# Patient Record
Sex: MALE | Race: BLACK OR AFRICAN AMERICAN | NOT HISPANIC OR LATINO | Employment: OTHER | URBAN - METROPOLITAN AREA
[De-identification: names, ages, dates, MRNs, and addresses within clinical notes are randomized per-mention and may not be internally consistent; named-entity substitution may affect disease eponyms.]

---

## 2017-01-01 ENCOUNTER — HOSPITAL ENCOUNTER (EMERGENCY)
Facility: OTHER | Age: 72
Discharge: HOME | End: 2017-01-01
Attending: EMERGENCY MEDICINE
Payer: MEDICARE

## 2017-01-01 VITALS
HEART RATE: 76 BPM | TEMPERATURE: 98 F | HEIGHT: 70 IN | RESPIRATION RATE: 18 BRPM | DIASTOLIC BLOOD PRESSURE: 89 MMHG | BODY MASS INDEX: 18.61 KG/M2 | SYSTOLIC BLOOD PRESSURE: 146 MMHG | WEIGHT: 130 LBS | OXYGEN SATURATION: 99 %

## 2017-01-01 DIAGNOSIS — K13.0 ABSCESS OF LIP: Primary | ICD-10-CM

## 2017-01-01 PROCEDURE — 99283 EMERGENCY DEPT VISIT LOW MDM: CPT

## 2017-01-01 PROCEDURE — 25000003 PHARM REV CODE 250: Performed by: EMERGENCY MEDICINE

## 2017-01-01 RX ORDER — CLINDAMYCIN HYDROCHLORIDE 150 MG/1
300 CAPSULE ORAL
Status: COMPLETED | OUTPATIENT
Start: 2017-01-01 | End: 2017-01-01

## 2017-01-01 RX ORDER — CLINDAMYCIN HYDROCHLORIDE 300 MG/1
300 CAPSULE ORAL 3 TIMES DAILY
Qty: 30 CAPSULE | Refills: 0 | Status: SHIPPED | OUTPATIENT
Start: 2017-01-01 | End: 2017-01-11

## 2017-01-01 RX ORDER — TAMSULOSIN HYDROCHLORIDE 0.4 MG/1
0.4 CAPSULE ORAL DAILY
COMMUNITY

## 2017-01-01 RX ORDER — TRAMADOL HYDROCHLORIDE 50 MG/1
50 TABLET ORAL EVERY 6 HOURS PRN
Qty: 12 TABLET | Refills: 0 | Status: SHIPPED | OUTPATIENT
Start: 2017-01-01 | End: 2017-01-11

## 2017-01-01 RX ADMIN — CLINDAMYCIN HYDROCHLORIDE 300 MG: 150 CAPSULE ORAL at 06:01

## 2017-01-01 NOTE — ED AVS SNAPSHOT
[facility]  [address]  [facility] [address]               Ayaan Ricks   2017  5:48 AM   ED    Description:  Male : 1944   Department:  [facility]           Your Care was Coordinated By:     Provider Role From To    Humphrey Alex MD Attending Provider 17 0552 --      Reason for Visit     Dental Pain           Diagnoses this Visit        Comments    Abscess of lip    -  Primary       ED Disposition     None           To Do List           Follow-up Information     Follow up with [facility] In 2 days.    Contact information:    [address]  [place] [address]         These Medications        Disp Refills Start End    clindamycin (CLEOCIN) 300 MG capsule 30 capsule 0 2017    Take 1 capsule (300 mg total) by mouth 3 (three) times daily. - Oral    tramadol (ULTRAM) 50 mg tablet 12 tablet 0 2017    Take 1 tablet (50 mg total) by mouth every 6 (six) hours as needed for Pain. - Oral      [facility]     [facility] -  Assistance  Registered nurses in the [facility] provide clinical advisement, health education, appointment booking, and other advisory services.  Call for this free service at [phone].             Medications           Message regarding Medications     Verify the changes and/or additions to your medication regime listed below are the same as discussed with your clinician today.  If any of these changes or additions are incorrect, please notify your healthcare provider.        START taking these NEW medications        Refills    clindamycin (CLEOCIN) 300 MG capsule 0    Sig: Take 1 capsule (300 mg total) by mouth 3 (three) times daily.    Class: Print    Route: Oral    tramadol (ULTRAM) 50 mg tablet 0    Sig: Take 1 tablet (50 mg total) by mouth every 6 (six) hours as needed for Pain.    Class: Print     "Route: Oral      These medications were administered today        Dose Freq    clindamycin capsule 300 mg 300 mg ED 1 Time    Sig: Take 2 capsules (300 mg total) by mouth ED 1 Time.    Class: Normal    Route: Oral           Verify that the below list of medications is an accurate representation of the medications you are currently taking.  If none reported, the list may be blank. If incorrect, please contact your healthcare provider. Carry this list with you in case of emergency.           Current Medications     tamsulosin (FLOMAX) 0.4 mg Cp24 Take 0.4 mg by mouth once daily.    clindamycin (CLEOCIN) 300 MG capsule Take 1 capsule (300 mg total) by mouth 3 (three) times daily.    clindamycin capsule 300 mg Take 2 capsules (300 mg total) by mouth ED 1 Time.    tramadol (ULTRAM) 50 mg tablet Take 1 tablet (50 mg total) by mouth every 6 (six) hours as needed for Pain.           Clinical Reference Information           Your Vitals Were     BP Pulse Temp Resp Height Weight    146/89 (BP Location: Left arm, Patient Position: Sitting) 76 98.2 °F (36.8 °C) (Oral) 18 5' 10" (1.778 m) 59 kg (130 lb)    SpO2 BMI             99% 18.65 kg/m2         Allergies as of 1/1/2017        Reactions    Shellfish Containing Products       Immunizations Administered on Date of Encounter - 1/1/2017     None      ED Micro, Lab, POCT     None      ED Imaging Orders     None      Discharge References/Attachments     ABSCESS, ANTIBIOTIC TREATMENT ONLY (ENGLISH)      [facility] Sign-Up     Activating your [facility] account is as easy as 1-2-3!     1) Visit [email or web address], select Sign Up Now, enter this activation code and your date of birth, then select Next.  [email or web address]  Expires: 2/15/2017  6:05 AM      2) Create a username and password to use when you visit [facility] in the future and select a security question in case you lose your password and select Next.    3) Enter your e-mail address and click Sign Up!    Additional Information  If " you have questions, please e-mail [email or web address] or call [phone] to talk to our [facility] staff. Remember, [facility] is NOT to be used for urgent needs. For medical emergencies, dial 911.         Smoking Cessation     If you would like to quit smoking:   You may be eligible for free services if you are a [place] resident and started smoking cigarettes before September 1, 1988.  Call the Smoking Cessation Trust (SCT) toll free at (951) 774-5090 or (991) 528-8118.   Call 4-100-QUIT-NOW if you do not meet the above criteria.             [facility] complies with applicable Federal civil rights laws and does not discriminate on the basis of race, color, national origin, age, disability, or sex.        Language Assistance Services     ATTENTION: Language assistance services are available, free of charge. Please call [phone].      ATENCIÓN: Si habla español, tiene a vincent disposición servicios gratuitos de asistencia lingüística. Llame al [phone].     CHÚ Ý: N?u b?n nói Ti?ng Vi?t, có các d?ch v? h? tr? ngôn ng? mi?n phí dành cho b?n. G?i s? [phone].

## 2017-01-01 NOTE — ED NOTES
Pt presents to the ED with c/o swelling to the top lip. Pt has hardened mass to the inner upper lip, no drainage. Pt reports it started about 6 days ago. Pt c/o 8/10 pain to the lip. Pt appears non toxic denies fever/chills.

## 2017-01-01 NOTE — ED PROVIDER NOTES
Encounter Date: 1/1/2017    SCRIBE #1 NOTE: I, Patricia Maria, am scribing for, and in the presence of,  Dr. Butt. I have scribed the entire note.       History     Chief Complaint   Patient presents with    Dental Pain     pt reports dental pain and swelling since elaine on the upper front teeth.      Review of patient's allergies indicates:   Allergen Reactions    Shellfish containing products      HPI Comments: Time seen by provider: 5:57 AM    This is a 72 y.o. male with HTN who presents with complaint of acute left lip swelling that began 6 days ago. He notes associated left sided lip pain. He reports history of abscess in same location. He states he recently got his upper left incisor pulled.     The history is provided by the patient.     Past Medical History   Diagnosis Date    Blind one eye      left    Hypertension     Prostate disorder      No past medical history pertinent negatives.  Past Surgical History   Procedure Laterality Date    Hernia repair      Colon surgery       History reviewed. No pertinent family history.  Social History   Substance Use Topics    Smoking status: Current Every Day Smoker    Smokeless tobacco: None    Alcohol use Yes      Comment: occasional     Review of Systems   Constitutional: Negative for diaphoresis and fever.   HENT: Negative for congestion and sore throat.         Left upper lip swelling and pain.   Eyes: Negative for pain.   Respiratory: Negative for cough and shortness of breath.    Cardiovascular: Negative for chest pain and leg swelling.   Gastrointestinal: Negative for nausea and vomiting.   Genitourinary: Negative for dysuria and hematuria.   Musculoskeletal: Negative for back pain and myalgias.   Skin: Negative for color change and rash.   Neurological: Negative for syncope and headaches.   Psychiatric/Behavioral: Negative for behavioral problems and confusion.       Physical Exam   Initial Vitals   BP Pulse Resp Temp SpO2   01/01/17 0547  01/01/17 0547 01/01/17 0547 01/01/17 0547 01/01/17 0547   146/89 76 18 98.2 °F (36.8 °C) 99 %     Physical Exam    Nursing note and vitals reviewed.  Constitutional: He appears well-developed and well-nourished. He is not diaphoretic. No distress.   HENT:   Head: Normocephalic and atraumatic.   Left upper incisor is missing, there is no tooth or gum tenderness. No swelling. Patient has a hard indurated abscess to the left upper lip.   No airway obstruction. No drooling. No trismus. No stridor.  Oropharynx is clear and intact.  Moist mucous membranes.   Eyes: Right eye exhibits no discharge. Left eye exhibits no discharge.   Conjunctiva are pink, clear, and intact.   Neck: Normal range of motion. Neck supple. No JVD present.   Cardiovascular: Normal rate, regular rhythm, S1 normal, S2 normal and normal heart sounds. Exam reveals no gallop and no friction rub.    No murmur heard.  Normal S1, S2. No murmurs, no rubs, no gallops.    Pulmonary/Chest: Breath sounds normal. No respiratory distress. He has no wheezes. He has no rhonchi. He has no rales.   Clear to ascultation bilaterally.   Abdominal: Soft. Bowel sounds are normal. He exhibits no distension. There is no tenderness. There is no rebound and no guarding.   No audible bruits.   Musculoskeletal: Normal range of motion. He exhibits no edema or tenderness.   No lower extremity edema.    Neurological: He is alert and oriented to person, place, and time. He has normal strength. No sensory deficit.   Skin: Skin is warm and dry. No rash and no abscess noted. No erythema. No pallor.   Psychiatric: He has a normal mood and affect. His behavior is normal. Judgment and thought content normal.         ED Course   Procedures  Labs Reviewed - No data to display   Imaging Results     None                             Scribe Attestation:   Scribe #1: I performed the above scribed service and the documentation accurately describes the services I performed. I attest to the  accuracy of the note.    Attending Attestation:           Physician Attestation for Scribe:  Physician Attestation Statement for Scribe #1: I, Dr. Alex, reviewed documentation, as scribed by Patricia Maria in my presence, and it is both accurate and complete.         Attending ED Notes:   Urgent evaluation a 72-year-old male with complaint of lesion to left upper lip.  Lesion is consistent with abscess formation.  No fluctuance to incise and drain.  Patient is afebrile, nontoxic-appearing with stable vital signs.  Patient is extensively counseled on his diagnosis and treatment, discharged in good condition and directed to follow-up with his PCP in the next 24-48 hours.          ED Course     Clinical Impression:     1. Abscess of lip              Humphrey Alex MD  01/01/17 0816

## 2017-01-01 NOTE — ED NOTES
Pt given d/c instructions to include medications and follow up care and verbalized understanding. All questions answered at the time of d/c. Pt ambulated to d/c  window in stable condition.

## 2017-01-05 ENCOUNTER — HOSPITAL ENCOUNTER (EMERGENCY)
Facility: OTHER | Age: 72
Discharge: HOME | End: 2017-01-05
Attending: EMERGENCY MEDICINE
Payer: MEDICARE

## 2017-01-05 VITALS
SYSTOLIC BLOOD PRESSURE: 169 MMHG | RESPIRATION RATE: 15 BRPM | WEIGHT: 120 LBS | OXYGEN SATURATION: 100 % | HEIGHT: 70 IN | TEMPERATURE: 97 F | DIASTOLIC BLOOD PRESSURE: 91 MMHG | BODY MASS INDEX: 17.18 KG/M2 | HEART RATE: 76 BPM

## 2017-01-05 DIAGNOSIS — K13.0 LIP ABSCESS: Primary | ICD-10-CM

## 2017-01-05 PROCEDURE — 40800 DRAINAGE OF MOUTH LESION: CPT

## 2017-01-05 PROCEDURE — 25000003 PHARM REV CODE 250: Performed by: PHYSICIAN ASSISTANT

## 2017-01-05 PROCEDURE — 99283 EMERGENCY DEPT VISIT LOW MDM: CPT | Mod: 25

## 2017-01-05 PROCEDURE — 10060 I&D ABSCESS SIMPLE/SINGLE: CPT

## 2017-01-05 RX ORDER — LIDOCAINE HYDROCHLORIDE AND EPINEPHRINE 10; 10 MG/ML; UG/ML
10 INJECTION, SOLUTION INFILTRATION; PERINEURAL
Status: COMPLETED | OUTPATIENT
Start: 2017-01-05 | End: 2017-01-05

## 2017-01-05 RX ORDER — IBUPROFEN 600 MG/1
600 TABLET ORAL EVERY 6 HOURS PRN
Qty: 20 TABLET | Refills: 0 | Status: SHIPPED | OUTPATIENT
Start: 2017-01-05 | End: 2017-02-04

## 2017-01-05 RX ORDER — LIDOCAINE AND PRILOCAINE 25; 25 MG/G; MG/G
CREAM TOPICAL
Status: DISCONTINUED | OUTPATIENT
Start: 2017-01-05 | End: 2017-01-05

## 2017-01-05 RX ADMIN — LIDOCAINE HYDROCHLORIDE,EPINEPHRINE BITARTRATE 10 ML: 10; .01 INJECTION, SOLUTION INFILTRATION; PERINEURAL at 10:01

## 2017-01-05 NOTE — ED TRIAGE NOTES
Pt presents to ED with c/o upper lip swelling. Pt states he was seen here in ED on 1/1/2017, diagnosed with abscess and started on Clindamycin. Pt reports he has been taking antibiotics, but states swelling has increased. Denies difficulty swallowing, denies difficulty breathing. Pt AAO x3.

## 2017-01-05 NOTE — ED AVS SNAPSHOT
[facility]  [address]  [facility] [address]               Ayaan Ricks   2017  9:37 AM   ED    Description:  Male : 1944   Department:  [facility]           Your Care was Coordinated By:     Provider Role From To    Concha Gupta MD Attending Provider 1746 --    Nuvia Pandey PA-C Physician Assistant 17 --      Reason for Visit     Oral Swelling           Diagnoses this Visit        Comments    Lip abscess    -  Primary       ED Disposition     None           To Do List           Follow-up Information     Follow up with [facility] In 2 days.    Specialty:  Emergency Medicine    Why:  For symptom re-check.     Contact information:    [address]  [facility] [address] [phone]       These Medications        Disp Refills Start End    ibuprofen (ADVIL,MOTRIN) 600 MG tablet 20 tablet 0 2017     Take 1 tablet (600 mg total) by mouth every 6 (six) hours as needed for Pain. - Oral      [facility]     [facility] Care [facility] -  Assistance  Registered nurses in the [facility] provide clinical advisement, health education, appointment booking, and other advisory services.  Call for this free service at [phone].             Medications           Message regarding Medications     Verify the changes and/or additions to your medication regime listed below are the same as discussed with your clinician today.  If any of these changes or additions are incorrect, please notify your healthcare provider.        START taking these NEW medications        Refills    ibuprofen (ADVIL,MOTRIN) 600 MG tablet 0    Sig: Take 1 tablet (600 mg total) by mouth every 6 (six) hours as needed for Pain.    Class: Print    Route: Oral      These medications were administered today        Dose Freq    lidocaine-EPINEPHrine 1%-1:100,000 injection 10 mL 10 mL ED  "1 Time    Sig: Inject 10 mLs into the skin ED 1 Time.    Class: Normal    Route: Subcutaneous    Cosign for Ordering: Required by Concha Gupta MD           Verify that the below list of medications is an accurate representation of the medications you are currently taking.  If none reported, the list may be blank. If incorrect, please contact your healthcare provider. Carry this list with you in case of emergency.           Current Medications     clindamycin (CLEOCIN) 300 MG capsule Take 1 capsule (300 mg total) by mouth 3 (three) times daily.    tamsulosin (FLOMAX) 0.4 mg Cp24 Take 0.4 mg by mouth once daily.    tramadol (ULTRAM) 50 mg tablet Take 1 tablet (50 mg total) by mouth every 6 (six) hours as needed for Pain.    ibuprofen (ADVIL,MOTRIN) 600 MG tablet Take 1 tablet (600 mg total) by mouth every 6 (six) hours as needed for Pain.           Clinical Reference Information           Your Vitals Were     BP Pulse Temp Resp Height Weight    169/91 (BP Location: Left arm, Patient Position: Sitting) 76 97.4 °F (36.3 °C) (Oral) 15 5' 10" (1.778 m) 54.4 kg (120 lb)    SpO2 BMI             100% 17.22 kg/m2         Allergies as of 1/5/2017        Reactions    Shellfish Containing Products     Unsure if he is allergic to Shellfish or not.      Immunizations Administered on Date of Encounter - 1/5/2017     None      ED Micro, Lab, POCT     None      ED Imaging Orders     None      Discharge References/Attachments     ABSCESS, INCISION AND DRAINAGE (ENGLISH)      [facility] Sign-Up     Activating your [facility] account is as easy as 1-2-3!     1) Visit [email or web address], select Sign Up Now, enter this activation code and your date of birth, then select Next.  [email or web address]  Expires: 2/15/2017  6:05 AM      2) Create a username and password to use when you visit [facility] in the future and select a security question in case you lose your password and select Next.    3) Enter your e-mail address and click Sign " Up!    Additional Information  If you have questions, please e-mail [email or web address] or call [phone] to talk to our [facility] staff. Remember, [facility] is NOT to be used for urgent needs. For medical emergencies, dial 911.         Smoking Cessation     If you would like to quit smoking:   You may be eligible for free services if you are a [place] resident and started smoking cigarettes before September 1, 1988.  Call the Smoking Cessation Trust (SCT) toll free at (159) 859-3628 or (224) 556-2802.   Call 3-120-QUIT-NOW if you do not meet the above criteria.             [facility] complies with applicable Federal civil rights laws and does not discriminate on the basis of race, color, national origin, age, disability, or sex.        Language Assistance Services     ATTENTION: Language assistance services are available, free of charge. Please call [phone].      ATENCIÓN: Si habla español, tiene a vincent disposición servicios gratuitos de asistencia lingüística. Llame al [phone].     CHÚ Ý: N?u b?n nói Ti?ng Vi?t, có các d?ch v? h? tr? ngôn ng? mi?n phí jsh cho b?n. G?i s? [phone].

## 2017-01-05 NOTE — ED PROVIDER NOTES
Encounter Date: 1/5/2017       History     Chief Complaint   Patient presents with    Oral Swelling     upper lip swelling gradually worsening since Marsha. reports being seen on New Years with no improvement in swelling. denies dental pain or fever/chills     Review of patient's allergies indicates:   Allergen Reactions    Shellfish containing products      Unsure if he is allergic to Shellfish or not.     HPI Comments: Patient is 72-year-old male history of hypertension and prostate disorder who presents with complaints of lip swelling that is been present for approximately 6 days prior to arrival.  He reports previously being seen in this ER 4 days ago and was diagnosed with a lip abscess.  He was treated with clindamycin but reports area of swelling and pain is only increasing in size.  He has reported subjective fevers but denies measured temperatures.  He has no report of chest pain, shortness of breath, dizziness, altered mental status, nausea, vomiting.  Currently unaccompanied in the ER.     The history is provided by the patient.     Past Medical History   Diagnosis Date    Blind one eye      left    Hypertension     Prostate disorder      No past medical history pertinent negatives.  Past Surgical History   Procedure Laterality Date    Hernia repair      Colon surgery       History reviewed. No pertinent family history.  Social History   Substance Use Topics    Smoking status: Current Every Day Smoker     Types: Cigarettes    Smokeless tobacco: None    Alcohol use Yes      Comment: occasional     Review of Systems   Constitutional: Negative for chills and fever.   HENT: Negative for sore throat and trouble swallowing.    Eyes: Negative for visual disturbance.   Respiratory: Negative for cough and shortness of breath.    Cardiovascular: Negative for chest pain.   Gastrointestinal: Negative for abdominal pain, constipation, diarrhea, nausea and vomiting.   Genitourinary: Negative for dysuria  and flank pain.   Musculoskeletal: Negative for back pain, neck pain and neck stiffness.   Skin: Negative for rash.        Lip abscess   Neurological: Negative for dizziness, syncope, weakness and headaches.   Psychiatric/Behavioral: Negative for confusion.       Physical Exam   Initial Vitals   BP Pulse Resp Temp SpO2   01/05/17 0934 01/05/17 0934 01/05/17 0934 01/05/17 0934 01/05/17 0934   169/91 76 15 97.4 °F (36.3 °C) 100 %     Physical Exam    Nursing note and vitals reviewed.  Constitutional: He appears well-developed and well-nourished. He is not diaphoretic. No distress.   Elderly -American male who appears younger than stated age in no acute distress but does appear uncomfortable during interview and exam.  He is able to speak in clear full sentences, makes good eye contact and is cooperative.  His oral secretions with ease.   HENT:   Head: Normocephalic and atraumatic.       There is no lingual elevation, uvular deviation, trismus, facial cellulitis   Eyes: EOM are normal. Pupils are equal, round, and reactive to light.   Neck: Normal range of motion. Neck supple.   Cardiovascular: Normal rate, regular rhythm and normal heart sounds. Exam reveals no gallop and no friction rub.    No murmur heard.  Pulmonary/Chest: Breath sounds normal. He has no wheezes. He has no rhonchi. He has no rales.   Abdominal: Soft. Bowel sounds are normal. There is no tenderness. There is no rebound and no guarding.   Musculoskeletal: Normal range of motion. He exhibits no edema or tenderness.   Lymphadenopathy:     He has no cervical adenopathy.   Neurological: He is alert and oriented to person, place, and time. He has normal strength.   Skin: Skin is warm and dry. No rash and no abscess noted. No erythema.   Psychiatric: He has a normal mood and affect. His behavior is normal. Thought content normal.         ED Course   I & D - Incision and Drainage  Date/Time: 1/5/2017 10:34 AM  Performed by: ORLANDO BAH  SHERYL  Authorized by: LUIZA COREAS   Consent Done: Not Needed  Type: abscess  Body area: mouth (upper lip)  Anesthesia: local infiltration    Anesthesia:  Anesthesia: local infiltration  Local Anesthetic: lidocaine 1% with epinephrine   Patient sedated: no  Scalpel size: 11  Incision type: single straight  Complexity: simple  Drainage: pus,  bloody and  purulent  Drainage amount: moderate  Wound treatment: incision,  drainage and  wound left open  Complications: No  Specimens: No  Implants: No  Patient tolerance: Patient tolerated the procedure well with no immediate complications        Labs Reviewed - No data to display          Medical Decision Making:   ED Management:  Urgent evaluation of 72-year-old male who presents with complaints of upper lip abscess without evidence of facial cellulitis.  Patient is afebrile, nontoxic appearing, hemodynamically stable.  Physical exam outlined above and reveals significant abscess with swelling to the left upper lip.  There is centralized fluctuance felt on the mucous membrane on the inside of the lip.  I did I&D this abscess with good result and significant pain relief reported by the patient.  No complications.  I will encourage patient to continue taking the previously prescribed clindamycin until completed.  I gave him ibuprofen to take for pain.  He is encouraged to apply ice to decrease swelling immediately after procedure and then once home to apply heat.  She is amenable to this plan.  He is made aware of signs and symptoms of worsening and is told if these present he should return to the ER.  Case discussed with attending who agrees with plan.                   ED Course     Clinical Impression:   The encounter diagnosis was Lip abscess.          Nuvia Pandey PA-C  01/05/17 8028

## 2017-02-04 ENCOUNTER — HOSPITAL ENCOUNTER (EMERGENCY)
Facility: OTHER | Age: 72
Discharge: HOME | End: 2017-02-04
Attending: EMERGENCY MEDICINE
Payer: MEDICARE

## 2017-02-04 VITALS
DIASTOLIC BLOOD PRESSURE: 80 MMHG | WEIGHT: 130 LBS | HEIGHT: 70 IN | HEART RATE: 70 BPM | BODY MASS INDEX: 18.61 KG/M2 | SYSTOLIC BLOOD PRESSURE: 132 MMHG | RESPIRATION RATE: 18 BRPM | OXYGEN SATURATION: 99 % | TEMPERATURE: 99 F

## 2017-02-04 DIAGNOSIS — K13.0 ABSCESS OF LIP: Primary | ICD-10-CM

## 2017-02-04 PROCEDURE — 25000003 PHARM REV CODE 250: Performed by: EMERGENCY MEDICINE

## 2017-02-04 PROCEDURE — 99283 EMERGENCY DEPT VISIT LOW MDM: CPT

## 2017-02-04 RX ORDER — TRAMADOL HYDROCHLORIDE 50 MG/1
50 TABLET ORAL EVERY 6 HOURS PRN
Qty: 12 TABLET | Refills: 0 | Status: SHIPPED | OUTPATIENT
Start: 2017-02-04 | End: 2017-02-14

## 2017-02-04 RX ORDER — CLINDAMYCIN HYDROCHLORIDE 150 MG/1
300 CAPSULE ORAL
Status: COMPLETED | OUTPATIENT
Start: 2017-02-04 | End: 2017-02-04

## 2017-02-04 RX ORDER — CLINDAMYCIN HYDROCHLORIDE 300 MG/1
300 CAPSULE ORAL 3 TIMES DAILY
Qty: 30 CAPSULE | Refills: 0 | Status: SHIPPED | OUTPATIENT
Start: 2017-02-04 | End: 2017-02-14

## 2017-02-04 RX ADMIN — CLINDAMYCIN HYDROCHLORIDE 300 MG: 150 CAPSULE ORAL at 08:02

## 2017-02-04 NOTE — ED TRIAGE NOTES
Pt reports an abscess to top upper lip for the past month; pt had same issue a few weeks ago. [place] reported upon touch.

## 2017-02-04 NOTE — ED AVS SNAPSHOT
[facility]  [address]  [facility] [address]               Ayaan Ricks   2017  6:51 AM   ED    Description:  Male : 1944   Department:  [facility]           Your Care was Coordinated By:     Provider Role From To    Humphrey Alex MD Attending Provider 17 0701 --      Reason for Visit     Abscess           Diagnoses this Visit        Comments    Abscess of lip    -  Primary       ED Disposition     None           To Do List           Follow-up Information     Follow up with Aly Rice MD In 2 days.    Specialty:  Otolaryngology    Contact information:    [address] [facility] [facility] [address]  [phone]          Follow up with [address] - Otorhinolaryngology In 2 days.    Specialty:  Otolaryngology    Contact information:    [address]  [facility] [address] [phone]    Additional information:    [facility] [place] - 4th Floor       These Medications        Disp Refills Start End    clindamycin (CLEOCIN) 300 MG capsule 30 capsule 0 2017    Take 1 capsule (300 mg total) by mouth 3 (three) times daily. - Oral    tramadol (ULTRAM) 50 mg tablet 12 tablet 0 2017    Take 1 tablet (50 mg total) by mouth every 6 (six) hours as needed for Pain. - Oral      [facility]     [facility] On Call Nurse Care Line -  Assistance  Registered nurses in the [facility] On Call Center provide clinical advisement, health education, appointment booking, and other advisory services.  Call for this free service at [phone].             Medications           Message regarding Medications     Verify the changes and/or additions to your medication regime listed below are the same as discussed with your clinician today.  If any of these changes or additions are incorrect, please notify your healthcare provider.        START taking these NEW medications        Refills     "clindamycin (CLEOCIN) 300 MG capsule 0    Sig: Take 1 capsule (300 mg total) by mouth 3 (three) times daily.    Class: Print    Route: Oral    tramadol (ULTRAM) 50 mg tablet 0    Sig: Take 1 tablet (50 mg total) by mouth every 6 (six) hours as needed for Pain.    Class: Print    Route: Oral      These medications were administered today        Dose Freq    clindamycin capsule 300 mg 300 mg ED 1 Time    Sig: Take 2 capsules (300 mg total) by mouth ED 1 Time.    Class: Normal    Route: Oral      STOP taking these medications     ibuprofen (ADVIL,MOTRIN) 600 MG tablet Take 1 tablet (600 mg total) by mouth every 6 (six) hours as needed for Pain.           Verify that the below list of medications is an accurate representation of the medications you are currently taking.  If none reported, the list may be blank. If incorrect, please contact your healthcare provider. Carry this list with you in case of emergency.           Current Medications     tamsulosin (FLOMAX) 0.4 mg Cp24 Take 0.4 mg by mouth once daily.    clindamycin (CLEOCIN) 300 MG capsule Take 1 capsule (300 mg total) by mouth 3 (three) times daily.    clindamycin capsule 300 mg Take 2 capsules (300 mg total) by mouth ED 1 Time.    tramadol (ULTRAM) 50 mg tablet Take 1 tablet (50 mg total) by mouth every 6 (six) hours as needed for Pain.           Clinical Reference Information           Your Vitals Were     BP Pulse Temp Resp Height Weight    133/82 (BP Location: Left arm, Patient Position: Sitting) 71 98.7 °F (37.1 °C) (Oral) 16 5' 10" (1.778 m) 59 kg (130 lb)    SpO2 BMI             99% 18.65 kg/m2         Allergies as of 2/4/2017        Reactions    Shellfish Containing Products     Unsure if he is allergic to Shellfish or not.      Immunizations Administered on Date of Encounter - 2/4/2017     None      ED Micro, Lab, POCT     None      ED Imaging Orders     None      Discharge References/Attachments     ABSCESS, ANTIBIOTIC TREATMENT ONLY (ENGLISH)    "   [facility] Sign-Up     Activating your [facility] account is as easy as 1-2-3!     1) Visit [email or web address], select Sign Up Now, enter this activation code and your date of birth, then select Next.  [email or web address]  Expires: 2/15/2017  6:05 AM      2) Create a username and password to use when you visit [facility] in the future and select a security question in case you lose your password and select Next.    3) Enter your e-mail address and click Sign Up!    Additional Information  If you have questions, please e-mail [email or web address] or call [phone] to talk to our [facility] staff. Remember, [facility] is NOT to be used for urgent needs. For medical emergencies, dial 911.         Smoking Cessation     If you would like to quit smoking:   You may be eligible for free services if you are a [place] resident and started smoking cigarettes before September 1, 1988.  Call the Smoking Cessation [employer] ([facility]) toll free at [phone] or [phone]) 584-2569.   Call 5-813-QUIT-NOW if you do not meet the above criteria.             [facility] complies with applicable Federal civil rights laws and does not discriminate on the basis of race, color, national origin, age, disability, or sex.        Language Assistance Services     ATTENTION: Language assistance services are available, free of charge. Please call [phone].      ATENCIÓN: Si habla español, tiene a vincent disposición servicios gratuitos de asistencia lingüística. Llame al [phone].     CHÚ Ý: N?u b?n nói Ti?ng Vi?t, có các d?ch v? h? tr? ngôn ng? mi?n phí dành cho b?n. G?i s? [phone].

## 2017-02-04 NOTE — ED PROVIDER NOTES
Encounter Date: 2/4/2017    SCRIBE #1 NOTE: I, Jaclyn Cool, am scribing for, and in the presence of, Dr. Alex.       History     Chief Complaint   Patient presents with    Abscess     abscess x 1 month, left lip. Tennderness reported upon touch.      Review of patient's allergies indicates:   Allergen Reactions    Shellfish containing products      Unsure if he is allergic to Shellfish or not.     HPI Comments:   Time seen by provider: 7:14 AM    The patient is a 72 y.o. male with HTN who presents to the ED with an acute onset of frequent abscess to left upper lip. The symptoms have been going on for years. He has been trying to find a definite treatment. The patient denies any other symptoms at this time. No pertinent SHx noted. No known drug allergies noted.    The history is provided by the patient.     Past Medical History   Diagnosis Date    Blind one eye      left    Hypertension     Prostate disorder      No past medical history pertinent negatives.  Past Surgical History   Procedure Laterality Date    Hernia repair      Colon surgery       History reviewed. No pertinent family history.  Social History   Substance Use Topics    Smoking status: Current Every Day Smoker     Types: Cigarettes    Smokeless tobacco: None    Alcohol use Yes      Comment: occasional     Review of Systems   Constitutional: Negative for chills and fever.   HENT: Negative for congestion, rhinorrhea, sneezing and sore throat.    Eyes: Negative for visual disturbance.   Respiratory: Negative for cough and shortness of breath.    Cardiovascular: Negative for chest pain and palpitations.   Gastrointestinal: Negative for abdominal pain, diarrhea, nausea and vomiting.   Genitourinary: Negative for dysuria and hematuria.   Musculoskeletal: Negative for back pain and neck pain.   Skin: Negative for rash.        Positive for abscess.   Neurological: Negative for seizures, syncope and headaches.     Physical Exam   Initial Vitals    BP Pulse Resp Temp SpO2   02/04/17 0650 02/04/17 0650 02/04/17 0650 02/04/17 0650 02/04/17 0650   133/82 71 16 98.7 °F (37.1 °C) 99 %     Physical Exam    Nursing note and vitals reviewed.  Constitutional: He appears well-developed and well-nourished. He is not diaphoretic. No distress.   HENT:   Head: Normocephalic.   Mouth/Throat: Oropharynx is clear and moist.   Hard indurated abscess the left upper lip measuring 2cm with scar tissue around the area from prior I&D's. No Shaq's angina. No drooling.    Eyes: Conjunctivae and EOM are normal. Pupils are equal, round, and reactive to light.   Neck: Normal range of motion. Neck supple. No stridor present.   Cardiovascular: Normal rate, regular rhythm, S1 normal, S2 normal and normal heart sounds. Exam reveals no gallop and no friction rub.    No murmur heard.  Pulmonary/Chest: Breath sounds normal. No respiratory distress. He has no wheezes. He has no rhonchi. He has no rales.   Abdominal: Soft. Bowel sounds are normal. There is no tenderness. There is no rebound and no guarding.   Musculoskeletal: Normal range of motion. He exhibits no edema or tenderness.   Lymphadenopathy:     He has no cervical adenopathy.   Neurological: He is alert and oriented to person, place, and time.   Skin: Skin is warm and dry. No rash noted. No pallor.   Psychiatric: He has a normal mood and affect. His behavior is normal. Judgment and thought content normal.       ED Course   Procedures  Labs Reviewed - No data to display.        Medical Decision Making:   History:   Old Medical Records: I decided to obtain old medical records.            Scribe Attestation:   Scribe #1: I performed the above scribed service and the documentation accurately describes the services I performed. I attest to the accuracy of the note.    Attending Attestation:           Physician Attestation for Scribe:  Physician Attestation Statement for Scribe #1: I, Dr. Alex, reviewed documentation, as scribed by  Jaclyn Cool in my presence, and it is both accurate and complete.         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  Patient is afebrile, nontoxic-appearing with stable vital signs.  No Shaq angina.  No respiratory distress.  No stridor.  No trismus.  Patient tolerating by mouth intake without complication.  The patient is extensively counseled on his diagnosis and treatment, discharged in good condition and directed to follow-up with ENT and/or maxillofacial surgery in the next 24-48 hours.          ED Course     Clinical Impression:     1. Abscess of lip          Disposition:   Disposition: Discharged  Condition: Stable       Humphrey Alex MD  02/04/17 0705

## 2017-03-16 ENCOUNTER — OFFICE VISIT (OUTPATIENT)
Dept: OTOLARYNGOLOGY | Facility: CLINIC | Age: 72
End: 2017-03-16
Payer: MEDICARE

## 2017-03-16 VITALS
HEART RATE: 74 BPM | DIASTOLIC BLOOD PRESSURE: 107 MMHG | BODY MASS INDEX: 16.67 KG/M2 | TEMPERATURE: 98 F | WEIGHT: 116.19 LBS | SYSTOLIC BLOOD PRESSURE: 171 MMHG

## 2017-03-16 DIAGNOSIS — K13.0 LIP ABSCESS: Primary | ICD-10-CM

## 2017-03-16 PROCEDURE — 40810 EXCISION OF MOUTH LESION: CPT | Mod: PBBFAC | Performed by: OTOLARYNGOLOGY

## 2017-03-16 PROCEDURE — 87070 CULTURE OTHR SPECIMN AEROBIC: CPT

## 2017-03-16 PROCEDURE — 40490 BIOPSY OF LIP: CPT | Mod: 51,S$PBB,, | Performed by: OTOLARYNGOLOGY

## 2017-03-16 PROCEDURE — 99999 PR PBB SHADOW E&M-EST. PATIENT-LVL III: CPT | Mod: PBBFAC,,, | Performed by: OTOLARYNGOLOGY

## 2017-03-16 PROCEDURE — 40810 EXCISION OF MOUTH LESION: CPT | Mod: S$PBB,,, | Performed by: OTOLARYNGOLOGY

## 2017-03-16 PROCEDURE — 99203 OFFICE O/P NEW LOW 30 MIN: CPT | Mod: 25,S$PBB,, | Performed by: OTOLARYNGOLOGY

## 2017-03-16 PROCEDURE — 88305 TISSUE EXAM BY PATHOLOGIST: CPT | Mod: 26,,, | Performed by: PATHOLOGY

## 2017-03-16 PROCEDURE — 40490 BIOPSY OF LIP: CPT | Mod: PBBFAC | Performed by: OTOLARYNGOLOGY

## 2017-03-16 PROCEDURE — 87205 SMEAR GRAM STAIN: CPT

## 2017-03-16 PROCEDURE — 99213 OFFICE O/P EST LOW 20 MIN: CPT | Mod: PBBFAC,25 | Performed by: OTOLARYNGOLOGY

## 2017-03-16 PROCEDURE — 87075 CULTR BACTERIA EXCEPT BLOOD: CPT

## 2017-03-16 PROCEDURE — 88305 TISSUE EXAM BY PATHOLOGIST: CPT | Performed by: PATHOLOGY

## 2017-03-16 RX ORDER — CLINDAMYCIN HYDROCHLORIDE 300 MG/1
300 CAPSULE ORAL 3 TIMES DAILY
Qty: 30 CAPSULE | Refills: 0 | Status: SHIPPED | OUTPATIENT
Start: 2017-03-16 | End: 2017-03-26

## 2017-03-16 NOTE — PROGRESS NOTES
Chief Complaint   Patient presents with    Consult     pain on left face, cyst on inner top lip         72 y.o. male presents with history of recurrent upper lip abscesses, currently with left facial pain and upper lip swelling. Missing tooth #8 at site of recurrent abscesses, has not seen dentist.      Review of Systems     Constitutional: Negative for fatigue and unexpected weight change.   HENT: per HPI.  Eyes: Negative for visual disturbance.   Respiratory: Negative for shortness of breath, hemoptysis  Cardiovascular: Negative for chest pain and palpitations.   Genitourinary: Negative for dysuria and difficulty urinating.   Musculoskeletal: Negative for decreased ROM, back pain.   Skin: Negative for rash, sunburn, itching.   Neurological: Negative for dizziness and seizures.   Hematological: Negative for adenopathy. Does not bruise/bleed easily.   Psychiatric/Behavioral: Negative for agitation. The patient is not nervous/anxious.   Endocrine: Negative for rapid weight loss/weight gain, heat/cold intolerance.     Past Medical History:   Diagnosis Date    Blind one eye     left    Hypertension     Prostate disorder        Past Surgical History:   Procedure Laterality Date    COLON SURGERY      HERNIA REPAIR         family history is not on file.    Pt  reports that he has been smoking Cigarettes.  He does not have any smokeless tobacco history on file. He reports that he drinks alcohol. He reports that he does not use illicit drugs.    Review of patient's allergies indicates:   Allergen Reactions    Shellfish containing products      Unsure if he is allergic to Shellfish or not.        Physical Exam    Vitals:    03/16/17 1453   BP: (!) 171/107   Pulse: 74   Temp: 97.8 °F (36.6 °C)     Body mass index is 16.67 kg/(m^2).    Physical Exam   Constitutional: He is oriented to person, place, and time. He appears well-developed and well-nourished. No distress.   HENT:   Head: Normocephalic and atraumatic.   Right  Ear: Hearing, tympanic membrane, external ear and ear canal normal. Tympanic membrane mobility is normal. No middle ear effusion. No decreased hearing is noted.   Left Ear: Hearing, tympanic membrane, external ear and ear canal normal. Tympanic membrane mobility is normal.  No middle ear effusion. No decreased hearing is noted.   Nose: Nose normal.   Mouth/Throat: Uvula is midline, oropharynx is clear and moist and mucous membranes are normal. Oral lesions present.       Eyes: Conjunctivae, EOM and lids are normal. Pupils are equal, round, and reactive to light. Right eye exhibits no discharge. Left eye exhibits no discharge.   Neck: Trachea normal, normal range of motion and phonation normal. Neck supple. No tracheal tenderness present. No tracheal deviation, no edema and no erythema present. No thyroid mass and no thyromegaly present.   Cardiovascular: Normal heart sounds.    Pulmonary/Chest: Breath sounds normal. No stridor.   Abdominal: Soft.   Lymphadenopathy:     He has no cervical adenopathy.   Neurological: He is alert and oriented to person, place, and time.   Skin: Skin is warm and dry. No rash noted. He is not diaphoretic. No erythema. No pallor.   Psychiatric: He has a normal mood and affect.   Nursing note and vitals reviewed.    Procedure note:  Incision and drainage of upper lip with deep biopsy.  Risks, benefits, and alternatives were discussed with the patient and he agreed to proceed. Once informed consent was obtained, 2 cc of 1% lidocaine with 1:100,000 epinephrine were injected submucosally around the area of fullness. A 15 blade scalpel was used to incise the mucosa and the wound was explored with sterile q-tip. Several CCs of purluent fluid were expressed and were sent for culture. The 15 blade was used to take some of the deeper tissue and sent for permanent pathology. Hemostasis was achieved with silver nitrate and pressure. He tolerated the procedure well.    Assessment     1. Lip abscess           Plan  In summary, Mr. Ricks is a 72 year old male with chronic upper lip swelling. Chronic infectious versus neoplastic process. Recommend salt water gargle as well as course of Clindamycin. RTC in once week for recheck or sooner if symptoms worsen.

## 2017-03-17 LAB
GRAM STN SPEC: NORMAL
GRAM STN SPEC: NORMAL

## 2017-03-18 LAB — BACTERIA SPEC AEROBE CULT: NORMAL

## 2017-03-21 LAB — BACTERIA SPEC ANAEROBE CULT: NORMAL

## 2017-03-22 ENCOUNTER — OFFICE VISIT (OUTPATIENT)
Dept: OTOLARYNGOLOGY | Facility: CLINIC | Age: 72
End: 2017-03-22
Payer: MEDICARE

## 2017-03-22 VITALS
WEIGHT: 114.63 LBS | SYSTOLIC BLOOD PRESSURE: 136 MMHG | HEIGHT: 70 IN | DIASTOLIC BLOOD PRESSURE: 87 MMHG | BODY MASS INDEX: 16.41 KG/M2

## 2017-03-22 DIAGNOSIS — K13.0 LIP ABSCESS: Primary | ICD-10-CM

## 2017-03-22 PROCEDURE — 99213 OFFICE O/P EST LOW 20 MIN: CPT | Mod: PBBFAC | Performed by: OTOLARYNGOLOGY

## 2017-03-22 PROCEDURE — 99999 PR PBB SHADOW E&M-EST. PATIENT-LVL III: CPT | Mod: PBBFAC,,, | Performed by: OTOLARYNGOLOGY

## 2017-03-22 PROCEDURE — 99024 POSTOP FOLLOW-UP VISIT: CPT | Mod: S$PBB,,, | Performed by: OTOLARYNGOLOGY

## 2017-03-26 NOTE — PROGRESS NOTES
Chief Complaint   Patient presents with    Other     Lip abscess    Follow-up       HPI   72 y.o. male presents with 1 week status post I and D of lip abscess by Dr. Lockwood.  Bx results benign.  No complaint.     Review of Systems   Constitutional: Negative for fatigue and unexpected weight change.   HENT: Per HPI.  Eyes: Negative for visual disturbance.   Respiratory: Negative for shortness of breath, hemoptysis   Cardiovascular: Negative for chest pain and palpitations.   Musculoskeletal: Negative for decreased ROM, back pain.   Skin: Negative for rash, sunburn, itching.   Neurological: Negative for dizziness and seizures.   Hematological: Negative for adenopathy. Does not bruise/bleed easily.   Endocrine: Negative for rapid weight loss/weight gain, heat/cold intolerance.     Past Medical History   There is no problem list on file for this patient.          Past Surgical History   Past Surgical History:   Procedure Laterality Date    COLON SURGERY      HERNIA REPAIR           Family History   No family history on file.        Social History   .  Social History     Social History    Marital status: Single     Spouse name: N/A    Number of children: N/A    Years of education: N/A     Occupational History    Not on file.     Social History Main Topics    Smoking status: Current Every Day Smoker     Types: Cigarettes    Smokeless tobacco: Not on file    Alcohol use Yes      Comment: occasional    Drug use: No    Sexual activity: Not on file     Other Topics Concern    Not on file     Social History Narrative         Allergies   Review of patient's allergies indicates:   Allergen Reactions    Shellfish containing products      Unsure if he is allergic to Shellfish or not.           Physical Exam     Vitals:    03/22/17 1535   BP: 136/87         Body mass index is 16.45 kg/(m^2).      General: AOx3, NAD   Right Ear: External Auditory Canal WNL,TM w/o masses/lesions/perforations.  Middle ear without  evidence of effusion.   Left Ear: External Auditory Canal WNL,TM w/o masses/lesions/perforations.  Middle ear without evidence of effusion.   Nose: No gross nasal septal deviation. Inferior Turbinates WNL bilaterally. No septal perforation. No masses/lesions.   Oral Cavity:  Healing L upper lip I and D site.  Oral Tongue mobile, no lesions noted. Hard Palate WNL. No buccal or FOM lesions.  Oropharynx:  No masses/lesions of the posterior pharyngeal wall. Tonsillar fossa without lesions. Soft palate without masses. Midline uvula.   Neck: No scars.  No cervical lymphadenopathy, thyromegaly or thyroid nodules.    Face: House Brackmann I bilaterally.     Assessment   1. Lip abscess        Plan   72 y.o. male doing well status post I and D lip abscess.  RTC PRN.

## 2018-03-19 ENCOUNTER — HOSPITAL ENCOUNTER (EMERGENCY)
Facility: OTHER | Age: 73
Discharge: HOME | End: 2018-03-19
Attending: EMERGENCY MEDICINE
Payer: MEDICARE

## 2018-03-19 VITALS
SYSTOLIC BLOOD PRESSURE: 150 MMHG | HEART RATE: 74 BPM | HEIGHT: 70 IN | RESPIRATION RATE: 16 BRPM | WEIGHT: 120 LBS | BODY MASS INDEX: 17.18 KG/M2 | TEMPERATURE: 98 F | DIASTOLIC BLOOD PRESSURE: 80 MMHG | OXYGEN SATURATION: 94 %

## 2018-03-19 DIAGNOSIS — M79.606 LEG PAIN: ICD-10-CM

## 2018-03-19 DIAGNOSIS — M25.569 KNEE PAIN, CHRONIC: ICD-10-CM

## 2018-03-19 DIAGNOSIS — I73.9 PAD (PERIPHERAL ARTERY DISEASE): Primary | ICD-10-CM

## 2018-03-19 DIAGNOSIS — G89.29 KNEE PAIN, CHRONIC: ICD-10-CM

## 2018-03-19 PROCEDURE — 25000003 PHARM REV CODE 250: Performed by: EMERGENCY MEDICINE

## 2018-03-19 PROCEDURE — 99284 EMERGENCY DEPT VISIT MOD MDM: CPT

## 2018-03-19 RX ORDER — ROSUVASTATIN CALCIUM 20 MG/1
20 TABLET, COATED ORAL DAILY
COMMUNITY

## 2018-03-19 RX ORDER — AMLODIPINE BESYLATE 5 MG/1
5 TABLET ORAL DAILY
Status: ON HOLD | COMMUNITY
End: 2018-04-23

## 2018-03-19 RX ORDER — VALSARTAN AND HYDROCHLOROTHIAZIDE 160; 25 MG/1; MG/1
1 TABLET ORAL DAILY
Status: ON HOLD | COMMUNITY
End: 2018-04-23

## 2018-03-19 RX ORDER — ASPIRIN 325 MG
325 TABLET ORAL
Status: COMPLETED | OUTPATIENT
Start: 2018-03-19 | End: 2018-03-19

## 2018-03-19 RX ADMIN — ASPIRIN 325 MG ORAL TABLET 325 MG: 325 PILL ORAL at 11:03

## 2018-03-19 NOTE — ED PROVIDER NOTES
Encounter Date: 3/19/2018    SCRIBE #1 NOTE: I, Mica Jackson, am scribing for, and in the presence of, Dr. Myles.       History     Chief Complaint   Patient presents with    Leg Pain     pt with c/o      Time seen by provider: 7:55 AM    This is a 73 y.o. male who presents with complaint of left leg pain for 11 months. The intermittent pain worsens when ambulating and is located behind the knee and calf. He was told he has artery blockage at [facility], but was not told where to follow up. He has no other complaints, and denies SOB, chest pain, leg swelling, difficulty ambulating, numbness, or weakness.      The history is provided by the patient.     Review of patient's allergies indicates:   Allergen Reactions    Shellfish containing products      Unsure if he is allergic to Shellfish or not.     Past Medical History:   Diagnosis Date    Blind one eye     left    Hypertension     Prostate disorder      Past Surgical History:   Procedure Laterality Date    COLON SURGERY      HERNIA REPAIR       History reviewed. No pertinent family history.  Social History   Substance Use Topics    Smoking status: Former Smoker     Types: Cigarettes     Quit date: 10/19/2017    Smokeless tobacco: Not on file    Alcohol use Yes      Comment: occasional     Review of Systems   Constitutional: Negative for fever.   HENT: Negative for sore throat.    Respiratory: Negative for shortness of breath.    Cardiovascular: Negative for chest pain and leg swelling.   Gastrointestinal: Negative for nausea.   Genitourinary: Negative for dysuria.   Musculoskeletal: Negative for back pain, gait problem, joint swelling and myalgias.        Positive for leg pain.   Skin: Negative for rash.   Neurological: Negative for weakness and numbness.   Hematological: Does not bruise/bleed easily.       Physical Exam     Initial Vitals [03/19/18 0733]   BP Pulse Resp Temp SpO2   126/77 84 18 97.4 °F (36.3 °C) 99 %      MAP       93.33          Physical Exam    Nursing note and vitals reviewed.  Constitutional: He appears well-developed and well-nourished. He is not diaphoretic. No distress.   HENT:   Head: Normocephalic and atraumatic.   Eyes: Conjunctivae and EOM are normal. No scleral icterus.   Neck: Normal range of motion. Neck supple.   Cardiovascular: Normal rate, regular rhythm and normal heart sounds. Exam reveals no gallop and no friction rub.    No murmur heard.  Pulses:       Dorsalis pedis pulses are 1+ on the left side.   Pulmonary/Chest: Breath sounds normal. No respiratory distress. He has no wheezes. He has no rhonchi. He has no rales.   Abdominal: Soft. Bowel sounds are normal. He exhibits no distension. There is no tenderness. There is no rebound and no guarding.   Musculoskeletal: Normal range of motion.   Left leg has no focal tenderness. Limb appears perfused. No erythema or sign of DVT. No sign of infection.   Neurological: He is alert and oriented to person, place, and time.   Skin: Skin is warm and dry. No rash and no abscess noted. No erythema. No pallor.   Psychiatric: He has a normal mood and affect. His behavior is normal. Judgment and thought content normal.         ED Course   Procedures  Labs Reviewed - No data to display   Imaging Results          US Lower Extremity Arteries Bilateral (Final result)  Result time 03/19/18 10:02:20    Final result by Angel Palomares MD (03/19/18 10:02:20)                 Impression:        1. Occlusion of the left peroneal trunk  2. Abnormal waveforms nearly throughout the left system  3. Diminished velocities throughout an otherwise patent right lower extremity system  4. Bilateral significant atherosclerosis.       Electronically signed by: ANGEL PALOMARES MD  Date:     03/19/18  Time:    10:02              Narrative:    Time of Procedure: 03/19/18 08:13:03  Accession # [id number]    Technique:  Bilateral lower extremity arterial duplex ultrasound examination performed. Multiple gray  scale and color doppler images were obtained in addition to waveform analysis.      Comparison: None.    Findings:    The peak systolic velocities on the right are as follows, in centimeters/second:  Common femoral artery: 36.7  Superficial femoral artery, proximal: 41  Superficial femoral artery, mid portion: 96.3  Superficial femoral artery, distal: 50.3  Popliteal artery: 27.3  Posterior tibial artery: 37.3  Anterior tibial artery: 41.6  Peroneal artery: 41.6  Dorsalis pedis artery: 21.1    The peak systolic velocities on the left are as follows, in centimeters/second:  Common femoral artery: 36.7  Superficial femoral artery, proximal: 59.6  Superficial femoral artery, mid portion: 91.9  Superficial femoral artery, distal: 86.4  Popliteal artery: 30.7  Posterior tibial artery: 25.2  Anterior tibial artery: 32.9  Peroneal artery: 0.0  Dorsalis pedis artery: 13.7    There are monophasic waveforms throughout the left lower Fritz he arterial system. Triphasic waveforms are identified throughout the right lower extremity system. Diffuse atherosclerosis is identified bilaterally.                             US Lower Extremity Veins Left (Final result)  Result time 03/19/18 09:49:02    Final result by Roselia Bishop MD (03/19/18 09:49:02)                 Impression:      No evidence of acute venous thrombosis of the left lower extremity.      Electronically signed by: ROSELIA BISHOP  Date:     03/19/18  Time:    09:49              Narrative:    HISTORY:  Leg pain    TECHNIQUE: Duplex and color flow Doppler evaluation of the left lower extremity.    COMPARISON: Tibia and fibula radiographs and knee radiographs of same date.    FINDINGS:    Right lower extremity has no evidence of acute venous thrombosis in the common femoral vein.     Left lower extremity has  no evidence of acute venous thrombosis in the common femoral, superficial femoral, greater saphenous, popliteal, peroneal, anterior tibial, and posterior tibial  vein(s).                             X-Ray Knee 3 View Left (Final result)  Result time 03/19/18 08:33:44    Final result by Adam South Jr., MD (03/19/18 08:33:44)                 Impression:     No acute abnormality.  Multiple metallic BB is noted.  Electronically signed by: ADAM SOUTH MD  Date:     03/19/18  Time:    08:33              Narrative:    Left knee 3 views.  Multiple metallic BBs overlie the lower thigh and knee.  Bones are slightly demineralized.  No fracture.                             X-Ray Tibia Fibula 2 View Left (Final result)  Result time 03/19/18 08:30:33    Final result by Adam South Jr., MD (03/19/18 08:30:33)                 Impression:     No acute abnormality.  Multiple metallic BB is noted.  Electronically signed by: ADAM SOUTH MD  Date:     03/19/18  Time:    08:30              Narrative:    Left leg 2 views.  Shrapnel noted.  Tibia and fibula are intact.                                 X-Rays:   Independently Interpreted Readings:   Other Readings:  Left Knee: Multiple metallic foreign bodies. No acute fracture.  Left Tibia Fibula: No acute fracture.    Medical Decision Making:   Initial Assessment:   Will obtain ultrasound to rule out DVT. Will check ultrasound and X-Rays. I doubt he has acute ischemic limb. His leg is warm and he is in no pain now.  Clinical Tests:   Radiological Study: Ordered and Reviewed  ED Management:  Venous ultrasound shows no acute DVT. Arteriole ultrasound shows occlusion of left peroneal trunk and abnormal wave forms throughout the left system.    11:09 AM - [facility] get to [facility].    11:16 AM - Discussed case with Dr. Cobb. He is available to see the patient in clinic tomorrow.            Scribe Attestation:   Scribe #1: I performed the above scribed service and the documentation accurately describes the services I performed. I attest to the accuracy of the note.    Attending Attestation:            Physician Attestation for Scribe:  Physician Attestation Statement for Scribe #1: I, Dr. Myles, reviewed documentation, as scribed by Mica Jackson in my presence, and it is both accurate and complete.                    Clinical Impression:     1. PAD (peripheral artery disease)    2. Knee pain, chronic    3. Leg pain        Disposition:   Disposition: Discharged  Condition: Stable                        Virgil Myles MD  03/19/18 2670

## 2018-03-19 NOTE — ED NOTES
Pt to er with c/o left knee pain . Pt states seen two weeks ago with poor circulation and possible clots the patient states still with pain .pt With positive pedal pulses and no peripheral edema. Pt aaox3 skin warm and dry.  Pt  On unknown  High blood pressure meds and blood thinner.

## 2018-03-19 NOTE — ED NOTES
Patient reclined in recliner, eyes closed, responds to verbal and tactile stimuli.  No apparent SOB or distress noted.  AAOX4.  Continuous monitoring in place. Call bell within reach.  Comfort measures addressed.  Will continue to monitor.

## 2018-03-20 ENCOUNTER — INITIAL CONSULT (OUTPATIENT)
Dept: VASCULAR SURGERY | Facility: CLINIC | Age: 73
End: 2018-03-20
Payer: MEDICARE

## 2018-03-20 ENCOUNTER — HOSPITAL ENCOUNTER (OUTPATIENT)
Dept: VASCULAR SURGERY | Facility: CLINIC | Age: 73
Discharge: HOME | End: 2018-03-20
Attending: SURGERY
Payer: MEDICARE

## 2018-03-20 VITALS
SYSTOLIC BLOOD PRESSURE: 170 MMHG | DIASTOLIC BLOOD PRESSURE: 83 MMHG | TEMPERATURE: 98 F | HEIGHT: 70 IN | BODY MASS INDEX: 17.04 KG/M2 | HEART RATE: 66 BPM | WEIGHT: 119.06 LBS

## 2018-03-20 DIAGNOSIS — I10 ESSENTIAL HYPERTENSION: ICD-10-CM

## 2018-03-20 DIAGNOSIS — Z01.818 PRE-OP EVALUATION: Primary | ICD-10-CM

## 2018-03-20 DIAGNOSIS — I73.9 PVD (PERIPHERAL VASCULAR DISEASE): Primary | ICD-10-CM

## 2018-03-20 DIAGNOSIS — I70.229 ATHEROSCLEROTIC PERIPHERAL VASCULAR DISEASE WITH REST PAIN: Primary | ICD-10-CM

## 2018-03-20 DIAGNOSIS — I73.9 PVD (PERIPHERAL VASCULAR DISEASE): ICD-10-CM

## 2018-03-20 DIAGNOSIS — I70.229 ATHEROSCLEROTIC PERIPHERAL VASCULAR DISEASE WITH REST PAIN: ICD-10-CM

## 2018-03-20 PROCEDURE — 3079F DIAST BP 80-89 MM HG: CPT | Mod: CPTII,S$GLB,, | Performed by: SURGERY

## 2018-03-20 PROCEDURE — 93923 UPR/LXTR ART STDY 3+ LVLS: CPT | Mod: S$GLB,,, | Performed by: SURGERY

## 2018-03-20 PROCEDURE — 3077F SYST BP >= 140 MM HG: CPT | Mod: CPTII,S$GLB,, | Performed by: SURGERY

## 2018-03-20 PROCEDURE — 99205 OFFICE O/P NEW HI 60 MIN: CPT | Mod: S$GLB,,, | Performed by: SURGERY

## 2018-03-20 PROCEDURE — 99999 PR PBB SHADOW E&M-EST. PATIENT-LVL III: CPT | Mod: PBBFAC,,, | Performed by: SURGERY

## 2018-03-20 NOTE — PROGRESS NOTES
HISTORY OF PRESENT ILLNESS:  A 73-year-old male with a history of short   distance, less than half a block left calf claudication and classic ischemic   rest pain of the left foot, which has all been present for approximately 10   months.  He has had no recent exacerbation.    He has no history of arterial intervention.    He is an ex-smoker and on Plavix and a statin.    PAST MEDICAL HISTORY:  1.  Blindness in the left eye.  2.  Hypertension.    PAST SURGICAL HISTORY:  1.  Hernia repair.  2.  Colon surgery.    FAMILY HISTORY:  Nil.    SOCIAL HISTORY:  Former smoker, quitting in October 2017.    MEDICATIONS:  Include statin and recently started on Plavix.    ALLERGIES:  Shellfish.    REVIEW OF SYSTEMS:  CARDIOVASCULAR:  Denies stroke or pain or DVT.  All other systems including eyes, ENT, , musculoskeletal, breast, psychiatric,   lymph, allergy and immune are negative.    PHYSICAL EXAMINATION:  VITAL SIGNS:  See nursing notes.  GENERAL:  No acute distress.  RESPIRATORY:  Normal effort.  Clear to auscultation.  CARDIOVASCULAR:  Regular rate and rhythm, nondisplaced PMI, no murmur.  VASCULAR:  2+ radial and femoral pulses, which are bounding 2+ bilaterally.    Right posterior tibial pulse is 1-2+ palpable.  Left pedal pulses are not   palpable.  EXTREMITIES:  No edema, varicosities, ulcerations, gangrene or digital   petechiae.  ABDOMEN:  No masses or tenderness.  No hepatosplenomegaly.  Aorta cannot be   palpated.  EYE:  Normal conjunctivae and lids.  ENT:  Fair dentition.  NECK:  No JVD or thyromegaly.  MUSCULOSKELETAL:  No kyphosis or scoliosis.  Extremities without clubbing or   cyanosis.  SKIN:  Warm and dry.  NEUROLOGIC:  Alert and oriented x3.  Normal mood and affect.  Midline tongue.    No speech difficulty or hoarseness, 5/5 motor strength in all extremities.    IMAGING:  ABIs are 0.94 on the right and 0.60 on the left.  However, PVRs are   completely flat at the ankle and very, very diminished at the calf,  all   consistent with severe peripheral arterial occlusive disease.    Arterial duplex done at [facility] recently failed to show any stenosis   from the left common femoral distally (except for peroneal artery occlusion),   but noted monophasic waveforms throughout.    ASSESSMENT:  Severe left peripheral arterial occlusive disease with ischemic   rest pain.    His physical exam would not suggest a primary inflow stenosis or occlusion,   which was what had frankly been suggested by the outside arterial duplex.  Given   his bounding femoral pulse on the left side, this would appear that there   indeed is severe, mostly infrainguinal occlusive disease.  As such, I will   proceed directly with an arteriogram rather than a CTA.    PLAN:  1.  Right common femoral artery approach aortogram, bilateral iliac angiogram,   selective left lower extremity angiogram and potential intervention, 03/29/2018.  2.  Hold recently started Plavix for 5-7 days.  3.  Dye prep given his shellfish allergy.    The patient understands the risks and rationale of the procedure and wishes to   proceed.      NAFISA  dd: 03/20/2018 10:52:10 (CDT)  td: 03/21/2018 09:09:08 (CDT)  Doc ID   #[id number]  Job ID #[id number]    CC:

## 2018-03-20 NOTE — LETTER
March 20, 2018      Virgil Myles MD  [address]  [place] [address]           [facility] - Vascular Surgery  [address]  [place] [address]  Phone: [phone]  Fax: [phone]          Patient: Ayaan Ricks   MR Number: [id number]   YOB: 1944   Date of Visit: 3/20/2018       Dear Dr. Virgil Myles:    Thank you for referring Ayaan Ricks to me for evaluation. Attached you will find relevant portions of my assessment and plan of care.    If you have questions, please do not hesitate to call me. I look forward to following Ayaan Ricks along with you.    Sincerely,    BARTOLOME Mahmood III, MD    Enclosure  CC:  No Recipients    If you would like to receive this communication electronically, please contact [email or web address] or [phone] to request more information on [employer] Link access.    For providers and/or their staff who would like to refer a patient to [facility], please contact us through our one-stop-shop provider referral line, [facility], at [phone].    If you feel you have received this communication in error or would no longer like to receive these types of communications, please e-mail [email or web address]

## 2018-03-23 ENCOUNTER — TELEPHONE (OUTPATIENT)
Dept: VASCULAR SURGERY | Facility: CLINIC | Age: 73
End: 2018-03-23

## 2018-03-23 NOTE — TELEPHONE ENCOUNTER
Notified Mr Ricks to  his Dye prep given his shellfish allergy from [facility] for his surgery on 3/29/2018.

## 2018-03-28 ENCOUNTER — TELEPHONE (OUTPATIENT)
Dept: VASCULAR SURGERY | Facility: CLINIC | Age: 73
End: 2018-03-28

## 2018-03-28 RX ORDER — CLOPIDOGREL BISULFATE 75 MG/1
75 TABLET ORAL DAILY
COMMUNITY

## 2018-03-28 NOTE — TELEPHONE ENCOUNTER
Spoke with Mr Ricks dye prep instructions for shellfish allergy given. Patient verbalized understanding.

## 2018-03-28 NOTE — PRE-PROCEDURE INSTRUCTIONS
PreOp Instructions given:     - Verbal medication information (what to hold and what to take) - Guera, with Dr. Cobb's office called and notified that pt did have questions regarding Dye Prep that was ordered.  - NPO guidelines   - Arrival place directions given; time to be given the day before procedure by the   Surgeon's Office   - Bathing with antibacterial soap   - Don't wear any jewelry or bring any valuables AM of surgery   - No makeup or moisturizer to face   - No perfume/cologne, powder, lotions or aftershave     Pt.expressed some concern regarding another medication ordered per ' office. Guera, with Dr. Cobb's office called and notified that pt did have questions regarding Dye Prep that was ordered.      Denies any history of side effects or issues with anesthesia or sedation.

## 2018-03-29 ENCOUNTER — ANESTHESIA EVENT (OUTPATIENT)
Dept: SURGERY | Facility: HOSPITAL | Age: 73
End: 2018-03-29
Payer: MEDICARE

## 2018-03-29 ENCOUNTER — ANESTHESIA (OUTPATIENT)
Dept: SURGERY | Facility: HOSPITAL | Age: 73
End: 2018-03-29
Payer: MEDICARE

## 2018-03-29 ENCOUNTER — SURGERY (OUTPATIENT)
Age: 73
End: 2018-03-29

## 2018-03-29 ENCOUNTER — HOSPITAL ENCOUNTER (OUTPATIENT)
Facility: HOSPITAL | Age: 73
Discharge: HOME | End: 2018-03-29
Attending: SURGERY | Admitting: SURGERY
Payer: MEDICARE

## 2018-03-29 VITALS
HEIGHT: 70 IN | TEMPERATURE: 98 F | OXYGEN SATURATION: 100 % | SYSTOLIC BLOOD PRESSURE: 123 MMHG | WEIGHT: 120 LBS | HEART RATE: 89 BPM | RESPIRATION RATE: 18 BRPM | BODY MASS INDEX: 17.18 KG/M2 | DIASTOLIC BLOOD PRESSURE: 65 MMHG

## 2018-03-29 DIAGNOSIS — I73.9 PERIPHERAL ARTERIAL DISEASE: ICD-10-CM

## 2018-03-29 DIAGNOSIS — Z01.818 ENCOUNTER FOR OTHER PREPROCEDURAL EXAMINATION: ICD-10-CM

## 2018-03-29 LAB — POCT GLUCOSE: 123 MG/DL (ref 70–110)

## 2018-03-29 PROCEDURE — D9220A PRA ANESTHESIA: Mod: ANES,,, | Performed by: ANESTHESIOLOGY

## 2018-03-29 PROCEDURE — C1894 INTRO/SHEATH, NON-LASER: HCPCS | Performed by: SURGERY

## 2018-03-29 PROCEDURE — 37000009 HC ANESTHESIA EA ADD 15 MINS: Performed by: SURGERY

## 2018-03-29 PROCEDURE — 37000008 HC ANESTHESIA 1ST 15 MINUTES: Performed by: SURGERY

## 2018-03-29 PROCEDURE — 63600175 PHARM REV CODE 636 W HCPCS: Performed by: NURSE ANESTHETIST, CERTIFIED REGISTERED

## 2018-03-29 PROCEDURE — 63600175 PHARM REV CODE 636 W HCPCS: Performed by: ANESTHESIOLOGY

## 2018-03-29 PROCEDURE — 27201423 OPTIME MED/SURG SUP & DEVICES STERILE SUPPLY: Performed by: SURGERY

## 2018-03-29 PROCEDURE — 25000003 PHARM REV CODE 250: Performed by: NURSE ANESTHETIST, CERTIFIED REGISTERED

## 2018-03-29 PROCEDURE — C1769 GUIDE WIRE: HCPCS | Performed by: SURGERY

## 2018-03-29 PROCEDURE — 25500020 PHARM REV CODE 255: Performed by: SURGERY

## 2018-03-29 PROCEDURE — 71000015 HC POSTOP RECOV 1ST HR: Performed by: SURGERY

## 2018-03-29 PROCEDURE — 36245 INS CATH ABD/L-EXT ART 1ST: CPT | Mod: ,,, | Performed by: SURGERY

## 2018-03-29 PROCEDURE — 71000016 HC POSTOP RECOV ADDL HR: Performed by: SURGERY

## 2018-03-29 PROCEDURE — 36000706: Performed by: SURGERY

## 2018-03-29 PROCEDURE — C1760 CLOSURE DEV, VASC: HCPCS | Performed by: SURGERY

## 2018-03-29 PROCEDURE — 25000003 PHARM REV CODE 250: Performed by: SURGERY

## 2018-03-29 PROCEDURE — 63600175 PHARM REV CODE 636 W HCPCS: Performed by: SURGERY

## 2018-03-29 PROCEDURE — 71000044 HC DOSC ROUTINE RECOVERY FIRST HOUR: Performed by: SURGERY

## 2018-03-29 PROCEDURE — 82962 GLUCOSE BLOOD TEST: CPT | Performed by: SURGERY

## 2018-03-29 PROCEDURE — 75710 ARTERY X-RAYS ARM/LEG: CPT | Mod: 26,,, | Performed by: SURGERY

## 2018-03-29 PROCEDURE — 63600175 PHARM REV CODE 636 W HCPCS

## 2018-03-29 PROCEDURE — 36000707: Performed by: SURGERY

## 2018-03-29 PROCEDURE — D9220A PRA ANESTHESIA: Mod: CRNA,,, | Performed by: NURSE ANESTHETIST, CERTIFIED REGISTERED

## 2018-03-29 RX ORDER — FENTANYL CITRATE 50 UG/ML
INJECTION, SOLUTION INTRAMUSCULAR; INTRAVENOUS
Status: DISCONTINUED | OUTPATIENT
Start: 2018-03-29 | End: 2018-03-29

## 2018-03-29 RX ORDER — LIDOCAINE HYDROCHLORIDE 10 MG/ML
1 INJECTION, SOLUTION EPIDURAL; INFILTRATION; INTRACAUDAL; PERINEURAL ONCE
Status: COMPLETED | OUTPATIENT
Start: 2018-03-29 | End: 2018-03-29

## 2018-03-29 RX ORDER — SODIUM CHLORIDE 9 MG/ML
INJECTION, SOLUTION INTRAVENOUS CONTINUOUS
Status: DISCONTINUED | OUTPATIENT
Start: 2018-03-29 | End: 2018-03-29 | Stop reason: HOSPADM

## 2018-03-29 RX ORDER — MUPIROCIN 20 MG/G
OINTMENT TOPICAL
Status: DISCONTINUED | OUTPATIENT
Start: 2018-03-29 | End: 2018-03-29 | Stop reason: HOSPADM

## 2018-03-29 RX ORDER — HEPARIN SODIUM 1000 [USP'U]/ML
INJECTION, SOLUTION INTRAVENOUS; SUBCUTANEOUS
Status: DISCONTINUED | OUTPATIENT
Start: 2018-03-29 | End: 2018-03-29 | Stop reason: HOSPADM

## 2018-03-29 RX ORDER — FERROUS SULFATE 324(65)MG
325 TABLET, DELAYED RELEASE (ENTERIC COATED) ORAL DAILY
COMMUNITY
End: 2019-01-29

## 2018-03-29 RX ORDER — MEPERIDINE HYDROCHLORIDE 50 MG/ML
INJECTION INTRAMUSCULAR; INTRAVENOUS; SUBCUTANEOUS
Status: DISCONTINUED
Start: 2018-03-29 | End: 2018-03-29 | Stop reason: HOSPADM

## 2018-03-29 RX ORDER — HYDRALAZINE HYDROCHLORIDE 20 MG/ML
10 INJECTION INTRAMUSCULAR; INTRAVENOUS ONCE
Status: COMPLETED | OUTPATIENT
Start: 2018-03-29 | End: 2018-03-29

## 2018-03-29 RX ORDER — SODIUM CHLORIDE 0.9 % (FLUSH) 0.9 %
3 SYRINGE (ML) INJECTION
Status: DISCONTINUED | OUTPATIENT
Start: 2018-03-29 | End: 2018-03-29 | Stop reason: HOSPADM

## 2018-03-29 RX ORDER — IODIXANOL 320 MG/ML
INJECTION, SOLUTION INTRAVASCULAR
Status: DISCONTINUED | OUTPATIENT
Start: 2018-03-29 | End: 2018-03-29 | Stop reason: HOSPADM

## 2018-03-29 RX ORDER — HYDROCODONE BITARTRATE AND ACETAMINOPHEN 5; 325 MG/1; MG/1
1 TABLET ORAL EVERY 4 HOURS PRN
Status: DISCONTINUED | OUTPATIENT
Start: 2018-03-29 | End: 2018-03-29 | Stop reason: HOSPADM

## 2018-03-29 RX ORDER — HYDROCODONE BITARTRATE AND ACETAMINOPHEN 5; 325 MG/1; MG/1
1 TABLET ORAL EVERY 6 HOURS PRN
Qty: 40 TABLET | Refills: 0 | Status: ON HOLD | OUTPATIENT
Start: 2018-03-29 | End: 2018-04-23 | Stop reason: HOSPADM

## 2018-03-29 RX ORDER — HYDRALAZINE HYDROCHLORIDE 20 MG/ML
INJECTION INTRAMUSCULAR; INTRAVENOUS
Status: COMPLETED
Start: 2018-03-29 | End: 2018-03-29

## 2018-03-29 RX ORDER — MEPERIDINE HYDROCHLORIDE 50 MG/ML
12.5 INJECTION INTRAMUSCULAR; INTRAVENOUS; SUBCUTANEOUS ONCE
Status: COMPLETED | OUTPATIENT
Start: 2018-03-29 | End: 2018-03-29

## 2018-03-29 RX ORDER — LABETALOL HYDROCHLORIDE 5 MG/ML
INJECTION, SOLUTION INTRAVENOUS
Status: DISCONTINUED | OUTPATIENT
Start: 2018-03-29 | End: 2018-03-29

## 2018-03-29 RX ORDER — LIDOCAINE HYDROCHLORIDE 10 MG/ML
INJECTION, SOLUTION EPIDURAL; INFILTRATION; INTRACAUDAL; PERINEURAL
Status: DISCONTINUED | OUTPATIENT
Start: 2018-03-29 | End: 2018-03-29 | Stop reason: HOSPADM

## 2018-03-29 RX ORDER — CEFAZOLIN SODIUM 1 G/3ML
INJECTION, POWDER, FOR SOLUTION INTRAMUSCULAR; INTRAVENOUS
Status: DISCONTINUED | OUTPATIENT
Start: 2018-03-29 | End: 2018-03-29

## 2018-03-29 RX ORDER — MIDAZOLAM HYDROCHLORIDE 1 MG/ML
INJECTION, SOLUTION INTRAMUSCULAR; INTRAVENOUS
Status: DISCONTINUED | OUTPATIENT
Start: 2018-03-29 | End: 2018-03-29

## 2018-03-29 RX ADMIN — HEPARIN SODIUM 10000 UNITS: 1000 INJECTION, SOLUTION INTRAVENOUS; SUBCUTANEOUS at 09:03

## 2018-03-29 RX ADMIN — FENTANYL CITRATE 25 MCG: 50 INJECTION, SOLUTION INTRAMUSCULAR; INTRAVENOUS at 09:03

## 2018-03-29 RX ADMIN — CEFAZOLIN 2 G: 330 INJECTION, POWDER, FOR SOLUTION INTRAMUSCULAR; INTRAVENOUS at 09:03

## 2018-03-29 RX ADMIN — HYDROCODONE BITARTRATE AND ACETAMINOPHEN 1 TABLET: 5; 325 TABLET ORAL at 12:03

## 2018-03-29 RX ADMIN — MEPERIDINE HYDROCHLORIDE 12.5 MG: 50 INJECTION INTRAMUSCULAR; INTRAVENOUS; SUBCUTANEOUS at 01:03

## 2018-03-29 RX ADMIN — MUPIROCIN: 20 OINTMENT TOPICAL at 08:03

## 2018-03-29 RX ADMIN — MIDAZOLAM HYDROCHLORIDE 1 MG: 1 INJECTION, SOLUTION INTRAMUSCULAR; INTRAVENOUS at 09:03

## 2018-03-29 RX ADMIN — MIDAZOLAM HYDROCHLORIDE 1 MG: 1 INJECTION, SOLUTION INTRAMUSCULAR; INTRAVENOUS at 08:03

## 2018-03-29 RX ADMIN — HYDRALAZINE HYDROCHLORIDE 10 MG: 20 INJECTION INTRAMUSCULAR; INTRAVENOUS at 11:03

## 2018-03-29 RX ADMIN — SODIUM CHLORIDE: 0.9 INJECTION, SOLUTION INTRAVENOUS at 08:03

## 2018-03-29 RX ADMIN — HYDRALAZINE HYDROCHLORIDE 10 MG: 20 INJECTION INTRAMUSCULAR; INTRAVENOUS at 01:03

## 2018-03-29 RX ADMIN — LABETALOL HYDROCHLORIDE 10 MG: 5 INJECTION, SOLUTION INTRAVENOUS at 09:03

## 2018-03-29 RX ADMIN — IODIXANOL 49 ML: 320 INJECTION, SOLUTION INTRAVASCULAR at 09:03

## 2018-03-29 RX ADMIN — LIDOCAINE HYDROCHLORIDE 10 ML: 10 INJECTION, SOLUTION EPIDURAL; INFILTRATION; INTRACAUDAL; PERINEURAL at 09:03

## 2018-03-29 RX ADMIN — LIDOCAINE HYDROCHLORIDE 10 MG: 10 INJECTION, SOLUTION EPIDURAL; INFILTRATION; INTRACAUDAL; PERINEURAL at 08:03

## 2018-03-29 NOTE — H&P (VIEW-ONLY)
HISTORY OF PRESENT ILLNESS:  A 73-year-old male with a history of short   distance, less than half a block left calf claudication and classic ischemic   rest pain of the left foot, which has all been present for approximately 10   months.  He has had no recent exacerbation.    He has no history of arterial intervention.    He is an ex-smoker and on Plavix and a statin.    PAST MEDICAL HISTORY:  1.  Blindness in the left eye.  2.  Hypertension.    PAST SURGICAL HISTORY:  1.  Hernia repair.  2.  Colon surgery.    FAMILY HISTORY:  Nil.    SOCIAL HISTORY:  Former smoker, quitting in October 2017.    MEDICATIONS:  Include statin and recently started on Plavix.    ALLERGIES:  Shellfish.    REVIEW OF SYSTEMS:  CARDIOVASCULAR:  Denies stroke or pain or DVT.  All other systems including eyes, ENT, , musculoskeletal, breast, psychiatric,   lymph, allergy and immune are negative.    PHYSICAL EXAMINATION:  VITAL SIGNS:  See nursing notes.  GENERAL:  No acute distress.  RESPIRATORY:  Normal effort.  Clear to auscultation.  CARDIOVASCULAR:  Regular rate and rhythm, nondisplaced PMI, no murmur.  VASCULAR:  2+ radial and femoral pulses, which are bounding 2+ bilaterally.    Right posterior tibial pulse is 1-2+ palpable.  Left pedal pulses are not   palpable.  EXTREMITIES:  No edema, varicosities, ulcerations, gangrene or digital   petechiae.  ABDOMEN:  No masses or tenderness.  No hepatosplenomegaly.  Aorta cannot be   palpated.  EYE:  Normal conjunctivae and lids.  ENT:  Fair dentition.  NECK:  No JVD or thyromegaly.  MUSCULOSKELETAL:  No kyphosis or scoliosis.  Extremities without clubbing or   cyanosis.  SKIN:  Warm and dry.  NEUROLOGIC:  Alert and oriented x3.  Normal mood and affect.  Midline tongue.    No speech difficulty or hoarseness, 5/5 motor strength in all extremities.    IMAGING:  ABIs are 0.94 on the right and 0.60 on the left.  However, PVRs are   completely flat at the ankle and very, very diminished at the calf,  all   consistent with severe peripheral arterial occlusive disease.    Arterial duplex done at [facility] recently failed to show any stenosis   from the left common femoral distally (except for peroneal artery occlusion),   but noted monophasic waveforms throughout.    ASSESSMENT:  Severe left peripheral arterial occlusive disease with ischemic   rest pain.    His physical exam would not suggest a primary inflow stenosis or occlusion,   which was what had frankly been suggested by the outside arterial duplex.  Given   his bounding femoral pulse on the left side, this would appear that there   indeed is severe, mostly infrainguinal occlusive disease.  As such, I will   proceed directly with an arteriogram rather than a CTA.    PLAN:  1.  Right common femoral artery approach aortogram, bilateral iliac angiogram,   selective left lower extremity angiogram and potential intervention, 03/29/2018.  2.  Hold recently started Plavix for 5-7 days.  3.  Dye prep given his shellfish allergy.    The patient understands the risks and rationale of the procedure and wishes to   proceed.      NAFISA  dd: 03/20/2018 10:52:10 (CDT)  td: 03/21/2018 09:09:08 (CDT)  Doc ID   #[id number]  Job ID #[id number]    CC:

## 2018-03-29 NOTE — PLAN OF CARE
Hydralazine given by Jie DIGGS as per Dr tan bedside order. Dr Orellana at bedside holding pressure. Dr Orellana states that he beleives that the patient may have a residual hematoma. Sandbag placed to R groin.

## 2018-03-29 NOTE — DISCHARGE INSTRUCTIONS
Aortic Angiogram  An aortic angiogram is a test that takes pictures of the aorta. This is the main blood vessel that carries blood from your heart to the rest of your body. The test can show problems with your aorta, such as a blockage or an aneurysm (a balloon-like bulge in the wall of the aorta).    Before the test  Prepare for the test as youve been told. In addition:  · Tell your healthcare provider if you:  ¨ Take any medicines. This includes over-the-counter drugs, herbal medicines and other supplements (You may need to stop taking some or all of them before the test and in some cases, for a couple of days after)  ¨ Are allergic or have any intolerances to any medicines, iodine, contrast fluid (dye), or if you have ever had a reaction to substances used during other imaging tests  ¨ Have other health problems, such as diabetes or kidney problems  ¨ Use tobacco or alcohol on a regular basis  ¨ Are pregnant or may be pregnant  ¨ Are breastfeeding  · Follow any directions youre given for not eating or drinking before the test  The day of the test  This test takes about 1 to 2 hours. The entire test (including time to prepare and recover) takes about 4 to 6 hours. Youll likely go home the same day.  Before the test begins:  · An IV line is put into a vein in your arm or hand. This line supplies fluids and medicine.  · Youll be given medicine (anesthesia) to keep you free of pain during the test. You may receive sedation, which makes you relaxed and sleepy. Local anesthesia is also injected into the skin to numb the area to be worked on (groin or arm).  During the test:  · A small puncture or cut is made in the numbed skin.   · A thin tube called a catheter is put through the puncture or cut and into a blood vessel.  · The catheter is slowly advanced through the blood vessel and into the aorta. Live X-rays are used to help guide this process. The X-rays are viewed on a video screen.  · Once the catheter is in  the correct place, contrast fluid (dye) is sent through the catheter. X-ray pictures are then taken of the aorta. The contrast fluid (dye) makes the aorta more visible on the pictures.  · When the test is done, the catheter is removed. Pressure is applied to the insertion site to stop any bleeding. The site is then bandaged.   After the test:  · Youll need to lie still for 4 to 6 hours. This lessens the chance of bleeding at the insertion site.  · When its time for you to be released from the hospital, have an adult family member or friend ready to drive you home.  Recovering at home  Once at home, follow any instructions youre given. Be sure to:  · Drink plenty of water to help flush any remaining contrast fluid out of your body. This may take up to 24 hours.  · Take all medicines as directed. Some medicines should not be resumed after the angiogram to prevent an interaction with the contrast dye or damage to your kidneys. Be sure to ask your healthcare team about any potential reactions.  · Care for the catheter insertion site as directed. You may remove the bandage after 24 hours.  · Check for signs of infection at the catheter insertion site (see below).  · Do not bathe or shower until your bandage is removed. If you wish, you may wash with a sponge or washcloth.  · Avoid heavy lifting or other strenuous activities as directed to prevent the site from opening and bleeding.  When to call your healthcare provider  Call your healthcare provider if you have any of the following:  · Fever of 100.4°F (38°C) or higher, or as directed by your healthcare provider  · Signs of allergic reaction to the contrast fluid, such as rash, nausea or vomiting, or trouble breathing  · Signs of infection at the catheter insertion site, such as increased pain, redness, swelling, warmth, bleeding, or foul-smelling discharge  · Coldness, tingling, or numbness in the arm or leg near the catheter insertion site  · Rapid, pounding, or  irregular heartbeat  · Sudden pain or swelling in the legs   Follow-up care  The doctor will likely discuss initial results with you shortly after the test. A follow-up visit may also need to be scheduled.  Risks and complications  Risks and complications can include:  · Bleeding  · Infection  · Blood clots  · Damage to the aorta  · Damage to the blood vessel used during the test  · Abnormal heart rhythm  · Problems due to the contrast fluid, including allergic reaction or kidney damage  · Risks of anesthesia or any other medicines used during the test (these risks will be discussed with you before the test)   Date Last Reviewed: 2/26/2016  © [id number] [employer]. [address] [place] [id number]. All rights reserved. This information is not intended as a substitute for professional medical care. Always follow your healthcare professional's instructions.

## 2018-03-29 NOTE — ANESTHESIA PREPROCEDURE EVALUATION
03/29/2018  Ayaan Ricks is a 73 y.o., male.    Anesthesia Evaluation    I have reviewed the Patient Summary Reports.     I have reviewed the Medications.     Review of Systems  Anesthesia Hx:  No problems with previous Anesthesia  History of prior surgery of interest to airway management or planning: Previous anesthesia: General   Social:  Former Smoker, Social Alcohol Use    Hematology/Oncology:  Hematology Normal   Oncology Normal     EENT/Dental:EENT/Dental Normal   Cardiovascular:   Exercise tolerance: poor Hypertension, well controlled    Pulmonary:  Pulmonary Normal    Renal/:  Renal/ Normal     Hepatic/GI:  Hepatic/GI Normal    Musculoskeletal:  Musculoskeletal Normal    Neurological:  Neurology Normal    Endocrine:  Endocrine Normal    Dermatological:  Skin Normal    Psych:  Psychiatric Normal           Physical Exam  General:  Well nourished    Airway/Jaw/Neck:  Airway Findings: Mouth Opening: Normal Tongue: Normal  General Airway Assessment: Adult  Mallampati: II  TM Distance: Normal, at least 6 cm  Jaw/Neck Findings:  Neck ROM: Normal ROM      Dental:  Dental Findings: In tact        Mental Status:  Mental Status Findings:  Cooperative, Alert and Oriented         Anesthesia Plan  Type of Anesthesia, risks & benefits discussed:  Anesthesia Type:  MAC, general  Patient's Preference: MAC  Intra-op Monitoring Plan: standard ASA monitors  Intra-op Monitoring Plan Comments:   Post Op Pain Control Plan:   Post Op Pain Control Plan Comments:   Induction:   IV  Beta Blocker:  Patient is not currently on a Beta-Blocker (No further documentation required).       Informed Consent: Patient understands risks and agrees with Anesthesia plan.  Questions answered. Anesthesia consent signed with patient.  ASA Score: 3     Day of Surgery Review of History & Physical:  There are no significant changes.  H&P  update referred to the surgeon.         Ready For Surgery From Anesthesia Perspective.

## 2018-03-29 NOTE — BRIEF OP NOTE
[facility]  Brief Operative Note     SUMMARY     Surgery Date: 3/29/2018     Surgeon(s) and Role:     * Marilee Waters MD - Fellow     * BARTOLOME Mahmood III, MD - Primary    Assisting Surgeon: None    Pre-op Diagnosis:  Atherosclerotic peripheral vascular disease with rest pain [I70.229]    Post-op Diagnosis:  Post-Op Diagnosis Codes:     * Atherosclerotic peripheral vascular disease with rest pain [I70.229]    PROCEDURES:    1. Aortagram and selective L LE angiogram  2. US guided R CFA access    Anesthesia: Local MAC    Description of the findings of the procedure: Pop and prox tibial occlusion, PT/peroneal reconstitution    Findings/Key Components: as above; R 5 fr/mynx closure; 3.o min fluoro;141 mGy; 49 cc visipaque    Estimated Blood Loss: <5cc         Specimens:   Specimen (12h ago through future)    None          Discharge Note    SUMMARY     Admit Date: 3/29/2018    Discharge Date and Time: 3/29/18    Hospital Course (synopsis of major diagnoses, care, treatment, and services provided during the course of the hospital stay): successful outpatient procedure    Final Diagnosis: Post-Op Diagnosis Codes:     * Atherosclerotic peripheral vascular disease with rest pain [I70.229]    Disposition: home    Follow Up/Patient Instructions:  Diet: renal  Act: ad marco a  FU: 1 week with saphenous vein mapping    Medications: pre-op

## 2018-03-29 NOTE — TRANSFER OF CARE
"Anesthesia Transfer of Care Note    Patient: Ayaan Ricks    Procedure(s) Performed: Procedure(s) (LRB):  Selective Angiogram- Extremity-Lower (Left)  AORTOGRAM (N/A)    Patient location: PACU    Anesthesia Type: MAC    Transport from OR: Transported from OR on room air with adequate spontaneous ventilation    Post pain: adequate analgesia    Post assessment: no apparent anesthetic complications and tolerated procedure well    Post vital signs: stable    Level of consciousness: awake, alert and oriented    Nausea/Vomiting: no nausea/vomiting    Complications: none    Transfer of care protocol was followed      Last vitals:   Visit Vitals  /80   Pulse 79   Temp 36.6 °C (97.8 °F) (Oral)   Resp 16   Ht 5' 10" (1.778 m)   Wt 54.4 kg (120 lb)   SpO2 100%   BMI 17.22 kg/m²     "

## 2018-03-29 NOTE — OP NOTE
[facility]  Vascular Surgery  Operative Note    SUMMARY     Date of Procedure: 3/29/2018     Procedure:  1. Aortogram    2. Left lower extremity angiogram    3. US guided R CFA access    Surgeon(s) and Role:     * Marilee Waters MD - Fellow     * BARTOLOME Mahmood III, MD - Primary    Assisting Surgeon: None    Pre-Operative Diagnosis: Atherosclerotic peripheral vascular disease with rest pain [I70.229]    Post-Operative Diagnosis: Post-Op Diagnosis Codes:     * Atherosclerotic peripheral vascular disease with rest pain [I70.229]    Anesthesia: Local MAC    Indication for operation:  74 yo M with h/o HTN with rest pain of the left foot.     Description of the Findings of the Procedure: occlusion of the retrogeniculate popliteal artery      Complications: No    Estimated Blood Loss (EBL): 10 mL           Implants: none    Specimens:   Specimen (12h ago through future)    None                  Condition: Good    Disposition: PACU - hemodynamically stable.    Operation in detail:  After an informed consent was obtained the patient was brought to the interventional suite and placed in the supine position. Both the patient and procedure were confirmed and identified during timeout process. The patient received perioperative antibiotics. The patient's bilateral groins were prepped and draped in usual sterile fashion. Using ultrasound guidance, the right common femoral artery vessel patency was confirmed. This was accessed with micropuncture needle and wire followed by J wire and 5 Slovenian sheath. Sheathogram was performed demonstrating patent R CFA, SFA and profunda femoris. Then under fluoroscopic guidance, an 0.035-in angled wire was placed into the distal aorta.  Next a Omniflush-catheter was placed over the wire and into the distal aorta under fluoroscopy and an aortogram was performed. This demonstrated tortuous bilateral common iliac and external iliac arteries; bilateral BIBIANA, EIA and  internal iliac arteries were patent with no flow limiting stenosis.  The left common iliac artery was selected with the stiff angled Glidewire with advancement of the omniflush to the distal left external iliac artery and left lower extremity angiogram was performed demonstrating: patent L CFA, SFA and profunda femoris arteries.  The SFA had evidence of calcified disease throughout artery.  The retrogeniculate popliteal artery occluded with large geniculate collaterals present. Distally the PT and peroneal artery reconstituted with the PT as the dominant vessel with flow into the foot. Based on these images decision made to offer the patient enrollment in the BEST trial.  5 [place] Mynx device was deployed.  The patient tolerated the procedure well and was transferred to the pacu for further recovery.

## 2018-03-29 NOTE — PLAN OF CARE
Dr. Aaron notified that patient is ready to leave. States he can sit up but would like to see him before he is discharged. Patient notified. Verbalizes understanding.

## 2018-03-29 NOTE — PLAN OF CARE
Discharge instructions given. Patient verbalized understanding. Paper prescriptions given. Consents in chart. No complaints at this time.

## 2018-03-30 NOTE — ANESTHESIA POSTPROCEDURE EVALUATION
"Anesthesia Post Evaluation    Patient: Ayaan Ricks    Procedure(s) Performed: Procedure(s) (LRB):  Selective Angiogram- Extremity-Lower (Left)  AORTOGRAM (N/A)    Final Anesthesia Type: MAC  Patient location during evaluation: PACU  Patient participation: Yes- Able to Participate  Level of consciousness: awake and alert  Post-procedure vital signs: reviewed and stable  Pain management: adequate  Airway patency: patent  PONV status at discharge: No PONV  Anesthetic complications: no      Cardiovascular status: blood pressure returned to baseline  Respiratory status: unassisted  Hydration status: euvolemic  Follow-up not needed.        Visit Vitals  /65 (BP Location: Left arm, Patient Position: Lying)   Pulse 89   Temp 36.6 °C (97.9 °F) (Temporal)   Resp 18   Ht 5' 10" (1.778 m)   Wt 54.4 kg (120 lb)   SpO2 100%   BMI 17.22 kg/m²       Pain/Alden Score: Pain Assessment Performed: Yes (3/29/2018  1:36 PM)  Presence of Pain: complains of pain/discomfort (3/29/2018  1:36 PM)  Pain Rating Prior to Med Admin: 4 (3/29/2018  1:12 PM)  Alden Score: 10 (3/29/2018  9:46 AM)      "

## 2018-04-03 ENCOUNTER — HOSPITAL ENCOUNTER (OUTPATIENT)
Dept: VASCULAR SURGERY | Facility: CLINIC | Age: 73
Discharge: HOME | End: 2018-04-03
Attending: SURGERY
Payer: MEDICARE

## 2018-04-03 ENCOUNTER — TELEPHONE (OUTPATIENT)
Dept: VASCULAR SURGERY | Facility: CLINIC | Age: 73
End: 2018-04-03

## 2018-04-03 ENCOUNTER — OFFICE VISIT (OUTPATIENT)
Dept: VASCULAR SURGERY | Facility: CLINIC | Age: 73
End: 2018-04-03
Payer: MEDICARE

## 2018-04-03 VITALS
HEART RATE: 72 BPM | SYSTOLIC BLOOD PRESSURE: 143 MMHG | BODY MASS INDEX: 16.79 KG/M2 | WEIGHT: 117.31 LBS | TEMPERATURE: 98 F | DIASTOLIC BLOOD PRESSURE: 79 MMHG | HEIGHT: 70 IN

## 2018-04-03 DIAGNOSIS — I70.229 ATHEROSCLEROTIC PERIPHERAL VASCULAR DISEASE WITH REST PAIN: ICD-10-CM

## 2018-04-03 DIAGNOSIS — I70.229 ATHEROSCLEROTIC PERIPHERAL VASCULAR DISEASE WITH REST PAIN: Primary | ICD-10-CM

## 2018-04-03 DIAGNOSIS — I73.9 PERIPHERAL ARTERIAL DISEASE: ICD-10-CM

## 2018-04-03 DIAGNOSIS — Z01.818 ENCOUNTER FOR OTHER PREPROCEDURAL EXAMINATION: ICD-10-CM

## 2018-04-03 PROCEDURE — 99215 OFFICE O/P EST HI 40 MIN: CPT | Mod: S$GLB,,, | Performed by: SURGERY

## 2018-04-03 PROCEDURE — 99999 PR PBB SHADOW E&M-EST. PATIENT-LVL III: CPT | Mod: PBBFAC,,, | Performed by: SURGERY

## 2018-04-03 PROCEDURE — 93923 UPR/LXTR ART STDY 3+ LVLS: CPT | Mod: S$GLB,,, | Performed by: SURGERY

## 2018-04-03 PROCEDURE — 93926 LOWER EXTREMITY STUDY: CPT | Mod: S$GLB,,, | Performed by: SURGERY

## 2018-04-03 PROCEDURE — 3078F DIAST BP <80 MM HG: CPT | Mod: CPTII,S$GLB,, | Performed by: SURGERY

## 2018-04-03 PROCEDURE — 3077F SYST BP >= 140 MM HG: CPT | Mod: CPTII,S$GLB,, | Performed by: SURGERY

## 2018-04-03 NOTE — PROGRESS NOTES
HISTORY OF PRESENT ILLNESS:  A 73-year-old male with a history of short   distance, less than half a block left calf claudication and classic ischemic   rest pain of the left foot, which has all been present for approximately 10   months.  He has had no recent exacerbation.    HE now returns after a diagnostic angio 3/29/18    PAST MEDICAL HISTORY:  1.  Blindness in the left eye.  2.  Hypertension.    PAST SURGICAL HISTORY:  1.  Hernia repair.  2.  Colon surgery.    FAMILY HISTORY:  Nil.    SOCIAL HISTORY:  Former smoker, quitting in October 2017.    MEDICATIONS:  Include statin and recently started on Plavix.    ALLERGIES:  Shellfish.    REVIEW OF SYSTEMS:  CARDIOVASCULAR:  Denies stroke or pain or DVT.  All other systems including eyes, ENT, , musculoskeletal, breast, psychiatric,   lymph, allergy and immune are negative.    PHYSICAL EXAMINATION:  VITAL SIGNS:  See nursing notes.  GENERAL:  No acute distress.  RESPIRATORY:  Normal effort.  Clear to auscultation.  CARDIOVASCULAR:  Regular rate and rhythm, nondisplaced PMI, no murmur.  VASCULAR:  2+ radial and femoral pulses, which are bounding 2+ bilaterally.    Right posterior tibial pulse is 1-2+ palpable.  Left pedal pulses are not   Palpable.    R femoral puncture site had signif purulence discharge.    EXTREMITIES:  No edema, varicosities, ulcerations, gangrene or digital   petechiae.  ABDOMEN:  No masses or tenderness.  No hepatosplenomegaly.  Aorta cannot be   palpated.  EYE:  Normal conjunctivae and lids.  ENT:  Fair dentition.  NECK:  No JVD or thyromegaly.  MUSCULOSKELETAL:  No kyphosis or scoliosis.  Extremities without clubbing or   cyanosis.  SKIN:  Warm and dry.  NEUROLOGIC:  Alert and oriented x3.  Normal mood and affect.  Midline tongue.    No speech difficulty or hoarseness, 5/5 motor strength in all extremities.    IMAGING:  Prior ABIs are 0.94 on the right and 0.60 on the left.  However, PVRs are   completely flat at the ankle and very, very  diminished at the calf, all   consistent with severe peripheral arterial occlusive disease.    Angio showed L pop occlusion with prox PT/peroneal recons    US shows no R CFA pseudoaneurysm    ASSESSMENT:      1. Severe left peripheral arterial occlusive disease with ischemic   rest pain.  He is interested in the BEST trial  2.  Superficial R femoral access site infection (Mynx used)    PLAN:  1.  I/D of R superficial access site infection 4/4/18 (wed)  2. Local/MAC    3.  F/U about enrollment in BEST 4/4/18    If he were to randomize to endo, his very thin body habitus would make an antegrade LEFT femoral approach quite approachable.    DENITA Mahmood III, MD, FACS  Professor and Chief, Vascular and Endovascular Surgery

## 2018-04-04 ENCOUNTER — SURGERY (OUTPATIENT)
Age: 73
End: 2018-04-04

## 2018-04-04 ENCOUNTER — HOSPITAL ENCOUNTER (OUTPATIENT)
Facility: HOSPITAL | Age: 73
Discharge: HOME | End: 2018-04-04
Attending: SURGERY | Admitting: SURGERY
Payer: MEDICARE

## 2018-04-04 ENCOUNTER — ANESTHESIA EVENT (OUTPATIENT)
Dept: SURGERY | Facility: HOSPITAL | Age: 73
End: 2018-04-04
Payer: MEDICARE

## 2018-04-04 ENCOUNTER — ANESTHESIA (OUTPATIENT)
Dept: SURGERY | Facility: HOSPITAL | Age: 73
End: 2018-04-04
Payer: MEDICARE

## 2018-04-04 VITALS
DIASTOLIC BLOOD PRESSURE: 58 MMHG | RESPIRATION RATE: 16 BRPM | HEIGHT: 70 IN | HEART RATE: 66 BPM | WEIGHT: 120 LBS | OXYGEN SATURATION: 96 % | TEMPERATURE: 98 F | SYSTOLIC BLOOD PRESSURE: 124 MMHG | BODY MASS INDEX: 17.18 KG/M2

## 2018-04-04 DIAGNOSIS — L02.91 ABSCESS: ICD-10-CM

## 2018-04-04 DIAGNOSIS — I73.9 PVD (PERIPHERAL VASCULAR DISEASE) WITH CLAUDICATION: Primary | ICD-10-CM

## 2018-04-04 PROCEDURE — 71000015 HC POSTOP RECOV 1ST HR: Performed by: SURGERY

## 2018-04-04 PROCEDURE — 36000708 HC OR TIME LEV III 1ST 15 MIN: Performed by: SURGERY

## 2018-04-04 PROCEDURE — D9220A PRA ANESTHESIA: Mod: ,,, | Performed by: ANESTHESIOLOGY

## 2018-04-04 PROCEDURE — 36000709 HC OR TIME LEV III EA ADD 15 MIN: Performed by: SURGERY

## 2018-04-04 PROCEDURE — 37000009 HC ANESTHESIA EA ADD 15 MINS: Performed by: SURGERY

## 2018-04-04 PROCEDURE — 25000003 PHARM REV CODE 250: Performed by: SURGERY

## 2018-04-04 PROCEDURE — 25000003 PHARM REV CODE 250: Performed by: ANESTHESIOLOGY

## 2018-04-04 PROCEDURE — 27201423 OPTIME MED/SURG SUP & DEVICES STERILE SUPPLY: Performed by: SURGERY

## 2018-04-04 PROCEDURE — 63600175 PHARM REV CODE 636 W HCPCS: Performed by: ANESTHESIOLOGY

## 2018-04-04 PROCEDURE — 10060 I&D ABSCESS SIMPLE/SINGLE: CPT | Mod: ,,, | Performed by: SURGERY

## 2018-04-04 PROCEDURE — 37000008 HC ANESTHESIA 1ST 15 MINUTES: Performed by: SURGERY

## 2018-04-04 RX ORDER — AMOXICILLIN 250 MG
1 CAPSULE ORAL 2 TIMES DAILY
COMMUNITY
Start: 2018-04-04 | End: 2019-01-29

## 2018-04-04 RX ORDER — PROPOFOL 10 MG/ML
VIAL (ML) INTRAVENOUS CONTINUOUS PRN
Status: DISCONTINUED | OUTPATIENT
Start: 2018-04-04 | End: 2018-04-04

## 2018-04-04 RX ORDER — HYDROCODONE BITARTRATE AND ACETAMINOPHEN 5; 325 MG/1; MG/1
1 TABLET ORAL EVERY 4 HOURS PRN
Status: DISCONTINUED | OUTPATIENT
Start: 2018-04-04 | End: 2018-04-04 | Stop reason: HOSPADM

## 2018-04-04 RX ORDER — SODIUM CHLORIDE 9 MG/ML
INJECTION, SOLUTION INTRAVENOUS CONTINUOUS
Status: DISCONTINUED | OUTPATIENT
Start: 2018-04-04 | End: 2018-04-04 | Stop reason: HOSPADM

## 2018-04-04 RX ORDER — LIDOCAINE HYDROCHLORIDE 10 MG/ML
1 INJECTION, SOLUTION EPIDURAL; INFILTRATION; INTRACAUDAL; PERINEURAL ONCE
Status: COMPLETED | OUTPATIENT
Start: 2018-04-04 | End: 2018-04-04

## 2018-04-04 RX ORDER — DOXYCYCLINE HYCLATE 100 MG
100 TABLET ORAL 2 TIMES DAILY
Qty: 14 TABLET | Refills: 0 | Status: SHIPPED | OUTPATIENT
Start: 2018-04-04 | End: 2018-04-11

## 2018-04-04 RX ORDER — HYDROCODONE BITARTRATE AND ACETAMINOPHEN 5; 325 MG/1; MG/1
1 TABLET ORAL EVERY 6 HOURS PRN
Qty: 10 TABLET | Refills: 0 | Status: ON HOLD | OUTPATIENT
Start: 2018-04-04 | End: 2018-04-23 | Stop reason: HOSPADM

## 2018-04-04 RX ORDER — BACITRACIN 50000 [IU]/1
INJECTION, POWDER, FOR SOLUTION INTRAMUSCULAR
Status: DISCONTINUED | OUTPATIENT
Start: 2018-04-04 | End: 2018-04-04 | Stop reason: HOSPADM

## 2018-04-04 RX ORDER — SODIUM CHLORIDE 0.9 % (FLUSH) 0.9 %
3 SYRINGE (ML) INJECTION
Status: DISCONTINUED | OUTPATIENT
Start: 2018-04-04 | End: 2018-04-04 | Stop reason: HOSPADM

## 2018-04-04 RX ORDER — CEFAZOLIN SODIUM 1 G/3ML
2 INJECTION, POWDER, FOR SOLUTION INTRAMUSCULAR; INTRAVENOUS
Status: CANCELLED | OUTPATIENT
Start: 2018-04-04

## 2018-04-04 RX ORDER — LIDOCAINE HCL/PF 100 MG/5ML
SYRINGE (ML) INTRAVENOUS
Status: DISCONTINUED | OUTPATIENT
Start: 2018-04-04 | End: 2018-04-04

## 2018-04-04 RX ORDER — MUPIROCIN 20 MG/G
OINTMENT TOPICAL
Status: DISCONTINUED | OUTPATIENT
Start: 2018-04-04 | End: 2018-04-04 | Stop reason: HOSPADM

## 2018-04-04 RX ORDER — PROPOFOL 10 MG/ML
VIAL (ML) INTRAVENOUS
Status: DISCONTINUED | OUTPATIENT
Start: 2018-04-04 | End: 2018-04-04

## 2018-04-04 RX ORDER — SODIUM CHLORIDE 9 MG/ML
INJECTION, SOLUTION INTRAVENOUS CONTINUOUS
Status: CANCELLED | OUTPATIENT
Start: 2018-04-04

## 2018-04-04 RX ORDER — MIDAZOLAM HYDROCHLORIDE 1 MG/ML
INJECTION, SOLUTION INTRAMUSCULAR; INTRAVENOUS
Status: DISCONTINUED | OUTPATIENT
Start: 2018-04-04 | End: 2018-04-04

## 2018-04-04 RX ORDER — LIDOCAINE HYDROCHLORIDE 10 MG/ML
1 INJECTION, SOLUTION EPIDURAL; INFILTRATION; INTRACAUDAL; PERINEURAL ONCE
Status: CANCELLED | OUTPATIENT
Start: 2018-04-04 | End: 2018-04-04

## 2018-04-04 RX ORDER — LIDOCAINE HYDROCHLORIDE 10 MG/ML
INJECTION INFILTRATION; PERINEURAL
Status: DISCONTINUED | OUTPATIENT
Start: 2018-04-04 | End: 2018-04-04 | Stop reason: HOSPADM

## 2018-04-04 RX ORDER — MUPIROCIN 20 MG/G
OINTMENT TOPICAL
Status: CANCELLED | OUTPATIENT
Start: 2018-04-04

## 2018-04-04 RX ADMIN — DEXTROSE 2 G: 50 INJECTION, SOLUTION INTRAVENOUS at 02:04

## 2018-04-04 RX ADMIN — LIDOCAINE HYDROCHLORIDE 70 MG: 20 INJECTION, SOLUTION INTRAVENOUS at 02:04

## 2018-04-04 RX ADMIN — LIDOCAINE HYDROCHLORIDE 0.2 MG: 10 INJECTION, SOLUTION EPIDURAL; INFILTRATION; INTRACAUDAL; PERINEURAL at 12:04

## 2018-04-04 RX ADMIN — PROPOFOL 20 MG: 10 INJECTION, EMULSION INTRAVENOUS at 02:04

## 2018-04-04 RX ADMIN — MUPIROCIN: 20 OINTMENT TOPICAL at 12:04

## 2018-04-04 RX ADMIN — PROPOFOL 50 MCG/KG/MIN: 10 INJECTION, EMULSION INTRAVENOUS at 02:04

## 2018-04-04 RX ADMIN — SODIUM CHLORIDE: 0.9 INJECTION, SOLUTION INTRAVENOUS at 12:04

## 2018-04-04 RX ADMIN — LIDOCAINE HYDROCHLORIDE 5 ML: 10 INJECTION, SOLUTION INFILTRATION; PERINEURAL at 02:04

## 2018-04-04 RX ADMIN — MIDAZOLAM HYDROCHLORIDE 2 MG: 1 INJECTION, SOLUTION INTRAMUSCULAR; INTRAVENOUS at 01:04

## 2018-04-04 RX ADMIN — BACITRACIN 50000 UNITS: 50000 INJECTION, POWDER, LYOPHILIZED, FOR SOLUTION INTRAMUSCULAR at 02:04

## 2018-04-04 NOTE — OP NOTE
4/4/2018    Surgeon(s) and Role:     * BARTOLOME Mahmood III, MD - Primary     Assisting Surgeon: None     Pre-op Diagnosis:  R/o R femoral abscess     Post-op Diagnosis:   Same     Procedure(s) (LRB):  INCISION AND DRAINAGE (I & D) FEMORAL (Right)     Anesthesia: Local MAC     Description of the findings of the procedure: no purulence found     Findings/Key Components:      Estimated Blood Loss: <5cc           Complications:  None; patient tolerated the procedure well.           Disposition: recovery.           Condition: stable    Procedure Details:  The patient was seen in the Holding Room. The risks, benefits, complications, treatment options, and expected outcomes were discussed with the patient. The patient concurred with the proposed plan, giving informed consent.  The site of surgery properly noted/marked.   Patient was brought to the operating room and positioned supine on the table. Conscious sedation was administered by anesthesia team. Patient's right groin was prepped and draped in usual sterile fashion. A Time Out was held and the above information confirmed.  1% lidocaine was injected over the groin mass. Through the previous access tract, Bernardo forceps were introduced and longitudinal incision was made over the groin bulge using #15 blade. No purulence was noted. Plug of the mynx closure device was trimmed. Wound was irrigated with copious amounts of bacitracin irrigation. Wound was closed with simple interrupted 3-0 Nylon sutures. Dry dressings were placed.     Dr. Guzman was scrubbed and present for entire procedure.    Jeff Orellana MD  Vascular/Endovascular Surgery Fellow

## 2018-04-04 NOTE — INTERVAL H&P NOTE
The patient has been examined and the H&P has been reviewed:    I concur with the findings and no changes have occurred since H&P was written.    Anesthesia/Surgery risks, benefits and alternative options discussed and understood by patient/family.          Active Hospital Problems    Diagnosis  POA    Abscess [L02.91]  Yes      Resolved Hospital Problems    Diagnosis Date Resolved POA   No resolved problems to display.

## 2018-04-04 NOTE — BRIEF OP NOTE
[facility]  Brief Operative Note     SUMMARY     Surgery Date: 4/4/2018     Surgeon(s) and Role:     * BARTOLOME Mahmood III, MD - Primary    Assisting Surgeon: None    Pre-op Diagnosis:  Atherosclerotic peripheral vascular disease with rest pain [I70.229]    Post-op Diagnosis:  Post-Op Diagnosis Codes:     * Atherosclerotic peripheral vascular disease with rest pain [I70.229]    Procedure(s) (LRB):  INCISION AND DRAINAGE (I & D) FEMORAL (Right)    Anesthesia: Local MAC    Description of the findings of the procedure: Small incision made in right groin. No purulence was encountered. 3 nylon interrupted sutures were placed.    Findings/Key Components: No purulence encountered, Copious irrigation was used before closing.    Estimated Blood Loss: * No values recorded between 4/4/2018  2:23 PM and 4/4/2018  2:43 PM *         Specimens:   Specimen (12h ago through future)    None          Discharge Note    SUMMARY     Admit Date: 4/4/2018    Discharge Date and Time:  04/04/2018 2:57 PM    Hospital Course (synopsis of major diagnoses, care, treatment, and services provided during the course of the hospital stay): Patient arrived for scheduled procedure. Patient tolerated procedure well. Patient was discharged after recovery.     Final Diagnosis: Post-Op Diagnosis Codes:     * Atherosclerotic peripheral vascular disease with rest pain [I70.229]    Disposition: Home or Self Care    Follow Up/Patient Instructions:     Medications:  Reconciled Home Medications:      Medication List      START taking these medications    senna-docusate 8.6-50 mg 8.6-50 mg per tablet  Commonly known as:  PERICOLACE  Take 1 tablet by mouth 2 (two) times daily.        CHANGE how you take these medications    * hydrocodone-acetaminophen 5-325mg 5-325 mg per tablet  Commonly known as:  NORCO  Take 1 tablet by mouth every 6 (six) hours as needed for Pain.  What changed:  Another medication with the same name was added. Make sure  you understand how and when to take each.     * hydrocodone-acetaminophen 5-325mg 5-325 mg per tablet  Commonly known as:  NORCO  Take 1 tablet by mouth every 6 (six) hours as needed for Pain.  What changed:  You were already taking a medication with the same name, and this prescription was added. Make sure you understand how and when to take each.        * This list has 2 medication(s) that are the same as other medications prescribed for you. Read the directions carefully, and ask your doctor or other care provider to review them with you.            CONTINUE taking these medications    amLODIPine 5 MG tablet  Commonly known as:  NORVASC     clopidogrel 75 mg tablet  Commonly known as:  PLAVIX     ferrous sulfate 324 mg (65 mg iron) Tbec     FLOMAX 0.4 mg Cp24  Generic drug:  tamsulosin     rosuvastatin 20 MG tablet  Commonly known as:  CRESTOR     valsartan-hydrochlorothiazide 160-25 mg per tablet  Commonly known as:  DIOVAN-HCT           Where to Get Your Medications      You can get these medications from any pharmacy    Bring a paper prescription for each of these medications  · hydrocodone-acetaminophen 5-325mg 5-325 mg per tablet  You don't need a prescription for these medications  · senna-docusate 8.6-50 mg 8.6-50 mg per tablet         Discharge Procedure Orders  Diet general     Activity as tolerated     Call MD for:  temperature >100.4     Call MD for:  persistent nausea and vomiting     Call MD for:  severe uncontrolled pain     Call MD for:  redness, tenderness, or signs of infection (pain, swelling, redness, odor or green/yellow discharge around incision site)     Remove dressing in 24 hours   Order Comments: OK to place bandage over if draining fluid, but bandage not necessary  OK to shower in 48 hours with warm soap and water       Follow-up Information     BARTOLOME Mahmood Iii, MD.    Specialty:  Vascular Surgery  Why:  As needed, planning to go back to OR 4/9/18  Contact information:  [id number]  [facility] [id number]  [phone]

## 2018-04-04 NOTE — DISCHARGE INSTRUCTIONS
GENERAL POST-OPERATIVE  PATIENT INSTRUCTIONS      FOLLOW-UP:  Call your physician immediately if you have any fevers greater than 101, drainage from you wound that is not clear or looks infected, persistent bleeding, increasing pain, problems urinating, or persistent nausea/vomiting.      WOUND CARE INSTRUCTIONS:  Keep a dry clean dressing on the wound if there is drainage. The initial bandage may be removed after 24 hours.  Once the wound has quit draining you may leave it open to air. You may shower after 48 hours.  If the wound becomes bright red and painful or starts to drain infected material that is not clear, please contact your physician immediately.     DIET:  You may eat any foods that you can tolerate.  It is a good idea to eat a high fiber diet and take in plenty of fluids to prevent constipation.  If you do become constipated you may want to take a mild laxative or take ducolax tablets on a daily basis until your bowel habits are regular.  Constipation can be very uncomfortable, along with straining, after recent surgery.    MEDICATIONS:  Try to take narcotic medications and anti-inflammatory medications, such as tylenol, ibuprofen, naprosyn, etc., with food.  This will minimize stomach upset from the medication.  Should you develop nausea and vomiting from the pain medication, or develop a rash, please discontinue the medication and contact your physician.  You should not drive, make important decisions, or operate machinery when taking narcotic pain medication.    QUESTIONS:  Please feel free to call your physician or the hospital  if you have any questions, and they will be glad to assist you.

## 2018-04-04 NOTE — PLAN OF CARE
D/C instructions reviewed with pt. Questions answered, handouts and prescriptions provided. Pt awake, alert, VSS, tolerating PO liquids, denies pain or nausea. Criteria met for discharge. Friend called for ride home

## 2018-04-04 NOTE — TRANSFER OF CARE
"Anesthesia Transfer of Care Note    Patient: Ayaan Ricks    Procedure(s) Performed: Procedure(s) (LRB):  INCISION AND DRAINAGE (I & D) FEMORAL (Right)    Patient location: PACU    Anesthesia Type: MAC    Transport from OR: Transported from OR on room air with adequate spontaneous ventilation    Post pain: adequate analgesia    Post assessment: no apparent anesthetic complications    Post vital signs: stable    Level of consciousness: awake, alert and oriented    Nausea/Vomiting: no nausea/vomiting    Complications: none    Transfer of care protocol was followed      Last vitals:   Visit Vitals  BP (!) 109/50 (BP Location: Left arm, Patient Position: Lying)   Pulse 63   Temp 36.9 °C (98.4 °F) (Oral)   Resp 20   Ht 5' 10" (1.778 m)   Wt 54.4 kg (120 lb)   SpO2 97%   BMI 17.22 kg/m²     "

## 2018-04-04 NOTE — BRIEF OP NOTE
[facility]  Brief Operative Note     SUMMARY     Surgery Date: 4/4/2018     Surgeon(s) and Role:     * BARTOLOME Mahmood III, MD - Primary    Assisting Surgeon: None    Pre-op Diagnosis:  R/o R femoral abscess    Post-op Diagnosis:   Same    Procedure(s) (LRB):  INCISION AND DRAINAGE (I & D) FEMORAL (Right)    Anesthesia: Local MAC    Description of the findings of the procedure: no purulence found    Findings/Key Components:     Estimated Blood Loss: <5c c         Specimens:   Specimen (12h ago through future)    None          Discharge Note    SUMMARY     Admit Date: 4/4/2018    Discharge Date and Time: 4/4/18    Hospital Course (synopsis of major diagnoses, care, treatment, and services provided during the course of the hospital stay):successful outpatient procedure     Final Diagnosis: Post-Op Diagnosis Codes: r/o R femoral abscess    Disposition: home    Follow Up/Patient Instructions: Diet: renal  Act: ad marco a  FU: 5-7 days     Medications: pre-op

## 2018-04-05 NOTE — ANESTHESIA POSTPROCEDURE EVALUATION
"Anesthesia Post Evaluation    Patient: Ayaan Ricks    Procedure(s) Performed: Procedure(s) (LRB):  INCISION AND DRAINAGE (I & D) FEMORAL (Right)    Final Anesthesia Type: MAC  Patient location during evaluation: PACU  Patient participation: Yes- Able to Participate  Level of consciousness: awake and alert  Pain management: adequate  Airway patency: patent  PONV status at discharge: No PONV  Anesthetic complications: no      Cardiovascular status: blood pressure returned to baseline  Respiratory status: unassisted, spontaneous ventilation and room air  Hydration status: euvolemic  Follow-up not needed.        Visit Vitals  BP (!) 124/58 (BP Location: Left arm, Patient Position: Lying)   Pulse 66   Temp 36.6 °C (97.9 °F) (Temporal)   Resp 16   Ht 5' 10" (1.778 m)   Wt 54.4 kg (120 lb)   SpO2 96%   BMI 17.22 kg/m²       Pain/Alden Score: Pain Assessment Performed: Yes (4/4/2018  3:48 PM)  Presence of Pain: denies (4/4/2018  3:48 PM)  Alden Score: 10 (4/4/2018  2:50 PM)      "

## 2018-04-05 NOTE — ANESTHESIA PREPROCEDURE EVALUATION
04/05/2018  Ayaan Ricks is a 73 y.o., male.    Anesthesia Evaluation    I have reviewed the Patient Summary Reports.     I have reviewed the Medications.     Review of Systems  Anesthesia Hx:  History of prior surgery of interest to airway management or planning:  Denies Personal Hx of Anesthesia complications.   Social:  Non-Smoker, No Alcohol Use    Hematology/Oncology:  Hematology Normal   Oncology Normal     EENT/Dental:EENT/Dental Normal   Cardiovascular:   Hypertension PVD    Pulmonary:  Pulmonary Normal    Renal/:  Renal/ Normal     Hepatic/GI:  Hepatic/GI Normal    Musculoskeletal:  Musculoskeletal Normal    Neurological:  Neurology Normal    Dermatological:  Skin Normal    Psych:  Psychiatric Normal           Physical Exam  General:  Well nourished    Airway/Jaw/Neck:  Airway Findings: Mouth Opening: Normal Tongue: Normal  General Airway Assessment: Adult, Good  Mallampati: III  TM Distance: Normal, at least 6 cm     Eyes/Ears/Nose:  EYES/EARS/NOSE FINDINGS: Normal   Dental:  Dental Findings: In tact   Chest/Lungs:  Chest/Lungs Findings: Clear to auscultation, Normal Respiratory Rate     Heart/Vascular:  Heart Findings: Rate: Normal  Rhythm: Regular Rhythm  Sounds: Normal  Heart murmur: negative       Mental Status:  Mental Status Findings:  Cooperative, Alert and Oriented         Anesthesia Plan  Type of Anesthesia, risks & benefits discussed:  Anesthesia Type:  MAC  Patient's Preference:   Intra-op Monitoring Plan:   Intra-op Monitoring Plan Comments:   Post Op Pain Control Plan:   Post Op Pain Control Plan Comments:   Induction:   IV  Beta Blocker:  Patient is not currently on a Beta-Blocker (No further documentation required).       Informed Consent: Patient understands risks and agrees with Anesthesia plan.  Questions answered. Anesthesia consent signed with patient.  ASA Score: 2      Day of Surgery Review of History & Physical:    H&P update referred to the surgeon.     Anesthesia Plan Notes:   73M HTN, PVD with R femoral infection after angio for I&D under local and mild-mod sedation        Ready For Surgery From Anesthesia Perspective.

## 2018-04-06 ENCOUNTER — TELEPHONE (OUTPATIENT)
Dept: VASCULAR SURGERY | Facility: CLINIC | Age: 73
End: 2018-04-06

## 2018-04-06 ENCOUNTER — ANESTHESIA EVENT (OUTPATIENT)
Dept: SURGERY | Facility: HOSPITAL | Age: 73
DRG: 253 | End: 2018-04-06
Payer: MEDICARE

## 2018-04-06 NOTE — ANESTHESIA PREPROCEDURE EVALUATION
[facility]  Anesthesia Pre-Operative Evaluation         Patient Name: Ayaan Ricks  YOB: 1944  MRN: [id number]    SUBJECTIVE:     Pre-operative evaluation for Procedure(s) (LRB):  [id number] (Left)  Scheduled for 4/9/2018    HPI 04/06/2018:  Ayaan Ricks is a 73 y.o. male with HTN, former smoking (quit 2017).    Patient has claudication of L leg and ischemic resting pain in L foot.  Diagnostic angiogram on 3/29/2018 which showed L popliteal occlusion with proximal PT/peroneal recons, US shows no R CFA pseudoaneurysm.  Recently started on plavix.  R femoral puncture site had significant purulent discharge.  On 4/4/2018 pt had I&D for femoral puncture site.    Patient presents for the above procedure(s).    Prev airway:   No prior records in Epic or i.Sec Documents.    Oxygen/Ventilation Requirements:  On room air       Patient Active Problem List   Diagnosis    Atherosclerotic peripheral vascular disease with rest pain    Essential hypertension    Peripheral arterial disease    Encounter for other preprocedural examination    Abscess    PVD (peripheral vascular disease) with claudication       Review of patient's allergies indicates:   Allergen Reactions    Shellfish containing products Nausea And Vomiting     PT REPORTS THAT HE HAS NAUSEA AND VOMITING AFTER EATING SHELLFISH. DENIES SOB, FACIAL, MOUTH OR TONGUE SWELLING       Outpatient Medications:  No current facility-administered medications on file prior to encounter.      Current Outpatient Prescriptions on File Prior to Encounter   Medication Sig Dispense Refill    amLODIPine (NORVASC) 5 MG tablet Take 5 mg by mouth once daily.      clopidogrel (PLAVIX) 75 mg tablet Take 75 mg by mouth once daily.      doxycycline (VIBRA-TABS) 100 MG tablet Take 1 tablet (100 mg total) by mouth 2 (two) times daily. 14 tablet 0    ferrous sulfate 324 mg (65 mg iron) TbEC Take 325 mg by mouth once daily.       hydrocodone-acetaminophen 5-325mg (NORCO) 5-325 mg per tablet Take 1 tablet by mouth every 6 (six) hours as needed for Pain. 40 tablet 0    hydrocodone-acetaminophen 5-325mg (NORCO) 5-325 mg per tablet Take 1 tablet by mouth every 6 (six) hours as needed for Pain. 10 tablet 0    rosuvastatin (CRESTOR) 20 MG tablet Take 20 mg by mouth once daily.      senna-docusate 8.6-50 mg (PERICOLACE) 8.6-50 mg per tablet Take 1 tablet by mouth 2 (two) times daily.      tamsulosin (FLOMAX) 0.4 mg Cp24 Take 0.4 mg by mouth once daily.      valsartan-hydrochlorothiazide (DIOVAN-HCT) 160-25 mg per tablet Take 1 tablet by mouth once daily.          Current Inpatient Medications:      Past Surgical History:   Procedure Laterality Date    COLON SURGERY      HERNIA REPAIR         Social History     Social History    Marital status: Single     Spouse name: N/A    Number of children: N/A    Years of education: N/A     Occupational History    Not on file.     Social History Main Topics    Smoking status: Former Smoker     Types: Cigarettes     Quit date: 10/19/2017    Smokeless tobacco: Never Used    Alcohol use Yes      Comment: occasional    Drug use: Yes     Types: Marijuana      Comment: 3/28/18    Sexual activity: Not on file     Other Topics Concern    Not on file     Social History Narrative    No narrative on file       OBJECTIVE:     Weight:  Wt Readings from Last 4 Encounters:   04/04/18 54.4 kg (120 lb)   04/03/18 53.2 kg (117 lb 4.6 oz)   03/29/18 54.4 kg (120 lb)   03/20/18 54 kg (119 lb 0.8 oz)       Vital Signs Range (Last 24H):         CBC:   Results for AMOR BYRNE (MRN [id number]) as of 4/6/2018 15:51   Ref. Range 3/20/2018 11:48   WBC Latest Ref Range: 3.90 - 12.70 K/uL 4.37   RBC Latest Ref Range: 4.60 - 6.20 M/uL 3.86 (L)   Hemoglobin Latest Ref Range: 14.0 - 18.0 g/dL 11.8 (L)   Hematocrit Latest Ref Range: 40.0 - 54.0 % 36.0 (L)   MCV Latest Ref Range: 82 - 98 fL 93   MCH Latest Ref Range: 27.0  - 31.0 pg 30.6   MCHC Latest Ref Range: 32.0 - 36.0 g/dL 32.8   RDW Latest Ref Range: 11.5 - 14.5 % 15.5 (H)   Platelets Latest Ref Range: 150 - 350 K/uL 242       CMP:   Results for AMOR BYRNE (MRN [id number]) as of 4/6/2018 15:51   Ref. Range 3/20/2018 11:48   Sodium Latest Ref Range: 136 - 145 mmol/L 136   Potassium Latest Ref Range: 3.5 - 5.1 mmol/L 5.1   Chloride Latest Ref Range: 95 - 110 mmol/L 104   CO2 Latest Ref Range: 23 - 29 mmol/L 25   Anion Gap Latest Ref Range: 8 - 16 mmol/L 7 (L)   BUN, Bld Latest Ref Range: 8 - 23 mg/dL 29 (H)   Creatinine Latest Ref Range: 0.5 - 1.4 mg/dL 1.4   eGFR if non African American Latest Ref Range: >60 mL/min/1.73 m^2 49.5 (A)   eGFR if African American Latest Ref Range: >60 mL/min/1.73 m^2 57.2 (A)   Glucose Latest Ref Range: 70 - 110 mg/dL 92   Calcium Latest Ref Range: 8.7 - 10.5 mg/dL 10.3       INR:  No results for input(s): INR, PROTIME, APTT in the last 72 hours.    Diagnostic Studies:  US LE 3/19/2018  1. Occlusion of the left peroneal trunk  2. Abnormal waveforms nearly throughout the left system  3. Diminished velocities throughout an otherwise patent right lower extremity system  4. Bilateral significant atherosclerosis.     EKG:  none     2D Echo:  No results found for this or any previous visit.      ASSESSMENT/PLAN:         Anesthesia Evaluation    I have reviewed the Patient Summary Reports.     I have reviewed the Medications.     Review of Systems  Anesthesia Hx:  No problems with previous Anesthesia  History of prior surgery of interest to airway management or planning:  Denies Personal Hx of Anesthesia complications.   Social:  Non-Smoker, No Alcohol Use    Hematology/Oncology:  Hematology Normal   Oncology Normal     EENT/Dental:EENT/Dental Normal   Cardiovascular:   Exercise tolerance: poor Hypertension Denies MI.   Denies CABG/stent.   Denies Angina.         PVD ECG has been reviewed. Limited by claudication   Pulmonary:  Pulmonary Normal     Renal/:   Chronic Renal Disease, CRI    Hepatic/GI:  Hepatic/GI Normal    Musculoskeletal:  Musculoskeletal Normal    Neurological:  Neurology Normal    Endocrine:   Denies Diabetes. Denies Hypothyroidism.    Dermatological:  Skin Normal    Psych:  Psychiatric Normal           Physical Exam  General:  Well nourished    Airway/Jaw/Neck:  Airway Findings: Mouth Opening: Normal Tongue: Normal  General Airway Assessment: Adult, Good  Mallampati: II  TM Distance: Normal, at least 6 cm  Jaw/Neck Findings:  Neck ROM: Normal ROM     Eyes/Ears/Nose:  EYES/EARS/NOSE FINDINGS: Normal   Dental:  Dental Findings: In tact, Periodontal disease, Severe    Chest/Lungs:  Chest/Lungs Findings: Clear to auscultation, Normal Respiratory Rate     Heart/Vascular:  Heart Findings: Rate: Normal  Rhythm: Regular Rhythm  Sounds: Normal  Heart murmur: negative       Mental Status:  Mental Status Findings:  Cooperative, Alert and Oriented         Anesthesia Plan  Type of Anesthesia, risks & benefits discussed:  Anesthesia Type:  general  Patient's Preference:   Intra-op Monitoring Plan: standard ASA monitors and arterial line  Intra-op Monitoring Plan Comments:   Post Op Pain Control Plan: multimodal analgesia, IV/PO Opioids PRN and per primary service following discharge from PACU  Post Op Pain Control Plan Comments:   Induction:   IV  Beta Blocker:  Patient is not currently on a Beta-Blocker (No further documentation required).       Informed Consent: Patient understands risks and agrees with Anesthesia plan.  Questions answered. Anesthesia consent signed with patient.  ASA Score: 3     Day of Surgery Review of History & Physical: I have interviewed and examined the patient. I have reviewed the patient's H&P dated:  There are no significant changes.  H&P update referred to the surgeon.         Ready For Surgery From Anesthesia Perspective.

## 2018-04-09 ENCOUNTER — HOSPITAL ENCOUNTER (INPATIENT)
Facility: HOSPITAL | Age: 73
LOS: 4 days | Discharge: NURSING, REHAB OR LONG-TERM CARE | DRG: 253 | End: 2018-04-13
Attending: SURGERY | Admitting: SURGERY
Payer: MEDICARE

## 2018-04-09 ENCOUNTER — ANESTHESIA (OUTPATIENT)
Dept: SURGERY | Facility: HOSPITAL | Age: 73
DRG: 253 | End: 2018-04-09
Payer: MEDICARE

## 2018-04-09 DIAGNOSIS — I70.229 ATHEROSCLEROTIC PERIPHERAL VASCULAR DISEASE WITH REST PAIN: Primary | ICD-10-CM

## 2018-04-09 DIAGNOSIS — I73.9 PERIPHERAL ARTERIAL DISEASE: ICD-10-CM

## 2018-04-09 DIAGNOSIS — I73.9 PVD (PERIPHERAL VASCULAR DISEASE) WITH CLAUDICATION: ICD-10-CM

## 2018-04-09 DIAGNOSIS — L02.91 ABSCESS: ICD-10-CM

## 2018-04-09 DIAGNOSIS — R00.0 TACHYCARDIA: ICD-10-CM

## 2018-04-09 LAB
ABO + RH BLD: NORMAL
ALBUMIN SERPL BCP-MCNC: 3.1 G/DL
ALP SERPL-CCNC: 112 U/L
ALT SERPL W/O P-5'-P-CCNC: 12 U/L
ANION GAP SERPL CALC-SCNC: 8 MMOL/L
APTT BLDCRRT: 25.1 SEC
AST SERPL-CCNC: 20 U/L
BASOPHILS # BLD AUTO: 0.02 K/UL
BASOPHILS NFR BLD: 0.2 %
BILIRUB SERPL-MCNC: 0.2 MG/DL
BLD GP AB SCN CELLS X3 SERPL QL: NORMAL
BUN SERPL-MCNC: 24 MG/DL
CALCIUM SERPL-MCNC: 8.5 MG/DL
CHLORIDE SERPL-SCNC: 107 MMOL/L
CO2 SERPL-SCNC: 22 MMOL/L
CREAT SERPL-MCNC: 1.3 MG/DL
DIFFERENTIAL METHOD: ABNORMAL
EOSINOPHIL # BLD AUTO: 0.2 K/UL
EOSINOPHIL NFR BLD: 1.6 %
ERYTHROCYTE [DISTWIDTH] IN BLOOD BY AUTOMATED COUNT: 15.3 %
EST. GFR  (AFRICAN AMERICAN): >60 ML/MIN/1.73 M^2
EST. GFR  (NON AFRICAN AMERICAN): 54.1 ML/MIN/1.73 M^2
GLUCOSE SERPL-MCNC: 175 MG/DL
GLUCOSE SERPL-MCNC: 90 MG/DL (ref 70–110)
GLUCOSE SERPL-MCNC: 92 MG/DL (ref 70–110)
HCO3 UR-SCNC: 21.2 MMOL/L (ref 24–28)
HCO3 UR-SCNC: 22.3 MMOL/L (ref 24–28)
HCT VFR BLD AUTO: 30.2 %
HCT VFR BLD CALC: 22 %PCV (ref 36–54)
HCT VFR BLD CALC: 23 %PCV (ref 36–54)
HGB BLD-MCNC: 10 G/DL
IMM GRANULOCYTES # BLD AUTO: 0.04 K/UL
IMM GRANULOCYTES NFR BLD AUTO: 0.4 %
INR PPP: 1
LYMPHOCYTES # BLD AUTO: 2.5 K/UL
LYMPHOCYTES NFR BLD: 22.6 %
MAGNESIUM SERPL-MCNC: 1.8 MG/DL
MCH RBC QN AUTO: 30.7 PG
MCHC RBC AUTO-ENTMCNC: 33.1 G/DL
MCV RBC AUTO: 93 FL
MONOCYTES # BLD AUTO: 0.6 K/UL
MONOCYTES NFR BLD: 5.4 %
NEUTROPHILS # BLD AUTO: 7.6 K/UL
NEUTROPHILS NFR BLD: 69.8 %
NRBC BLD-RTO: 0 /100 WBC
PCO2 BLDA: 29.5 MMHG (ref 35–45)
PCO2 BLDA: 31.7 MMHG (ref 35–45)
PH SMN: 7.46 [PH] (ref 7.35–7.45)
PH SMN: 7.46 [PH] (ref 7.35–7.45)
PHOSPHATE SERPL-MCNC: 3.1 MG/DL
PLATELET # BLD AUTO: 253 K/UL
PMV BLD AUTO: 9.3 FL
PO2 BLDA: 280 MMHG (ref 80–100)
PO2 BLDA: 304 MMHG (ref 80–100)
POC ACTIVATED CLOTTING TIME K: 147 SEC (ref 74–137)
POC ACTIVATED CLOTTING TIME K: 208 SEC (ref 74–137)
POC ACTIVATED CLOTTING TIME K: 213 SEC (ref 74–137)
POC ACTIVATED CLOTTING TIME K: 213 SEC (ref 74–137)
POC ACTIVATED CLOTTING TIME K: 230 SEC (ref 74–137)
POC BE: -2 MMOL/L
POC BE: -2 MMOL/L
POC IONIZED CALCIUM: 1.11 MMOL/L (ref 1.06–1.42)
POC IONIZED CALCIUM: 1.12 MMOL/L (ref 1.06–1.42)
POC SATURATED O2: 100 % (ref 95–100)
POC SATURATED O2: 100 % (ref 95–100)
POC TCO2: 22 MMOL/L (ref 23–27)
POC TCO2: 23 MMOL/L (ref 23–27)
POTASSIUM BLD-SCNC: 3.8 MMOL/L (ref 3.5–5.1)
POTASSIUM BLD-SCNC: 3.8 MMOL/L (ref 3.5–5.1)
POTASSIUM SERPL-SCNC: 4.4 MMOL/L
PROT SERPL-MCNC: 6.9 G/DL
PROTHROMBIN TIME: 10.7 SEC
RBC # BLD AUTO: 3.26 M/UL
SAMPLE: ABNORMAL
SODIUM BLD-SCNC: 139 MMOL/L (ref 136–145)
SODIUM BLD-SCNC: 141 MMOL/L (ref 136–145)
SODIUM SERPL-SCNC: 137 MMOL/L
WBC # BLD AUTO: 10.92 K/UL

## 2018-04-09 PROCEDURE — 27100025 HC TUBING, SET FLUID WARMER: Performed by: STUDENT IN AN ORGANIZED HEALTH CARE EDUCATION/TRAINING PROGRAM

## 2018-04-09 PROCEDURE — 25500020 PHARM REV CODE 255: Performed by: SURGERY

## 2018-04-09 PROCEDURE — 86850 RBC ANTIBODY SCREEN: CPT

## 2018-04-09 PROCEDURE — 10120 INC&RMVL FB SUBQ TISS SMPL: CPT | Mod: 79,51,, | Performed by: SURGERY

## 2018-04-09 PROCEDURE — 63600175 PHARM REV CODE 636 W HCPCS

## 2018-04-09 PROCEDURE — 85730 THROMBOPLASTIN TIME PARTIAL: CPT

## 2018-04-09 PROCEDURE — 63600175 PHARM REV CODE 636 W HCPCS: Performed by: ANESTHESIOLOGY

## 2018-04-09 PROCEDURE — 27201423 OPTIME MED/SURG SUP & DEVICES STERILE SUPPLY: Performed by: SURGERY

## 2018-04-09 PROCEDURE — 71000039 HC RECOVERY, EACH ADD'L HOUR: Performed by: SURGERY

## 2018-04-09 PROCEDURE — 36000708 HC OR TIME LEV III 1ST 15 MIN: Performed by: SURGERY

## 2018-04-09 PROCEDURE — 25000003 PHARM REV CODE 250: Performed by: SURGERY

## 2018-04-09 PROCEDURE — 88300 SURGICAL PATH GROSS: CPT | Performed by: PATHOLOGY

## 2018-04-09 PROCEDURE — D9220A PRA ANESTHESIA: Mod: ,,, | Performed by: ANESTHESIOLOGY

## 2018-04-09 PROCEDURE — 63600175 PHARM REV CODE 636 W HCPCS: Performed by: SURGERY

## 2018-04-09 PROCEDURE — 84100 ASSAY OF PHOSPHORUS: CPT

## 2018-04-09 PROCEDURE — C1894 INTRO/SHEATH, NON-LASER: HCPCS | Performed by: SURGERY

## 2018-04-09 PROCEDURE — 36000709 HC OR TIME LEV III EA ADD 15 MIN: Performed by: SURGERY

## 2018-04-09 PROCEDURE — 35566 ART BYP FEM-ANT-POST TIB/PRL: CPT | Mod: 79,LT,, | Performed by: SURGERY

## 2018-04-09 PROCEDURE — 20600001 HC STEP DOWN PRIVATE ROOM

## 2018-04-09 PROCEDURE — 061N0AY BYPASS LEFT FEMORAL VEIN TO LOWER VEIN WITH AUTOLOGOUS ARTERIAL TISSUE, OPEN APPROACH: ICD-10-PCS | Performed by: SURGERY

## 2018-04-09 PROCEDURE — 83735 ASSAY OF MAGNESIUM: CPT

## 2018-04-09 PROCEDURE — 94761 N-INVAS EAR/PLS OXIMETRY MLT: CPT

## 2018-04-09 PROCEDURE — 88300 SURGICAL PATH GROSS: CPT | Mod: 26,,, | Performed by: PATHOLOGY

## 2018-04-09 PROCEDURE — 27201037 HC PRESSURE MONITORING SET UP

## 2018-04-09 PROCEDURE — 63600175 PHARM REV CODE 636 W HCPCS: Performed by: STUDENT IN AN ORGANIZED HEALTH CARE EDUCATION/TRAINING PROGRAM

## 2018-04-09 PROCEDURE — 85610 PROTHROMBIN TIME: CPT

## 2018-04-09 PROCEDURE — 85025 COMPLETE CBC W/AUTO DIFF WBC: CPT

## 2018-04-09 PROCEDURE — 37000008 HC ANESTHESIA 1ST 15 MINUTES: Performed by: SURGERY

## 2018-04-09 PROCEDURE — 37000009 HC ANESTHESIA EA ADD 15 MINS: Performed by: SURGERY

## 2018-04-09 PROCEDURE — 94799 UNLISTED PULMONARY SVC/PX: CPT

## 2018-04-09 PROCEDURE — 86920 COMPATIBILITY TEST SPIN: CPT

## 2018-04-09 PROCEDURE — 25000003 PHARM REV CODE 250: Performed by: STUDENT IN AN ORGANIZED HEALTH CARE EDUCATION/TRAINING PROGRAM

## 2018-04-09 PROCEDURE — 80053 COMPREHEN METABOLIC PANEL: CPT

## 2018-04-09 PROCEDURE — 27000221 HC OXYGEN, UP TO 24 HOURS

## 2018-04-09 PROCEDURE — 71000033 HC RECOVERY, INTIAL HOUR: Performed by: SURGERY

## 2018-04-09 PROCEDURE — 36620 INSERTION CATHETER ARTERY: CPT | Mod: 59,,, | Performed by: ANESTHESIOLOGY

## 2018-04-09 RX ORDER — HYDRALAZINE HYDROCHLORIDE 20 MG/ML
INJECTION INTRAMUSCULAR; INTRAVENOUS
Status: DISCONTINUED | OUTPATIENT
Start: 2018-04-09 | End: 2018-04-09

## 2018-04-09 RX ORDER — ONDANSETRON 2 MG/ML
INJECTION INTRAMUSCULAR; INTRAVENOUS
Status: DISCONTINUED | OUTPATIENT
Start: 2018-04-09 | End: 2018-04-09

## 2018-04-09 RX ORDER — HEPARIN SODIUM 1000 [USP'U]/ML
INJECTION, SOLUTION INTRAVENOUS; SUBCUTANEOUS
Status: DISCONTINUED | OUTPATIENT
Start: 2018-04-09 | End: 2018-04-09

## 2018-04-09 RX ORDER — ACETAMINOPHEN 10 MG/ML
INJECTION, SOLUTION INTRAVENOUS
Status: DISCONTINUED | OUTPATIENT
Start: 2018-04-09 | End: 2018-04-09

## 2018-04-09 RX ORDER — FENTANYL CITRATE 50 UG/ML
25 INJECTION, SOLUTION INTRAMUSCULAR; INTRAVENOUS EVERY 5 MIN PRN
Status: DISCONTINUED | OUTPATIENT
Start: 2018-04-09 | End: 2018-04-09 | Stop reason: HOSPADM

## 2018-04-09 RX ORDER — DOXYCYCLINE HYCLATE 100 MG
100 TABLET ORAL 2 TIMES DAILY
Status: COMPLETED | OUTPATIENT
Start: 2018-04-09 | End: 2018-04-12

## 2018-04-09 RX ORDER — NALOXONE HCL 0.4 MG/ML
0.02 VIAL (ML) INJECTION
Status: DISCONTINUED | OUTPATIENT
Start: 2018-04-09 | End: 2018-04-10

## 2018-04-09 RX ORDER — HYDROMORPHONE HCL IN 0.9% NACL 6 MG/30 ML
PATIENT CONTROLLED ANALGESIA SYRINGE INTRAVENOUS
Status: COMPLETED
Start: 2018-04-09 | End: 2018-04-09

## 2018-04-09 RX ORDER — CEFAZOLIN SODIUM 1 G/3ML
1 INJECTION, POWDER, FOR SOLUTION INTRAMUSCULAR; INTRAVENOUS
Status: COMPLETED | OUTPATIENT
Start: 2018-04-09 | End: 2018-04-10

## 2018-04-09 RX ORDER — LIDOCAINE HYDROCHLORIDE 10 MG/ML
1 INJECTION, SOLUTION EPIDURAL; INFILTRATION; INTRACAUDAL; PERINEURAL ONCE
Status: COMPLETED | OUTPATIENT
Start: 2018-04-09 | End: 2018-04-09

## 2018-04-09 RX ORDER — SODIUM CHLORIDE 9 MG/ML
INJECTION, SOLUTION INTRAVENOUS CONTINUOUS
Status: DISCONTINUED | OUTPATIENT
Start: 2018-04-09 | End: 2018-04-09

## 2018-04-09 RX ORDER — AMLODIPINE BESYLATE 5 MG/1
5 TABLET ORAL ONCE
Status: COMPLETED | OUTPATIENT
Start: 2018-04-09 | End: 2018-04-09

## 2018-04-09 RX ORDER — ONDANSETRON 2 MG/ML
4 INJECTION INTRAMUSCULAR; INTRAVENOUS ONCE AS NEEDED
Status: DISCONTINUED | OUTPATIENT
Start: 2018-04-09 | End: 2018-04-09 | Stop reason: HOSPADM

## 2018-04-09 RX ORDER — TAMSULOSIN HYDROCHLORIDE 0.4 MG/1
0.4 CAPSULE ORAL DAILY
Status: DISCONTINUED | OUTPATIENT
Start: 2018-04-09 | End: 2018-04-10

## 2018-04-09 RX ORDER — AMLODIPINE BESYLATE 5 MG/1
5 TABLET ORAL DAILY
Status: DISCONTINUED | OUTPATIENT
Start: 2018-04-10 | End: 2018-04-13 | Stop reason: HOSPADM

## 2018-04-09 RX ORDER — CLOPIDOGREL BISULFATE 75 MG/1
75 TABLET ORAL DAILY
Status: DISCONTINUED | OUTPATIENT
Start: 2018-04-10 | End: 2018-04-13 | Stop reason: HOSPADM

## 2018-04-09 RX ORDER — HYDRALAZINE HYDROCHLORIDE 20 MG/ML
10 INJECTION INTRAMUSCULAR; INTRAVENOUS ONCE
Status: COMPLETED | OUTPATIENT
Start: 2018-04-09 | End: 2018-04-09

## 2018-04-09 RX ORDER — SODIUM CHLORIDE 0.9 % (FLUSH) 0.9 %
3 SYRINGE (ML) INJECTION
Status: CANCELLED | OUTPATIENT
Start: 2018-04-09

## 2018-04-09 RX ORDER — HYDRALAZINE HYDROCHLORIDE 20 MG/ML
INJECTION INTRAMUSCULAR; INTRAVENOUS
Status: DISPENSED
Start: 2018-04-09 | End: 2018-04-10

## 2018-04-09 RX ORDER — MUPIROCIN 20 MG/G
OINTMENT TOPICAL
Status: DISCONTINUED | OUTPATIENT
Start: 2018-04-09 | End: 2018-04-09

## 2018-04-09 RX ORDER — HEPARIN SODIUM 1000 [USP'U]/ML
INJECTION, SOLUTION INTRAVENOUS; SUBCUTANEOUS
Status: DISCONTINUED | OUTPATIENT
Start: 2018-04-09 | End: 2018-04-09 | Stop reason: HOSPADM

## 2018-04-09 RX ORDER — HYDROMORPHONE HCL IN 0.9% NACL 6 MG/30 ML
PATIENT CONTROLLED ANALGESIA SYRINGE INTRAVENOUS CONTINUOUS
Status: DISCONTINUED | OUTPATIENT
Start: 2018-04-09 | End: 2018-04-10

## 2018-04-09 RX ORDER — SODIUM CHLORIDE 0.9 % (FLUSH) 0.9 %
3 SYRINGE (ML) INJECTION
Status: DISCONTINUED | OUTPATIENT
Start: 2018-04-09 | End: 2018-04-13 | Stop reason: HOSPADM

## 2018-04-09 RX ORDER — LIDOCAINE HCL/PF 100 MG/5ML
SYRINGE (ML) INTRAVENOUS
Status: DISCONTINUED | OUTPATIENT
Start: 2018-04-09 | End: 2018-04-09

## 2018-04-09 RX ORDER — MIDAZOLAM HYDROCHLORIDE 1 MG/ML
INJECTION, SOLUTION INTRAMUSCULAR; INTRAVENOUS
Status: DISCONTINUED | OUTPATIENT
Start: 2018-04-09 | End: 2018-04-09

## 2018-04-09 RX ORDER — GLYCOPYRROLATE 0.2 MG/ML
INJECTION INTRAMUSCULAR; INTRAVENOUS
Status: DISCONTINUED | OUTPATIENT
Start: 2018-04-09 | End: 2018-04-09

## 2018-04-09 RX ORDER — NEOSTIGMINE METHYLSULFATE 1 MG/ML
INJECTION, SOLUTION INTRAVENOUS
Status: DISCONTINUED | OUTPATIENT
Start: 2018-04-09 | End: 2018-04-09

## 2018-04-09 RX ORDER — FERROUS SULFATE 325(65) MG
325 TABLET, DELAYED RELEASE (ENTERIC COATED) ORAL DAILY
Status: DISCONTINUED | OUTPATIENT
Start: 2018-04-10 | End: 2018-04-13 | Stop reason: HOSPADM

## 2018-04-09 RX ORDER — PROPOFOL 10 MG/ML
VIAL (ML) INTRAVENOUS
Status: DISCONTINUED | OUTPATIENT
Start: 2018-04-09 | End: 2018-04-09

## 2018-04-09 RX ORDER — LABETALOL HYDROCHLORIDE 5 MG/ML
10 INJECTION, SOLUTION INTRAVENOUS EVERY 4 HOURS PRN
Status: DISCONTINUED | OUTPATIENT
Start: 2018-04-09 | End: 2018-04-13 | Stop reason: HOSPADM

## 2018-04-09 RX ORDER — KETAMINE HCL IN 0.9 % NACL 50 MG/5 ML
SYRINGE (ML) INTRAVENOUS
Status: DISCONTINUED | OUTPATIENT
Start: 2018-04-09 | End: 2018-04-09

## 2018-04-09 RX ORDER — DIPHENHYDRAMINE HYDROCHLORIDE 50 MG/ML
INJECTION INTRAMUSCULAR; INTRAVENOUS
Status: DISCONTINUED | OUTPATIENT
Start: 2018-04-09 | End: 2018-04-09

## 2018-04-09 RX ORDER — SODIUM CHLORIDE, SODIUM LACTATE, POTASSIUM CHLORIDE, CALCIUM CHLORIDE 600; 310; 30; 20 MG/100ML; MG/100ML; MG/100ML; MG/100ML
INJECTION, SOLUTION INTRAVENOUS CONTINUOUS
Status: DISCONTINUED | OUTPATIENT
Start: 2018-04-09 | End: 2018-04-10

## 2018-04-09 RX ORDER — LIDOCAINE HYDROCHLORIDE 10 MG/ML
1 INJECTION, SOLUTION EPIDURAL; INFILTRATION; INTRACAUDAL; PERINEURAL ONCE
Status: DISCONTINUED | OUTPATIENT
Start: 2018-04-09 | End: 2018-04-09

## 2018-04-09 RX ORDER — NICARDIPINE HYDROCHLORIDE 0.2 MG/ML
1 INJECTION INTRAVENOUS CONTINUOUS
Status: DISCONTINUED | OUTPATIENT
Start: 2018-04-09 | End: 2018-04-09

## 2018-04-09 RX ORDER — PHENYLEPHRINE HYDROCHLORIDE 10 MG/ML
INJECTION INTRAVENOUS
Status: DISCONTINUED | OUTPATIENT
Start: 2018-04-09 | End: 2018-04-09

## 2018-04-09 RX ORDER — FENTANYL CITRATE 50 UG/ML
INJECTION, SOLUTION INTRAMUSCULAR; INTRAVENOUS
Status: DISCONTINUED | OUTPATIENT
Start: 2018-04-09 | End: 2018-04-09

## 2018-04-09 RX ORDER — ROSUVASTATIN CALCIUM 20 MG/1
20 TABLET, COATED ORAL DAILY
Status: DISCONTINUED | OUTPATIENT
Start: 2018-04-10 | End: 2018-04-13 | Stop reason: HOSPADM

## 2018-04-09 RX ORDER — LABETALOL HYDROCHLORIDE 5 MG/ML
INJECTION, SOLUTION INTRAVENOUS
Status: DISCONTINUED | OUTPATIENT
Start: 2018-04-09 | End: 2018-04-09

## 2018-04-09 RX ORDER — VALSARTAN 160 MG/1
160 TABLET ORAL 2 TIMES DAILY
Status: DISCONTINUED | OUTPATIENT
Start: 2018-04-09 | End: 2018-04-13 | Stop reason: HOSPADM

## 2018-04-09 RX ORDER — DIPHENHYDRAMINE HYDROCHLORIDE 50 MG/ML
25 INJECTION INTRAMUSCULAR; INTRAVENOUS EVERY 6 HOURS PRN
Status: DISCONTINUED | OUTPATIENT
Start: 2018-04-09 | End: 2018-04-09 | Stop reason: HOSPADM

## 2018-04-09 RX ORDER — FENTANYL CITRATE 50 UG/ML
25 INJECTION, SOLUTION INTRAMUSCULAR; INTRAVENOUS EVERY 5 MIN PRN
Status: CANCELLED | OUTPATIENT
Start: 2018-04-09

## 2018-04-09 RX ORDER — CEFAZOLIN SODIUM 1 G/3ML
2 INJECTION, POWDER, FOR SOLUTION INTRAMUSCULAR; INTRAVENOUS
Status: COMPLETED | OUTPATIENT
Start: 2018-04-09 | End: 2018-04-09

## 2018-04-09 RX ORDER — IODIXANOL 320 MG/ML
INJECTION, SOLUTION INTRAVASCULAR
Status: DISCONTINUED | OUTPATIENT
Start: 2018-04-09 | End: 2018-04-09 | Stop reason: HOSPADM

## 2018-04-09 RX ORDER — AMOXICILLIN 250 MG
1 CAPSULE ORAL 2 TIMES DAILY
Status: DISCONTINUED | OUTPATIENT
Start: 2018-04-09 | End: 2018-04-13 | Stop reason: HOSPADM

## 2018-04-09 RX ORDER — AMLODIPINE BESYLATE 5 MG/1
TABLET ORAL
Status: DISPENSED
Start: 2018-04-09 | End: 2018-04-10

## 2018-04-09 RX ORDER — ROCURONIUM BROMIDE 10 MG/ML
INJECTION, SOLUTION INTRAVENOUS
Status: DISCONTINUED | OUTPATIENT
Start: 2018-04-09 | End: 2018-04-09

## 2018-04-09 RX ORDER — PROTAMINE SULFATE 10 MG/ML
INJECTION, SOLUTION INTRAVENOUS
Status: DISCONTINUED | OUTPATIENT
Start: 2018-04-09 | End: 2018-04-09

## 2018-04-09 RX ADMIN — Medication: at 04:04

## 2018-04-09 RX ADMIN — FENTANYL CITRATE 50 MCG: 50 INJECTION, SOLUTION INTRAMUSCULAR; INTRAVENOUS at 11:04

## 2018-04-09 RX ADMIN — CEFAZOLIN 1 G: 330 INJECTION, POWDER, FOR SOLUTION INTRAMUSCULAR; INTRAVENOUS at 11:04

## 2018-04-09 RX ADMIN — HEPARIN SODIUM 1000 UNITS: 1000 INJECTION, SOLUTION INTRAVENOUS; SUBCUTANEOUS at 01:04

## 2018-04-09 RX ADMIN — PROTAMINE SULFATE 10 MG: 10 INJECTION, SOLUTION INTRAVENOUS at 02:04

## 2018-04-09 RX ADMIN — NICARDIPINE HYDROCHLORIDE 1 MG/HR: 0.2 INJECTION, SOLUTION INTRAVENOUS at 05:04

## 2018-04-09 RX ADMIN — TAMSULOSIN HYDROCHLORIDE 0.4 MG: 0.4 CAPSULE ORAL at 04:04

## 2018-04-09 RX ADMIN — NEOSTIGMINE METHYLSULFATE 3 MG: 1 INJECTION INTRAVENOUS at 03:04

## 2018-04-09 RX ADMIN — DIPHENHYDRAMINE HYDROCHLORIDE 25 MG: 50 INJECTION, SOLUTION INTRAMUSCULAR; INTRAVENOUS at 02:04

## 2018-04-09 RX ADMIN — GLYCOPYRROLATE 0.4 MG: 0.2 INJECTION, SOLUTION INTRAMUSCULAR; INTRAVENOUS at 03:04

## 2018-04-09 RX ADMIN — VALSARTAN 160 MG: 160 TABLET, FILM COATED ORAL at 09:04

## 2018-04-09 RX ADMIN — SODIUM CHLORIDE, SODIUM GLUCONATE, SODIUM ACETATE, POTASSIUM CHLORIDE, MAGNESIUM CHLORIDE, SODIUM PHOSPHATE, DIBASIC, AND POTASSIUM PHOSPHATE: .53; .5; .37; .037; .03; .012; .00082 INJECTION, SOLUTION INTRAVENOUS at 10:04

## 2018-04-09 RX ADMIN — PHENYLEPHRINE HYDROCHLORIDE 100 MCG: 10 INJECTION INTRAVENOUS at 01:04

## 2018-04-09 RX ADMIN — ACETAMINOPHEN 1000 MG: 10 INJECTION, SOLUTION INTRAVENOUS at 12:04

## 2018-04-09 RX ADMIN — PROPOFOL 100 MG: 10 INJECTION, EMULSION INTRAVENOUS at 10:04

## 2018-04-09 RX ADMIN — ROCURONIUM BROMIDE 10 MG: 10 INJECTION, SOLUTION INTRAVENOUS at 12:04

## 2018-04-09 RX ADMIN — PHENYLEPHRINE HYDROCHLORIDE 100 MCG: 10 INJECTION INTRAVENOUS at 10:04

## 2018-04-09 RX ADMIN — Medication 20 MG: at 11:04

## 2018-04-09 RX ADMIN — HYDROCORTISONE SODIUM SUCCINATE 100 MG: 100 INJECTION, POWDER, FOR SOLUTION INTRAMUSCULAR; INTRAVENOUS at 01:04

## 2018-04-09 RX ADMIN — ROCURONIUM BROMIDE 10 MG: 10 INJECTION, SOLUTION INTRAVENOUS at 01:04

## 2018-04-09 RX ADMIN — GLYCOPYRROLATE 0.2 MG: 0.2 INJECTION, SOLUTION INTRAMUSCULAR; INTRAVENOUS at 11:04

## 2018-04-09 RX ADMIN — SODIUM CHLORIDE: 0.9 INJECTION, SOLUTION INTRAVENOUS at 08:04

## 2018-04-09 RX ADMIN — PHENYLEPHRINE HYDROCHLORIDE 100 MCG: 10 INJECTION INTRAVENOUS at 12:04

## 2018-04-09 RX ADMIN — LIDOCAINE HYDROCHLORIDE 100 MG: 20 INJECTION, SOLUTION INTRAVENOUS at 10:04

## 2018-04-09 RX ADMIN — SODIUM CHLORIDE, POTASSIUM CHLORIDE, SODIUM LACTATE AND CALCIUM CHLORIDE: 600; 310; 30; 20 INJECTION, SOLUTION INTRAVENOUS at 04:04

## 2018-04-09 RX ADMIN — PHENYLEPHRINE HYDROCHLORIDE 100 MCG: 10 INJECTION INTRAVENOUS at 11:04

## 2018-04-09 RX ADMIN — HEPARIN SODIUM 2000 UNITS: 1000 INJECTION, SOLUTION INTRAVENOUS; SUBCUTANEOUS at 01:04

## 2018-04-09 RX ADMIN — FENTANYL CITRATE 50 MCG: 50 INJECTION, SOLUTION INTRAMUSCULAR; INTRAVENOUS at 12:04

## 2018-04-09 RX ADMIN — PROTAMINE SULFATE 5 MG: 10 INJECTION, SOLUTION INTRAVENOUS at 02:04

## 2018-04-09 RX ADMIN — LIDOCAINE HYDROCHLORIDE 10 MG: 10 INJECTION, SOLUTION EPIDURAL; INFILTRATION; INTRACAUDAL; PERINEURAL at 08:04

## 2018-04-09 RX ADMIN — SODIUM CHLORIDE 0.25 MCG/KG/MIN: 9 INJECTION, SOLUTION INTRAVENOUS at 01:04

## 2018-04-09 RX ADMIN — FENTANYL CITRATE 50 MCG: 50 INJECTION, SOLUTION INTRAMUSCULAR; INTRAVENOUS at 02:04

## 2018-04-09 RX ADMIN — AMLODIPINE BESYLATE 5 MG: 5 TABLET ORAL at 04:04

## 2018-04-09 RX ADMIN — PHENYLEPHRINE HYDROCHLORIDE 100 MCG: 10 INJECTION INTRAVENOUS at 03:04

## 2018-04-09 RX ADMIN — FENTANYL CITRATE 75 MCG: 50 INJECTION, SOLUTION INTRAMUSCULAR; INTRAVENOUS at 10:04

## 2018-04-09 RX ADMIN — HYDRALAZINE HYDROCHLORIDE 10 MG: 20 INJECTION INTRAMUSCULAR; INTRAVENOUS at 04:04

## 2018-04-09 RX ADMIN — HYDRALAZINE HYDROCHLORIDE 10 MG: 20 INJECTION INTRAMUSCULAR; INTRAVENOUS at 03:04

## 2018-04-09 RX ADMIN — HYDRALAZINE HYDROCHLORIDE 10 MG: 20 INJECTION INTRAMUSCULAR; INTRAVENOUS at 02:04

## 2018-04-09 RX ADMIN — Medication 10 MG: at 12:04

## 2018-04-09 RX ADMIN — MUPIROCIN: 20 OINTMENT TOPICAL at 08:04

## 2018-04-09 RX ADMIN — CEFAZOLIN 2 G: 330 INJECTION, POWDER, FOR SOLUTION INTRAMUSCULAR; INTRAVENOUS at 03:04

## 2018-04-09 RX ADMIN — CEFAZOLIN 2 G: 330 INJECTION, POWDER, FOR SOLUTION INTRAMUSCULAR; INTRAVENOUS at 11:04

## 2018-04-09 RX ADMIN — STANDARDIZED SENNA CONCENTRATE AND DOCUSATE SODIUM 1 TABLET: 8.6; 5 TABLET, FILM COATED ORAL at 08:04

## 2018-04-09 RX ADMIN — LABETALOL HYDROCHLORIDE 10 MG: 5 INJECTION, SOLUTION INTRAVENOUS at 02:04

## 2018-04-09 RX ADMIN — HEPARIN SODIUM 5000 UNITS: 1000 INJECTION, SOLUTION INTRAVENOUS; SUBCUTANEOUS at 01:04

## 2018-04-09 RX ADMIN — SODIUM CHLORIDE, SODIUM GLUCONATE, SODIUM ACETATE, POTASSIUM CHLORIDE, MAGNESIUM CHLORIDE, SODIUM PHOSPHATE, DIBASIC, AND POTASSIUM PHOSPHATE: .53; .5; .37; .037; .03; .012; .00082 INJECTION, SOLUTION INTRAVENOUS at 12:04

## 2018-04-09 RX ADMIN — ONDANSETRON 4 MG: 2 INJECTION INTRAMUSCULAR; INTRAVENOUS at 02:04

## 2018-04-09 RX ADMIN — LABETALOL HYDROCHLORIDE 10 MG: 5 INJECTION, SOLUTION INTRAVENOUS at 07:04

## 2018-04-09 RX ADMIN — DOXYCYCLINE HYCLATE 100 MG: 100 TABLET, COATED ORAL at 08:04

## 2018-04-09 RX ADMIN — MIDAZOLAM HYDROCHLORIDE 1 MG: 1 INJECTION, SOLUTION INTRAMUSCULAR; INTRAVENOUS at 10:04

## 2018-04-09 RX ADMIN — ROCURONIUM BROMIDE 50 MG: 10 INJECTION, SOLUTION INTRAVENOUS at 10:04

## 2018-04-09 RX ADMIN — FENTANYL CITRATE 75 MCG: 50 INJECTION, SOLUTION INTRAMUSCULAR; INTRAVENOUS at 12:04

## 2018-04-09 RX ADMIN — Medication 20 MG: at 12:04

## 2018-04-09 NOTE — BRIEF OP NOTE
[facility]  Brief Operative Note    SUMMARY     Surgery Date: 4/9/2018     Surgeon(s) and Role:     * Marilee Waters MD - Fellow     * Amari Valdez MD - Resident - Assisting     * BARTOLOME Mahmood III, MD - Primary        Pre-op Diagnosis:  PVD (peripheral vascular disease) with ischemic rest pain    Post-op Diagnosis:   Same    PROCEDURES:    1. L Distal SFA - Post tibial bypass with non-reversed saphenous vein  2. Completion angiogram    Anesthesia: General    Description of Procedure: good conduit, excellent biphasic PT signal after with marked augmentation with bypass;  Angio- no distal stenosis    Description of the findings of the procedure: tunneled anatomically    Estimated Blood Loss: 50cc         Specimens:   Specimen (12h ago through future)    None

## 2018-04-09 NOTE — NURSING TRANSFER
Nursing Transfer Note      4/9/2018     Transfer 6    Transfer via bed    Transfer with n/a    Transported by Rn    Medicines sent: pca dilaudid    Chart send with patient: yes    Notified: friend    Patient reassessed at: 04/9/18    Upon arrival to floor:

## 2018-04-09 NOTE — TRANSFER OF CARE
"Anesthesia Transfer of Care Note    Patient: Ayaan Ricks    Procedure(s) Performed: Procedure(s) (LRB):  [id number] (Left)    Patient location: PACU    Anesthesia Type: general    Transport from OR: Transported from OR on 6-10 L/min O2 by face mask with adequate spontaneous ventilation    Post pain: adequate analgesia    Post assessment: no apparent anesthetic complications    Post vital signs: stable    Level of consciousness: awake    Nausea/Vomiting: no nausea/vomiting    Complications: none    Transfer of care protocol was followed      Last vitals:   Visit Vitals  BP (!) 189/103 (BP Location: Right arm, Patient Position: Lying)   Pulse 70   Temp 36.3 °C (97.3 °F) (Temporal)   Resp 17   Ht 5' 10" (1.778 m)   Wt 54.4 kg (120 lb)   SpO2 100%   BMI 17.22 kg/m²     "

## 2018-04-09 NOTE — ANESTHESIA PROCEDURE NOTES
Arterial    Diagnosis: peripheral arterial disease    Patient location during procedure: done in OR  Procedure start time: 4/9/2018 10:35 AM  Timeout: 4/9/2018 10:35 AM  Procedure end time: 4/9/2018 10:38 AM  Staffing  Anesthesiologist: SHARONA MATTHEWS  Resident/CRNA: CHICA RAY  Performed: resident/CRNA   Anesthesiologist was present at the time of the procedure.  Preanesthetic Checklist  Completed: patient identified, site marked, surgical consent, pre-op evaluation, timeout performed, IV checked, risks and benefits discussed, monitors and equipment checked and anesthesia consent givenArterial  Skin Prep: alcohol swabs  Local Infiltration: none  Orientation: left  Location: radial  Catheter Size: 20 G  Catheter placement by Anatomical landmarks. Heme positive aspiration all ports.Insertion Attempts: 1  Assessment  Dressing: secured with tape and tegaderm  Patient: Tolerated well

## 2018-04-09 NOTE — INTERVAL H&P NOTE
The patient has been examined and the H&P has been reviewed:    I concur with the findings and changes have been noted since the H&P was written: patient underwent LLE angiogram on 3/29 demonstrating long segment popliteal occlusion, was enrolled in BEST trial and randomized to surgical arm- here for femoral to PT bypass. Of note, had purulence from R femoral access site, underwent I+D on 4/4/18.    Anesthesia/Surgery risks, benefits and alternative options discussed and understood by patient/family.          Active Hospital Problems    Diagnosis  POA    PVD (peripheral vascular disease) with claudication [I73.9]  Yes      Resolved Hospital Problems    Diagnosis Date Resolved POA   No resolved problems to display.

## 2018-04-10 LAB
ALBUMIN SERPL BCP-MCNC: 2.8 G/DL
ALBUMIN SERPL BCP-MCNC: 2.8 G/DL
ALP SERPL-CCNC: 91 U/L
ALP SERPL-CCNC: 91 U/L
ALT SERPL W/O P-5'-P-CCNC: 10 U/L
ALT SERPL W/O P-5'-P-CCNC: 10 U/L
ANION GAP SERPL CALC-SCNC: 7 MMOL/L
ANION GAP SERPL CALC-SCNC: 7 MMOL/L
AST SERPL-CCNC: 17 U/L
AST SERPL-CCNC: 17 U/L
BASOPHILS # BLD AUTO: 0.01 K/UL
BASOPHILS # BLD AUTO: 0.01 K/UL
BASOPHILS NFR BLD: 0.1 %
BASOPHILS NFR BLD: 0.1 %
BILIRUB SERPL-MCNC: 0.3 MG/DL
BILIRUB SERPL-MCNC: 0.3 MG/DL
BILIRUB UR QL STRIP: NEGATIVE
BUN SERPL-MCNC: 22 MG/DL
BUN SERPL-MCNC: 22 MG/DL
CALCIUM SERPL-MCNC: 8.5 MG/DL
CALCIUM SERPL-MCNC: 8.5 MG/DL
CHLORIDE SERPL-SCNC: 104 MMOL/L
CHLORIDE SERPL-SCNC: 104 MMOL/L
CLARITY UR REFRACT.AUTO: CLEAR
CO2 SERPL-SCNC: 24 MMOL/L
CO2 SERPL-SCNC: 24 MMOL/L
COLOR UR AUTO: YELLOW
CREAT SERPL-MCNC: 1.2 MG/DL
CREAT SERPL-MCNC: 1.2 MG/DL
DIFFERENTIAL METHOD: ABNORMAL
DIFFERENTIAL METHOD: ABNORMAL
EOSINOPHIL # BLD AUTO: 0 K/UL
EOSINOPHIL # BLD AUTO: 0 K/UL
EOSINOPHIL NFR BLD: 0 %
EOSINOPHIL NFR BLD: 0 %
ERYTHROCYTE [DISTWIDTH] IN BLOOD BY AUTOMATED COUNT: 15.3 %
ERYTHROCYTE [DISTWIDTH] IN BLOOD BY AUTOMATED COUNT: 15.3 %
EST. GFR  (AFRICAN AMERICAN): >60 ML/MIN/1.73 M^2
EST. GFR  (AFRICAN AMERICAN): >60 ML/MIN/1.73 M^2
EST. GFR  (NON AFRICAN AMERICAN): 59.6 ML/MIN/1.73 M^2
EST. GFR  (NON AFRICAN AMERICAN): 59.6 ML/MIN/1.73 M^2
GLUCOSE SERPL-MCNC: 105 MG/DL
GLUCOSE SERPL-MCNC: 105 MG/DL
GLUCOSE UR QL STRIP: NEGATIVE
HCT VFR BLD AUTO: 25.7 %
HCT VFR BLD AUTO: 25.7 %
HGB BLD-MCNC: 8.6 G/DL
HGB BLD-MCNC: 8.6 G/DL
HGB UR QL STRIP: ABNORMAL
IMM GRANULOCYTES # BLD AUTO: 0.03 K/UL
IMM GRANULOCYTES # BLD AUTO: 0.03 K/UL
IMM GRANULOCYTES NFR BLD AUTO: 0.3 %
IMM GRANULOCYTES NFR BLD AUTO: 0.3 %
KETONES UR QL STRIP: NEGATIVE
LEUKOCYTE ESTERASE UR QL STRIP: NEGATIVE
LYMPHOCYTES # BLD AUTO: 1.4 K/UL
LYMPHOCYTES # BLD AUTO: 1.4 K/UL
LYMPHOCYTES NFR BLD: 14.6 %
LYMPHOCYTES NFR BLD: 14.6 %
MAGNESIUM SERPL-MCNC: 1.4 MG/DL
MAGNESIUM SERPL-MCNC: 1.4 MG/DL
MCH RBC QN AUTO: 30.6 PG
MCH RBC QN AUTO: 30.6 PG
MCHC RBC AUTO-ENTMCNC: 33.5 G/DL
MCHC RBC AUTO-ENTMCNC: 33.5 G/DL
MCV RBC AUTO: 92 FL
MCV RBC AUTO: 92 FL
MICROSCOPIC COMMENT: ABNORMAL
MONOCYTES # BLD AUTO: 0.7 K/UL
MONOCYTES # BLD AUTO: 0.7 K/UL
MONOCYTES NFR BLD: 7.5 %
MONOCYTES NFR BLD: 7.5 %
NEUTROPHILS # BLD AUTO: 7.2 K/UL
NEUTROPHILS # BLD AUTO: 7.2 K/UL
NEUTROPHILS NFR BLD: 77.5 %
NEUTROPHILS NFR BLD: 77.5 %
NITRITE UR QL STRIP: NEGATIVE
NRBC BLD-RTO: 0 /100 WBC
NRBC BLD-RTO: 0 /100 WBC
PH UR STRIP: 7 [PH] (ref 5–8)
PHOSPHATE SERPL-MCNC: 4.4 MG/DL
PHOSPHATE SERPL-MCNC: 4.4 MG/DL
PLATELET # BLD AUTO: 227 K/UL
PLATELET # BLD AUTO: 227 K/UL
PMV BLD AUTO: 9.1 FL
PMV BLD AUTO: 9.1 FL
POTASSIUM SERPL-SCNC: 4.3 MMOL/L
POTASSIUM SERPL-SCNC: 4.3 MMOL/L
PROT SERPL-MCNC: 6.2 G/DL
PROT SERPL-MCNC: 6.2 G/DL
PROT UR QL STRIP: NEGATIVE
RBC # BLD AUTO: 2.81 M/UL
RBC # BLD AUTO: 2.81 M/UL
RBC #/AREA URNS AUTO: 91 /HPF (ref 0–4)
SODIUM SERPL-SCNC: 135 MMOL/L
SODIUM SERPL-SCNC: 135 MMOL/L
SP GR UR STRIP: 1.01 (ref 1–1.03)
URN SPEC COLLECT METH UR: ABNORMAL
UROBILINOGEN UR STRIP-ACNC: NEGATIVE EU/DL
WBC # BLD AUTO: 9.23 K/UL
WBC # BLD AUTO: 9.23 K/UL
WBC #/AREA URNS AUTO: 2 /HPF (ref 0–5)

## 2018-04-10 PROCEDURE — 81001 URINALYSIS AUTO W/SCOPE: CPT

## 2018-04-10 PROCEDURE — 97165 OT EVAL LOW COMPLEX 30 MIN: CPT

## 2018-04-10 PROCEDURE — 97161 PT EVAL LOW COMPLEX 20 MIN: CPT

## 2018-04-10 PROCEDURE — 25000003 PHARM REV CODE 250: Performed by: SURGERY

## 2018-04-10 PROCEDURE — 94761 N-INVAS EAR/PLS OXIMETRY MLT: CPT

## 2018-04-10 PROCEDURE — 85025 COMPLETE CBC W/AUTO DIFF WBC: CPT

## 2018-04-10 PROCEDURE — 36415 COLL VENOUS BLD VENIPUNCTURE: CPT

## 2018-04-10 PROCEDURE — 83735 ASSAY OF MAGNESIUM: CPT

## 2018-04-10 PROCEDURE — 63600175 PHARM REV CODE 636 W HCPCS: Performed by: SURGERY

## 2018-04-10 PROCEDURE — 80053 COMPREHEN METABOLIC PANEL: CPT

## 2018-04-10 PROCEDURE — 25000003 PHARM REV CODE 250: Performed by: STUDENT IN AN ORGANIZED HEALTH CARE EDUCATION/TRAINING PROGRAM

## 2018-04-10 PROCEDURE — 84100 ASSAY OF PHOSPHORUS: CPT

## 2018-04-10 PROCEDURE — 20600001 HC STEP DOWN PRIVATE ROOM

## 2018-04-10 RX ORDER — FENTANYL CITRATE 50 UG/ML
25 INJECTION, SOLUTION INTRAMUSCULAR; INTRAVENOUS
Status: DISCONTINUED | OUTPATIENT
Start: 2018-04-10 | End: 2018-04-13 | Stop reason: HOSPADM

## 2018-04-10 RX ORDER — TAMSULOSIN HYDROCHLORIDE 0.4 MG/1
0.8 CAPSULE ORAL DAILY
Status: DISCONTINUED | OUTPATIENT
Start: 2018-04-11 | End: 2018-04-13 | Stop reason: HOSPADM

## 2018-04-10 RX ORDER — HYDROCODONE BITARTRATE AND ACETAMINOPHEN 7.5; 325 MG/1; MG/1
1 TABLET ORAL EVERY 4 HOURS PRN
Status: DISCONTINUED | OUTPATIENT
Start: 2018-04-10 | End: 2018-04-13 | Stop reason: HOSPADM

## 2018-04-10 RX ORDER — OXYCODONE HYDROCHLORIDE 5 MG/1
5 TABLET ORAL EVERY 6 HOURS PRN
Status: DISCONTINUED | OUTPATIENT
Start: 2018-04-10 | End: 2018-04-13 | Stop reason: HOSPADM

## 2018-04-10 RX ORDER — LANOLIN ALCOHOL/MO/W.PET/CERES
800 CREAM (GRAM) TOPICAL 2 TIMES DAILY
Status: COMPLETED | OUTPATIENT
Start: 2018-04-10 | End: 2018-04-10

## 2018-04-10 RX ADMIN — CEFAZOLIN 1 G: 330 INJECTION, POWDER, FOR SOLUTION INTRAMUSCULAR; INTRAVENOUS at 03:04

## 2018-04-10 RX ADMIN — MAGNESIUM OXIDE TAB 400 MG (241.3 MG ELEMENTAL MG) 800 MG: 400 (241.3 MG) TAB at 08:04

## 2018-04-10 RX ADMIN — OXYCODONE HYDROCHLORIDE 5 MG: 5 TABLET ORAL at 07:04

## 2018-04-10 RX ADMIN — VALSARTAN 160 MG: 160 TABLET, FILM COATED ORAL at 08:04

## 2018-04-10 RX ADMIN — DOXYCYCLINE HYCLATE 100 MG: 100 TABLET, COATED ORAL at 08:04

## 2018-04-10 RX ADMIN — CLOPIDOGREL 75 MG: 75 TABLET, FILM COATED ORAL at 08:04

## 2018-04-10 RX ADMIN — STANDARDIZED SENNA CONCENTRATE AND DOCUSATE SODIUM 1 TABLET: 8.6; 5 TABLET, FILM COATED ORAL at 08:04

## 2018-04-10 RX ADMIN — CEFAZOLIN 1 G: 330 INJECTION, POWDER, FOR SOLUTION INTRAMUSCULAR; INTRAVENOUS at 08:04

## 2018-04-10 RX ADMIN — OXYCODONE HYDROCHLORIDE 5 MG: 5 TABLET ORAL at 01:04

## 2018-04-10 RX ADMIN — ROSUVASTATIN CALCIUM 20 MG: 20 TABLET, FILM COATED ORAL at 08:04

## 2018-04-10 RX ADMIN — HYDROCODONE BITARTRATE AND ACETAMINOPHEN 1 TABLET: 7.5; 325 TABLET ORAL at 10:04

## 2018-04-10 RX ADMIN — OXYCODONE HYDROCHLORIDE 5 MG: 5 TABLET ORAL at 08:04

## 2018-04-10 RX ADMIN — AMLODIPINE BESYLATE 5 MG: 5 TABLET ORAL at 08:04

## 2018-04-10 RX ADMIN — FERROUS SULFATE TAB EC 325 MG (65 MG FE EQUIVALENT) 325 MG: 325 (65 FE) TABLET DELAYED RESPONSE at 08:04

## 2018-04-10 RX ADMIN — SODIUM CHLORIDE, POTASSIUM CHLORIDE, SODIUM LACTATE AND CALCIUM CHLORIDE: 600; 310; 30; 20 INJECTION, SOLUTION INTRAVENOUS at 12:04

## 2018-04-10 RX ADMIN — TAMSULOSIN HYDROCHLORIDE 0.4 MG: 0.4 CAPSULE ORAL at 08:04

## 2018-04-10 RX ADMIN — HYDROCODONE BITARTRATE AND ACETAMINOPHEN 1 TABLET: 7.5; 325 TABLET ORAL at 04:04

## 2018-04-10 NOTE — PLAN OF CARE
Problem: Occupational Therapy Goal  Goal: Occupational Therapy Goal  Goals to be met by: 4/10/2018     Patient will increase functional independence with ADLs by performing:    UE Dressing with [place].  LE Dressing with Modified [place].  Grooming while standing with Modified [place].  Toileting from toilet with Modified [place] for hygiene and clothing management.   Bathing from  edge of bed with Minimal Assistance.  Supine to sit with Modified [place].  Stand pivot transfers with [place].  Toilet transfer to toilet with Modified [place].    Evaluation completed.  OT plan of care developed and reviewed with patient.     Recommend home with HHOT upon d/c.    CORTES Koo  4/10/2018  Rehab Services

## 2018-04-10 NOTE — PLAN OF CARE
Problem: Patient Care Overview  Goal: Plan of Care Review  Outcome: Ongoing (interventions implemented as appropriate)  Pt AOx4 and was injury free during night shift.  Left leg elevated during the night.  PCA, Art line, and Macias Dc'd in the morning no problems encountered.  Pain was controlled with the PCA during the night shift.  The left groin is soft with no visible drainage.  The left leg dressing is dry and intact with no visible discharge.
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
## 2018-04-10 NOTE — PLAN OF CARE
Problem: Patient Care Overview  Goal: Plan of Care Review   Pt tolerated treatment session fair today.  Pt performs supine to sitting at EOB with minimal A from PT. Pt sits at edge of bed for total duration of 10 min with stand by A from PT. Pt perform sit to supine with minimal A from PT.Pt session limited due to pt reports of left upper leg pain at surgical site location. Pt educated on importance of starting the mobility process and PT POC for walking outside of room next visit. Pt verbalizes understanding of PT POC.     Norm Gamboa, SPT  4/10/2018

## 2018-04-10 NOTE — PROGRESS NOTES
[facility]  Vascular Surgery  Progress Note    Patient Name: Ayaan Ricks  MRN: [id number]  Admission Date: 4/9/2018  Primary Care Provider: Primary Doctor No    Subjective:     Interval History: NAEON, pain well controlled, palpable left PT pulse, pain well controlled, tolerating diet    Post-Op Info:  Procedure(s) (LRB):  [id number] (Left)   1 Day Post-Op       Medications:  Continuous Infusions:  Scheduled Meds:   amLODIPine  5 mg Oral Daily    ceFAZolin (ANCEF) IVPB  1 g Intravenous Q8H    clopidogrel  75 mg Oral Daily    doxycycline  100 mg Oral BID    ferrous sulfate  325 mg Oral Daily    magnesium oxide  800 mg Oral BID    rosuvastatin  20 mg Oral Daily    senna-docusate 8.6-50 mg  1 tablet Oral BID    tamsulosin  0.4 mg Oral Daily    valsartan  160 mg Oral BID     PRN Meds:fentaNYL, hydrocodone-acetaminophen 7.5-325mg, labetalol, oxyCODONE, sodium chloride 0.9%, sodium chloride 0.9%     Objective:     Vital Signs (Most Recent):  Temp: 98.2 °F (36.8 °C) (04/10/18 0728)  Pulse: 75 (04/10/18 0728)  Resp: 20 (04/10/18 0728)  BP: (!) 106/57 (04/10/18 0728)  SpO2: 98 % (04/10/18 0728) Vital Signs (24h Range):  Temp:  [97.1 °F (36.2 °C)-99.1 °F (37.3 °C)] 98.2 °F (36.8 °C)  Pulse:  [63-94] 75  Resp:  [10-88] 20  SpO2:  [95 %-100 %] 98 %  BP: (106-189)/() 106/57  Arterial Line BP: (189-206)/(58-76) 198/61          Physical Exam   Constitutional: He is oriented to person, place, and time. He appears well-developed and well-nourished. No distress.   HENT:   Head: Normocephalic and atraumatic.   Eyes: EOM are normal. Pupils are equal, round, and reactive to light.   Neck: Normal range of motion. Neck supple.   Cardiovascular: Normal rate and regular rhythm.    Palpable 2+ left PT  No hematomas appreciated at incisions   Pulmonary/Chest: Effort normal. No respiratory distress.   Abdominal: Soft. He exhibits no distension.   Musculoskeletal: Normal range of motion.    Neurological: He is alert and oriented to person, place, and time.   Skin: Skin is warm and dry. He is not diaphoretic.   Psychiatric: He has a normal mood and affect. His behavior is normal. Judgment and thought content normal.   Nursing note and vitals reviewed.      Significant Labs:  CBC:   Recent Labs  Lab 04/10/18  0430   WBC 9.23  9.23   RBC 2.81*  2.81*   HGB 8.6*  8.6*   HCT 25.7*  25.7*     227   MCV 92  92   MCH 30.6  30.6   MCHC 33.5  33.5     CMP:   Recent Labs  Lab 04/10/18  0430     105   CALCIUM 8.5*  8.5*   ALBUMIN 2.8*  2.8*   PROT 6.2  6.2   *  135*   K 4.3  4.3   CO2 24  24     104   BUN 22  22   CREATININE 1.2  1.2   ALKPHOS 91  91   ALT 10  10   AST 17  17   BILITOT 0.3  0.3       Significant Diagnostics:  I have reviewed all pertinent imaging results/findings within the past 24 hours.    Assessment/Plan:     PVD (peripheral vascular disease) with claudication    Patient is 74 yo male with PVD who underwent left SFA to PT bypass on 4/9/18.    - cardiac diet  - convert PCA to PO pain meds  - PT/OT   - d/c mIVF  - d/c khan  - d/c a-line  - ASA, plavix  - Home meds            Nolan Bose MD  Vascular Surgery  [facility]

## 2018-04-10 NOTE — NURSING TRANSFER
Nursing Transfer Note      4/9/2018     Transfer 607    Transfer via bed    Transfer with monitor    Transported by  Rn, PCT    Medicines sent: PCA,IVF    Chart send with patient: YES    Notified:FAMILY    Patient reassessed at: 2213, 04/09/2018

## 2018-04-10 NOTE — PLAN OF CARE
Takes plavix  No oxygen   no hh  No dme  PCP Seun  In [place]  Lives alone  ?ride    Update: spoke maria Perkins at [facility]  Pt will need PCP f/u and they will make it but BELLE is notify Paula when pt omar Kohli can be reached at [phone]  If hh needed, they work w [employer] most of the time.     04/10/18 0945   Discharge Assessment   Assessment Type Discharge Planning Assessment   Confirmed/corrected address and phone number on facesheet? Yes   Assessment information obtained from? Patient   Expected Length of Stay (days) 3   Communicated expected length of stay with patient/caregiver yes   Prior to hospitilization cognitive status: Alert/Oriented   Prior to hospitalization functional status: Independent   Current cognitive status: Alert/Oriented   Current Functional Status: Independent   Lives With alone   Able to Return to Prior Arrangements yes   Is patient able to care for self after discharge? Yes   Who are your caregiver(s) and their phone number(s)? sister allison   [phone]   Patient's perception of discharge disposition home or selfcare   Readmission Within The Last 30 Days no previous admission in last 30 days   Patient currently being followed by outpatient case management? No   Patient currently receives any other outside agency services? No   Equipment Currently Used at Home none   Do you have any problems affording any of your prescribed medications? No   Is the patient taking medications as prescribed? yes   Does the patient have transportation home? Yes   Does the patient receive services at the [facility]? No   Discharge Plan A Home with family   Discharge Plan B Home with family   Patient/Family In Agreement With Plan yes        04/10/18 0945   Discharge Assessment   Assessment Type Discharge Planning Assessment   Confirmed/corrected address and phone number on facesheet? Yes   Assessment information obtained from? Patient   Expected Length of Stay (days) 3   Communicated expected  length of stay with patient/caregiver yes   Prior to hospitilization cognitive status: Alert/Oriented   Prior to hospitalization functional status: Independent   Current cognitive status: Alert/Oriented   Current Functional Status: Independent   Lives With alone   Able to Return to Prior Arrangements yes   Is patient able to care for self after discharge? Yes   Who are your caregiver(s) and their phone number(s)? sister allison   [phone]   Patient's perception of discharge disposition home or selfcare   Readmission Within The Last 30 Days no previous admission in last 30 days   Patient currently being followed by outpatient case management? No   Patient currently receives any other outside agency services? No   Equipment Currently Used at Home none   Do you have any problems affording any of your prescribed medications? No   Is the patient taking medications as prescribed? yes   Does the patient have transportation home? Yes   Does the patient receive services at the [facility]? No   Discharge Plan A Home with family   Discharge Plan B Home with family   Patient/Family In Agreement With Plan yes

## 2018-04-10 NOTE — PLAN OF CARE
Problem: Patient Care Overview  Goal: Plan of Care Review  Outcome: Ongoing (interventions implemented as appropriate)  POC reviewed with pt,verbalized understanding. Pt  AAOx4, VSS. Pt OOB to chair with assist, ambulating with PT. Pt tolerating cardiac diet. Pt c/o left leg pain, PO pain medications given PRN. Neurovascular checks to left foot done q4h, strong pulses noted. Pt remains free of any falls/injury this shift. Call light within reach, will continue to monitor.

## 2018-04-10 NOTE — ANESTHESIA POSTPROCEDURE EVALUATION
"Anesthesia Post Evaluation    Patient: Ayaan Ricks    Procedure(s) Performed: Procedure(s) (LRB):  [id number] (Left)    Final Anesthesia Type: general  Patient location during evaluation: PACU  Patient participation: Yes- Able to Participate  Level of consciousness: awake and alert  Post-procedure vital signs: reviewed and stable  Pain management: adequate  Airway patency: patent  PONV status at discharge: No PONV  Anesthetic complications: no      Cardiovascular status: hemodynamically stable  Respiratory status: unassisted  Hydration status: euvolemic  Follow-up not needed.        Visit Vitals  /62 (BP Location: Right arm, Patient Position: Lying)   Pulse 72   Temp 36.9 °C (98.4 °F) (Oral)   Resp (!) 66   Ht 5' 10" (1.778 m)   Wt 54.4 kg (120 lb)   SpO2 97%   BMI 17.22 kg/m²       Pain/Alden Score: Pain Assessment Performed: Yes (4/9/2018 11:00 PM)  Presence of Pain: denies (4/9/2018 11:00 PM)  Pain Rating Prior to Med Admin: 8 (4/9/2018  4:20 PM)  Alden Score: 9 (4/9/2018 10:14 PM)      "

## 2018-04-10 NOTE — PLAN OF CARE
[facility]    HOME HEALTH ORDERS  FACE TO FACE ENCOUNTER    Patient Name: Ayaan PORTILLO Ricks  YOB: 1944    PCP: Primary Doctor No   PCP Address: None  PCP Phone Number: None  PCP Fax: None    Encounter Date: 04/10/2018    Admit to Home Health    Diagnoses:  Active Hospital Problems    Diagnosis  POA    *Atherosclerotic peripheral vascular disease with rest pain [I70.229]  Yes    Essential hypertension [I10]  Yes      Resolved Hospital Problems    Diagnosis Date Resolved POA   No resolved problems to display.       No future appointments.        I have seen and examined this patient face to face today. My clinical findings that support the need for the home health skilled services and home bound status are the following:  Requiring assistive device to leave home due to unsteady gait caused by  Surgery.    Allergies:  Review of patient's allergies indicates:   Allergen Reactions    Shellfish containing products Nausea And Vomiting     PT REPORTS THAT HE HAS NAUSEA AND VOMITING AFTER EATING SHELLFISH. DENIES SOB, FACIAL, MOUTH OR TONGUE SWELLING       Diet: cardiac diet    Activities: no heavy lifting for 4 weeks    Nursing:   SN to complete comprehensive assessment including routine vital signs. Instruct on disease process and s/s of complications to report to MD. Review/verify medication list sent home with the patient at time of discharge  and instruct patient/caregiver as needed. Frequency may be adjusted depending on start of care date.    Notify MD if SBP > 160 or < 90; DBP > 90 or < 50; HR > 120 or < 50; Temp > 101      CONSULTS:    Physical Therapy to evaluate and treat. Evaluate for home safety and equipment needs; Establish/upgrade home exercise program. Perform / instruct on therapeutic exercises, gait training, transfer training, and Range of Motion.  Occupational Therapy to evaluate and treat. Evaluate home environment for safety and equipment needs. Perform/Instruct  on transfers, ADL training, ROM, and therapeutic exercises.    MISCELLANEOUS CARE:  N/A    WOUND CARE ORDERS  n/a      Medications: Review discharge medications with patient and family and provide education.      Current Discharge Medication List      CONTINUE these medications which have NOT CHANGED    Details   amLODIPine (NORVASC) 5 MG tablet Take 5 mg by mouth once daily.      clopidogrel (PLAVIX) 75 mg tablet Take 75 mg by mouth once daily.      doxycycline (VIBRA-TABS) 100 MG tablet Take 1 tablet (100 mg total) by mouth 2 (two) times daily.  Qty: 14 tablet, Refills: 0      ferrous sulfate 324 mg (65 mg iron) TbEC Take 325 mg by mouth once daily.      !! hydrocodone-acetaminophen 5-325mg (NORCO) 5-325 mg per tablet Take 1 tablet by mouth every 6 (six) hours as needed for Pain.  Qty: 40 tablet, Refills: 0      rosuvastatin (CRESTOR) 20 MG tablet Take 20 mg by mouth once daily.      senna-docusate 8.6-50 mg (PERICOLACE) 8.6-50 mg per tablet Take 1 tablet by mouth 2 (two) times daily.      tamsulosin (FLOMAX) 0.4 mg Cp24 Take 0.4 mg by mouth once daily.      valsartan-hydrochlorothiazide (DIOVAN-HCT) 160-25 mg per tablet Take 1 tablet by mouth once daily.      !! hydrocodone-acetaminophen 5-325mg (NORCO) 5-325 mg per tablet Take 1 tablet by mouth every 6 (six) hours as needed for Pain.  Qty: 10 tablet, Refills: 0       !! - Potential duplicate medications found. Please discuss with provider.          I certify that this patient is confined to his home and needs physical therapy and occupational therapy.
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
## 2018-04-10 NOTE — SUBJECTIVE & OBJECTIVE
Medications:  Continuous Infusions:  Scheduled Meds:   amLODIPine  5 mg Oral Daily    ceFAZolin (ANCEF) IVPB  1 g Intravenous Q8H    clopidogrel  75 mg Oral Daily    doxycycline  100 mg Oral BID    ferrous sulfate  325 mg Oral Daily    magnesium oxide  800 mg Oral BID    rosuvastatin  20 mg Oral Daily    senna-docusate 8.6-50 mg  1 tablet Oral BID    tamsulosin  0.4 mg Oral Daily    valsartan  160 mg Oral BID     PRN Meds:fentaNYL, hydrocodone-acetaminophen 7.5-325mg, labetalol, oxyCODONE, sodium chloride 0.9%, sodium chloride 0.9%     Objective:     Vital Signs (Most Recent):  Temp: 98.2 °F (36.8 °C) (04/10/18 0728)  Pulse: 75 (04/10/18 0728)  Resp: 20 (04/10/18 0728)  BP: (!) 106/57 (04/10/18 0728)  SpO2: 98 % (04/10/18 0728) Vital Signs (24h Range):  Temp:  [97.1 °F (36.2 °C)-99.1 °F (37.3 °C)] 98.2 °F (36.8 °C)  Pulse:  [63-94] 75  Resp:  [10-88] 20  SpO2:  [95 %-100 %] 98 %  BP: (106-189)/() 106/57  Arterial Line BP: (189-206)/(58-76) 198/61          Physical Exam   Constitutional: He is oriented to person, place, and time. He appears well-developed and well-nourished. No distress.   HENT:   Head: Normocephalic and atraumatic.   Eyes: EOM are normal. Pupils are equal, round, and reactive to light.   Neck: Normal range of motion. Neck supple.   Cardiovascular: Normal rate and regular rhythm.    Palpable 2+ left PT  No hematomas appreciated at incisions   Pulmonary/Chest: Effort normal. No respiratory distress.   Abdominal: Soft. He exhibits no distension.   Musculoskeletal: Normal range of motion.   Neurological: He is alert and oriented to person, place, and time.   Skin: Skin is warm and dry. He is not diaphoretic.   Psychiatric: He has a normal mood and affect. His behavior is normal. Judgment and thought content normal.   Nursing note and vitals reviewed.      Significant Labs:  CBC:   Recent Labs  Lab 04/10/18  0430   WBC 9.23  9.23   RBC 2.81*  2.81*   HGB 8.6*  8.6*   HCT 25.7*   25.7*     227   MCV 92  92   MCH 30.6  30.6   MCHC 33.5  33.5     CMP:   Recent Labs  Lab 04/10/18  0430     105   CALCIUM 8.5*  8.5*   ALBUMIN 2.8*  2.8*   PROT 6.2  6.2   *  135*   K 4.3  4.3   CO2 24  24     104   BUN 22  22   CREATININE 1.2  1.2   ALKPHOS 91  91   ALT 10  10   AST 17  17   BILITOT 0.3  0.3       Significant Diagnostics:  I have reviewed all pertinent imaging results/findings within the past 24 hours.

## 2018-04-10 NOTE — ASSESSMENT & PLAN NOTE
Patient is 74 yo male with PVD who underwent left SFA to PT bypass on 4/9/18.    - cardiac diet  - convert PCA to PO pain meds  - PT/OT   - d/c mIVF  - d/c khan  - d/c a-line  - ASA, plavix  - Home meds
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
## 2018-04-10 NOTE — PROGRESS NOTES
Pt urinated 250ml this shift, bladder scan shows 497ml. Pt c/o burning when urinating. Notified Dr. Duron, verbalized understanding. UA ordered. Will continue to monitor.

## 2018-04-11 LAB
ALBUMIN SERPL BCP-MCNC: 2.9 G/DL
ALP SERPL-CCNC: 98 U/L
ALT SERPL W/O P-5'-P-CCNC: 8 U/L
ANION GAP SERPL CALC-SCNC: 7 MMOL/L
AST SERPL-CCNC: 20 U/L
BASOPHILS # BLD AUTO: 0 K/UL
BASOPHILS NFR BLD: 0 %
BILIRUB SERPL-MCNC: 0.5 MG/DL
BUN SERPL-MCNC: 18 MG/DL
CALCIUM SERPL-MCNC: 9.2 MG/DL
CHLORIDE SERPL-SCNC: 99 MMOL/L
CO2 SERPL-SCNC: 24 MMOL/L
CREAT SERPL-MCNC: 1.1 MG/DL
DIFFERENTIAL METHOD: ABNORMAL
EOSINOPHIL # BLD AUTO: 0 K/UL
EOSINOPHIL NFR BLD: 0.1 %
ERYTHROCYTE [DISTWIDTH] IN BLOOD BY AUTOMATED COUNT: 15.2 %
EST. GFR  (AFRICAN AMERICAN): >60 ML/MIN/1.73 M^2
EST. GFR  (NON AFRICAN AMERICAN): >60 ML/MIN/1.73 M^2
GLUCOSE SERPL-MCNC: 97 MG/DL
HCT VFR BLD AUTO: 27.6 %
HGB BLD-MCNC: 9 G/DL
IMM GRANULOCYTES # BLD AUTO: 0.02 K/UL
IMM GRANULOCYTES NFR BLD AUTO: 0.2 %
LYMPHOCYTES # BLD AUTO: 1.2 K/UL
LYMPHOCYTES NFR BLD: 12.4 %
MAGNESIUM SERPL-MCNC: 1.7 MG/DL
MCH RBC QN AUTO: 29.9 PG
MCHC RBC AUTO-ENTMCNC: 32.6 G/DL
MCV RBC AUTO: 92 FL
MONOCYTES # BLD AUTO: 0.8 K/UL
MONOCYTES NFR BLD: 8.8 %
NEUTROPHILS # BLD AUTO: 7.4 K/UL
NEUTROPHILS NFR BLD: 78.5 %
NRBC BLD-RTO: 0 /100 WBC
PHOSPHATE SERPL-MCNC: 3.2 MG/DL
PLATELET # BLD AUTO: 246 K/UL
PMV BLD AUTO: 8.4 FL
POTASSIUM SERPL-SCNC: 4.1 MMOL/L
PROT SERPL-MCNC: 6.8 G/DL
RBC # BLD AUTO: 3.01 M/UL
SODIUM SERPL-SCNC: 130 MMOL/L
WBC # BLD AUTO: 9.36 K/UL

## 2018-04-11 PROCEDURE — 97116 GAIT TRAINING THERAPY: CPT

## 2018-04-11 PROCEDURE — 94761 N-INVAS EAR/PLS OXIMETRY MLT: CPT

## 2018-04-11 PROCEDURE — 85025 COMPLETE CBC W/AUTO DIFF WBC: CPT

## 2018-04-11 PROCEDURE — 36415 COLL VENOUS BLD VENIPUNCTURE: CPT

## 2018-04-11 PROCEDURE — 97535 SELF CARE MNGMENT TRAINING: CPT

## 2018-04-11 PROCEDURE — 25000003 PHARM REV CODE 250: Performed by: SURGERY

## 2018-04-11 PROCEDURE — 63600175 PHARM REV CODE 636 W HCPCS: Performed by: SURGERY

## 2018-04-11 PROCEDURE — 84100 ASSAY OF PHOSPHORUS: CPT

## 2018-04-11 PROCEDURE — 80053 COMPREHEN METABOLIC PANEL: CPT

## 2018-04-11 PROCEDURE — 20600001 HC STEP DOWN PRIVATE ROOM

## 2018-04-11 PROCEDURE — 83735 ASSAY OF MAGNESIUM: CPT

## 2018-04-11 PROCEDURE — 25000003 PHARM REV CODE 250: Performed by: STUDENT IN AN ORGANIZED HEALTH CARE EDUCATION/TRAINING PROGRAM

## 2018-04-11 PROCEDURE — 97110 THERAPEUTIC EXERCISES: CPT

## 2018-04-11 RX ORDER — LANOLIN ALCOHOL/MO/W.PET/CERES
800 CREAM (GRAM) TOPICAL 2 TIMES DAILY
Status: COMPLETED | OUTPATIENT
Start: 2018-04-11 | End: 2018-04-11

## 2018-04-11 RX ADMIN — SODIUM CHLORIDE 500 ML: 9 INJECTION, SOLUTION INTRAVENOUS at 12:04

## 2018-04-11 RX ADMIN — HYDROCODONE BITARTRATE AND ACETAMINOPHEN 1 TABLET: 7.5; 325 TABLET ORAL at 10:04

## 2018-04-11 RX ADMIN — STANDARDIZED SENNA CONCENTRATE AND DOCUSATE SODIUM 1 TABLET: 8.6; 5 TABLET, FILM COATED ORAL at 08:04

## 2018-04-11 RX ADMIN — HYDROCODONE BITARTRATE AND ACETAMINOPHEN 1 TABLET: 7.5; 325 TABLET ORAL at 03:04

## 2018-04-11 RX ADMIN — VALSARTAN 160 MG: 160 TABLET, FILM COATED ORAL at 08:04

## 2018-04-11 RX ADMIN — FENTANYL CITRATE 25 MCG: 50 INJECTION, SOLUTION INTRAMUSCULAR; INTRAVENOUS at 11:04

## 2018-04-11 RX ADMIN — AMLODIPINE BESYLATE 5 MG: 5 TABLET ORAL at 08:04

## 2018-04-11 RX ADMIN — MAGNESIUM OXIDE TAB 400 MG (241.3 MG ELEMENTAL MG) 800 MG: 400 (241.3 MG) TAB at 08:04

## 2018-04-11 RX ADMIN — HYDROCODONE BITARTRATE AND ACETAMINOPHEN 1 TABLET: 7.5; 325 TABLET ORAL at 08:04

## 2018-04-11 RX ADMIN — CLOPIDOGREL 75 MG: 75 TABLET, FILM COATED ORAL at 08:04

## 2018-04-11 RX ADMIN — MAGNESIUM OXIDE TAB 400 MG (241.3 MG ELEMENTAL MG) 800 MG: 400 (241.3 MG) TAB at 10:04

## 2018-04-11 RX ADMIN — DOXYCYCLINE HYCLATE 100 MG: 100 TABLET, COATED ORAL at 08:04

## 2018-04-11 RX ADMIN — ROSUVASTATIN CALCIUM 20 MG: 20 TABLET, FILM COATED ORAL at 08:04

## 2018-04-11 RX ADMIN — OXYCODONE HYDROCHLORIDE 5 MG: 5 TABLET ORAL at 04:04

## 2018-04-11 RX ADMIN — HYDROCODONE BITARTRATE AND ACETAMINOPHEN 1 TABLET: 7.5; 325 TABLET ORAL at 01:04

## 2018-04-11 RX ADMIN — FERROUS SULFATE TAB EC 325 MG (65 MG FE EQUIVALENT) 325 MG: 325 (65 FE) TABLET DELAYED RESPONSE at 08:04

## 2018-04-11 RX ADMIN — TAMSULOSIN HYDROCHLORIDE 0.8 MG: 0.4 CAPSULE ORAL at 08:04

## 2018-04-11 NOTE — ASSESSMENT & PLAN NOTE
Patient is 74 yo male with PVD who underwent left SFA to PT bypass on 4/9/18.    - Urinary retention - will place khan, flomax 0.8 mg daily  - cardiac diet  - PO pain meds  - PT/OT  - ASA, plavix  - Home meds

## 2018-04-11 NOTE — PT/OT/SLP PROGRESS
Physical Therapy Treatment    Patient Name:  Ayaan Ricks   MRN:  [id number]    Recommendations:     Discharge Recommendations:  home health PT (HHPT with 24 hr supervision)   Discharge Equipment Recommendations: walker, rolling, bedside commode   Barriers to discharge: Inaccessible home and Decreased caregiver support    Assessment:     Ayaan Ricks is a 73 y.o. male admitted with a medical diagnosis of Atherosclerotic peripheral vascular disease with rest pain.  He presents with the following impairments/functional limitations:  weakness, impaired endurance, pain, gait instability, impaired balance, impaired self care skills, decreased lower extremity function, impaired sensation, impaired cardiopulmonary response to activity. Pt cooperative and agreeable to treatment. Pt tolerated treatment well except for increased work of breathing during treatment session. Pt performed bed mobility with SBA and sit<>stand with Max using RW. Pt ambulated ~15' with RW and CGA-Max within hospital room before resting in the bedside chair. Pt performed LE therex in chair before expressing desire to use bedpan. Pt performed SPT with 3 steps with RW to bed. Pt performed toileting on bedpan seated EOB and sit<>stand with mod A for jorge-area cleaning and changing gowns. Pt then performed SPT with 3 steps with RW to bedside chair. Recommending HHPT with 24 assistance at this time.    Rehab Prognosis:  fair; patient would benefit from acute skilled PT services to address these deficits and reach maximum level of function.      Recent Surgery: Procedure(s) (LRB):  [id number] (Left) 2 Days Post-Op    Plan:     During this hospitalization, patient to be seen 5 x/week to address the above listed problems via gait training, therapeutic activities, therapeutic exercises  · Plan of Care Expires:  05/10/18   Plan of Care Reviewed with: patient    Subjective     Communicated with nursing prior to session. Patient found supine  "in bed upon PT entry to room, agreeable to treatment.      Chief Complaint: LLE pain  Patient comments/goals: "I want to try and walk today"  Pain/Comfort:  Pain Rating 1: 6/10 (LLE pain)  Pain Addressed 1: Reposition, Distraction  Pain Rating Post-Intervention 1: 8/10    Patients cultural, spiritual, Zoroastrianism conflicts given the current situation: none    Objective:     Patient found with: peripheral IV, khan catheter, telemetry, pulse ox (continuous), blood pressure cuff     General Precautions: Standard, fall   Orthopedic Precautions:N/A   Braces: N/A     Functional Mobility:  · Bed Mobility:     · Rolling Right: stand by assistance  · Scooting: stand by assistance  · Supine to Sit: stand by assistance  · Transfers:     · Sit to Stand:  minimum assistance with rolling walker  · Toilet Transfer: minimum assistance with  rolling walker  using  Stand Pivot and with 3 steps  · Gait: ~15' with CGA-Max and using RW; decreased step length and gait speed; not wanting to WB on L; no LOB  · Balance: good static sitting balance seated EOB during bedpan use; fair static/dynamic standing balance during gait and transfer      AM-PAC 6 CLICK MOBILITY  Turning over in bed (including adjusting bedclothes, sheets and blankets)?: 3  Sitting down on and standing up from a chair with arms (e.g., wheelchair, bedside commode, etc.): 3  Moving from lying on back to sitting on the side of the bed?: 3  Moving to and from a bed to a chair (including a wheelchair)?: 3  Need to walk in hospital room?: 3  Climbing 3-5 steps with a railing?: 1  Total Score: 16       Therapeutic Activities and Exercises:   Pt performed bed mobility with SBA and sit<>stand with Max using RW. Pt ambulated ~15' with RW and CGA-Max within hospital room before resting in the bedside chair, before SPT with 3 steps with RW to bed. Pt performed toileting on bedpan seated EOB and sit<>stand with mod A for jorge-area cleaning and changing gowns. Pt then performed " SPT with 3 steps with RW to bedside chair. Pt performed BLE therex seated in chair x 10 reps: marches, TKEs, and hip abd/add.    PT educated pt on:  -importance of mobility  -PT POC  -getting up with assistance of nursing    Patient left up in chair with all lines intact, call button in reach and nurse present..    GOALS:    Physical Therapy Goals        Problem: Physical Therapy Goal    Goal Priority Disciplines Outcome Goal Variances Interventions   Physical Therapy Goal     PT/OT, PT Ongoing (interventions implemented as appropriate)     Description:  Goals to be met by: 18     Patient will increase functional independence with mobility by performin. Supine to sit with Modified Appling-not met  2. Sit to supine with Modified [place]-not met  3. Rolling to Right with Modified Appling.-not met  4. Sit to stand transfer with Modified [place]-not met  5. Gait  x 100 feet with Stand-by Assistance using Rolling Walker. -not met  6. Ascend/descend 4 stair with right Handrails Stand-by Assistance using least restrictive assistive device-not met                       Time Tracking:     PT Received On: 18  PT Start Time: 1434     PT Stop Time: 1504  PT Total Time (min): 30 min     Billable Minutes: Gait Training 22 mins and Therapeutic Exercise 8 mins    Treatment Type: Treatment  PT/PTA: PT           Virgie Arizmendi, CHIRAG  2018

## 2018-04-11 NOTE — PROGRESS NOTES
Pt with tachycardia (120s) and pain unrelieved by PO pain medication. Temp at this time 100.9.  All other vss. Dr. Bose notified of the above.  Will admin prn IV pain medication for BTP and continue to monitor/reassess pt's vss.

## 2018-04-11 NOTE — PROGRESS NOTES
Dr. Bose notified that pt has new c/o numbness to L foot.  Pt states this started a few minutes ago after getting to the chair.  Doppler checks done at this time, audible pulses to PT and DP.  Foot is warm.  MD verbalized understanding, MD to bedside.

## 2018-04-11 NOTE — PLAN OF CARE
Problem: Patient Care Overview  Goal: Plan of Care Review  Outcome: Ongoing (interventions implemented as appropriate)  Patient AAOx4. Plan of care and all safety precautions maintained and discussed. Vital signs stable throughout shift. PRN medication administered for complaint of left incisional pain. Pedal pulses dopplered Q4 hrs. No acute events throughout shift. Call bell in reach. Encouraged to call for assistance. Verbalized understanding. Will continue to monitor.

## 2018-04-11 NOTE — PLAN OF CARE
Problem: Patient Care Overview  Goal: Plan of Care Review  Plan of care reviewed with patient who verbalized understanding.  AAO. T max 100.9.  NV checks to LLE Q4.  Tolerating diet.  Pain better managed this afternoon.  LLE kept elevated.  Pt ambulated with therapy.  Tolerated sitting in the chair a few minutes. Call bell remains within reach.  Bed in lowest position.  Frequent rounds made for pt safety.  Patient remains free of any new or additional falls/injury at this time. VSS on RA, will continue to monitor.

## 2018-04-11 NOTE — SUBJECTIVE & OBJECTIVE
Medications:  Continuous Infusions:  Scheduled Meds:   amLODIPine  5 mg Oral Daily    clopidogrel  75 mg Oral Daily    doxycycline  100 mg Oral BID    ferrous sulfate  325 mg Oral Daily    rosuvastatin  20 mg Oral Daily    senna-docusate 8.6-50 mg  1 tablet Oral BID    tamsulosin  0.8 mg Oral Daily    valsartan  160 mg Oral BID     PRN Meds:fentaNYL, hydrocodone-acetaminophen 7.5-325mg, labetalol, oxyCODONE, sodium chloride 0.9%, sodium chloride 0.9%     Objective:     Vital Signs (Most Recent):  Temp: 99.7 °F (37.6 °C) (04/11/18 0747)  Pulse: 86 (04/11/18 0906)  Resp: 20 (04/11/18 0747)  BP: (!) 147/85 (04/11/18 0906)  SpO2: 99 % (04/11/18 0747) Vital Signs (24h Range):  Temp:  [98.9 °F (37.2 °C)-100.5 °F (38.1 °C)] 99.7 °F (37.6 °C)  Pulse:  [80-95] 86  Resp:  [16-20] 20  SpO2:  [98 %-99 %] 99 %  BP: (132-168)/(65-86) 147/85       Date 04/11/18 0700 - 04/12/18 0659   Shift 6751-8498 0065-6538 [id number] 24 Hour Total   I  N  T  A  K  E   Shift Total  (mL/kg)       O  U  T  P  U  T   Urine  (mL/kg/hr) 275   275    Shift Total  (mL/kg) 275  (5.1)   275  (5.1)   Weight (kg) 54.4 54.4 54.4 54.4       Physical Exam   Constitutional: He is oriented to person, place, and time. He appears well-developed and well-nourished. No distress.   HENT:   Head: Normocephalic and atraumatic.   Eyes: EOM are normal. Pupils are equal, round, and reactive to light.   Neck: Normal range of motion. Neck supple.   Cardiovascular: Normal rate and regular rhythm.    Palpable 2+ left PT  No hematomas appreciated at incisions, incisions CDI   Pulmonary/Chest: Effort normal. No respiratory distress.   Abdominal: Soft. He exhibits no distension.   Musculoskeletal: Normal range of motion.   Neurological: He is alert and oriented to person, place, and time.   Skin: Skin is warm and dry. He is not diaphoretic.   Psychiatric: He has a normal mood and affect. His behavior is normal. Judgment and thought content normal.   Nursing note and  vitals reviewed.      Significant Labs:  CBC:   Recent Labs  Lab 04/11/18  0728   WBC 9.36   RBC 3.01*   HGB 9.0*   HCT 27.6*      MCV 92   MCH 29.9   MCHC 32.6     CMP:   Recent Labs  Lab 04/11/18  0728   GLU 97   CALCIUM 9.2   ALBUMIN 2.9*   PROT 6.8   *   K 4.1   CO2 24   CL 99   BUN 18   CREATININE 1.1   ALKPHOS 98   ALT 8*   AST 20   BILITOT 0.5       Significant Diagnostics:  I have reviewed all pertinent imaging results/findings within the past 24 hours.

## 2018-04-11 NOTE — PT/OT/SLP PROGRESS
Occupational Therapy   Co-Treatment with Physical Therapy     Name: Ayaan Ricks  MRN: [id number]  Admitting Diagnosis:  Atherosclerotic peripheral vascular disease with rest pain  2 Days Post-Op    Recommendations:     Discharge Recommendations: home health OT  Discharge Equipment Recommendations:  bedside commode, walker, rolling  Barriers to discharge:  None    Subjective     Communicated with: RN prior to session. Pt agreeable to participate in OT session.   Pain/Comfort:  · Pain Rating 1:  (Pt with report of pain, initally 6/10 then progressing to 8/10 with movement )  · Location - Side 1: Left  · Location - Orientation 1: upper  · Location 1: leg  · Pain Addressed 1: Reposition, Cessation of Activity, Nurse notified    Patients cultural, spiritual, Gnosticism conflicts given the current situation: none noted or reported     Objective:     Patient found with: telemetry, pulse ox (continuous), blood pressure cuff, peripheral IV khan catheter     General Precautions: Standard, fall   Orthopedic Precautions:N/A   Braces: N/A     Occupational Performance:    Bed Mobility:    · Patient completed Rolling/Turning to Right with contact guard assistance  · Patient completed Supine to Sit with minimum assistance to CGA with HOB elevated     Functional Mobility/Transfers:  · Patient completed Sit <> Stand Transfer with minimum assistance  with  rolling walker   · Patient completed Bed <> Chair Transfer using Stand Pivot technique with minimum assistance with rolling walker with VC for proper hand placement and upright posture   · Functional Mobility: Pt performed functional mobility for household distances with min A to CGA  using RW to complete functional tasks, with VC required for safety and upright posture. Pt required min rest breaks secondary to reported/noted SOB and fatigue     Activities of Daily Living:  · UB Dressing: minimum assistance to don gown like a jacket sitting EOB secondary to line management    · LB Dressing: minimum assistance to don B socks in four point position sitting EOB   · Toileting: maximal assistance for thorough perineal clean-up and proper hygiene     Patient left up in chair with all lines intact, call button in reach and RN  notified    [facility] 6 Click:  [facility] Total Score: 16    Treatment & Education:  · Pt educated on safety awareness with functional transfers and with completion of ADLS, and OT POC   · Pt educated on and instructed on proper use and importance of incentive spirometer, with pt completing 5 trials   · Pt completed ADLS and functional mobility for treatment session as noted above  · Pt and pt family educated on importance of completing OOB with nursing staff assistance to increase pt functional activity tolerance and for the prevention of secondary complications following surgery  · Pt educated on line management in various set-ups (sitting in recliner, supine in bed, functional mobility tasks)  · White board/Communication board updated     Education:    Assessment:     Ayaan Ricks is a 73 y.o. male with a medical diagnosis of Atherosclerotic peripheral vascular disease with rest pain.  He presents with performance deficits affecting function including weakness, impaired endurance, impaired self care skills, impaired functional mobilty, gait instability, impaired balance, decreased lower extremity function, impaired cardiopulmonary response to activity. Pt required max to min A for completion of LB ADLs secondary to noted pain and SOB. Pt demonstrated increased OOB activity tolerance this treatment session. Pt will continue to benefit from skilled OT services to address the above listed impairments, decrease caregiver burden, and increase pt functional independence level. Recommend  OT services upon discharge with 24/7 family support.     Rehab Prognosis:  Good; patient would benefit from acute skilled OT services to address these deficits and reach maximum level of  function.       Plan:     Patient to be seen 5 x/week to address the above listed problems via self-care/home management, therapeutic activities, therapeutic exercises  · Plan of Care Expires: 05/10/18  · Plan of Care Reviewed with: patient    This Plan of care has been discussed with the patient who was involved in its development and understands and is in agreement with the identified goals and treatment plan    GOALS:    Occupational Therapy Goals        Problem: Occupational Therapy Goal    Goal Priority Disciplines Outcome Interventions   Occupational Therapy Goal     OT, PT/OT Ongoing (interventions implemented as appropriate)    Description:  Goals to be met by: 4/10/2018     Patient will increase functional independence with ADLs by performing:    UE Dressing with [place].  LE Dressing with Modified [place].  Grooming while standing with Modified [place].  Toileting from toilet with Modified [place] for hygiene and clothing management.   Bathing from  edge of bed with Minimal Assistance.  Supine to sit with Modified [place].  Stand pivot transfers with [place].  Toilet transfer to toilet with Modified [place].                      Time Tracking:     OT Date of Treatment: 04/11/18  OT Start Time: 1434  OT Stop Time: 1500  OT Total Time (min): 26 min    Billable Minutes:Self Care/Home Management 26    Jose Elias Dunham OT  4/11/2018

## 2018-04-11 NOTE — PLAN OF CARE
Problem: Physical Therapy Goal  Goal: Physical Therapy Goal  Goals to be met by: 18     Patient will increase functional independence with mobility by performin. Supine to sit with Modified [place]-not met  2. Sit to supine with Modified [place]-not met  3. Rolling to Right with Modified [place].-not met  4. Sit to stand transfer with Modified [place]-not met  5. Gait  x 100 feet with Stand-by Assistance using Rolling Walker. -not met  6. Ascend/descend 4 stair with right Handrails Stand-by Assistance using least restrictive assistive device-not met     Outcome: Ongoing (interventions implemented as appropriate)  Progressing toward goals.  No goals met today.

## 2018-04-11 NOTE — PLAN OF CARE
BELLE spoke with Mercedes at [facility] to inform of pending dc soon.  hh orders written  [facility]US Perry will [facility]  per mercedes      Appt at [facility] made on April 12 at 11:30 am   With Dr. Leni Oleary MD   bsc, rw ordered per therapy recs      Update: DR Bose called back and stated that pt is staying till tomorrow.  Notified Mercedes at [facility] to reschedule PCP appt   And she will sena me back w date/time  No future appointments.  Dr Bose requests khan f/u appt. Mercedes stated it's okay to make urology f/u here  appt made   0840 Wed April 18  Future Appointments  Date Time Provider Department Center   4/18/2018 8:40 AM Jessica Claire PA-C [facility] UROLOGY [facility]          04/11/18 0905   Final Note   Assessment Type Final Discharge Note   Discharge Disposition Home-Health  (LINUS PERRY WILL SET UP W [facility] )   Hospital Follow Up  Appt(s) scheduled? Yes   Discharge plans and expectations educations in teach back method with documentation complete? Yes   Right Care Referral Info   Post Acute Recommendation Home-care

## 2018-04-11 NOTE — PLAN OF CARE
Problem: Occupational Therapy Goal  Goal: Occupational Therapy Goal  Goals to be met by: 4/10/2018     Patient will increase functional independence with ADLs by performing:    UE Dressing with [place].  LE Dressing with Modified [place].  Grooming while standing with Modified [place].  Toileting from toilet with Modified [place] for hygiene and clothing management.   Bathing from  edge of bed with Minimal Assistance.  Supine to sit with Modified [place].  Stand pivot transfers with [place].  Toilet transfer to toilet with Modified [place].     Outcome: Ongoing (interventions implemented as appropriate)  Pt progressing towards all goals at this time. All goals remain appropriate. Revise goals as needed.    Jose Elias Dunham, OT  4/11/2018

## 2018-04-11 NOTE — PROGRESS NOTES
Dr. Bose notified that pt with frequent voids of < 100 cc urine.  Pt c/o fullness/ incomplete emptying.  Bladder scan shows 560 cc.  New order noted for Macias to be placed.  Will carry out and continue to monitor.

## 2018-04-11 NOTE — PLAN OF CARE
Update 4:03 PM  SW received a message via [employer].  Pt accepted by [facility] for HH services.    1:13pm  SW informed by CM that pt in need of Hh. Pt can receive HH services [facility].  SW sent referral to [facility].  SW will f/u on pt referral.              Gina Woody, LMSW [facility]  [id number]

## 2018-04-12 LAB
BASOPHILS # BLD AUTO: 0.01 K/UL
BASOPHILS NFR BLD: 0.1 %
DIFFERENTIAL METHOD: ABNORMAL
EOSINOPHIL # BLD AUTO: 0 K/UL
EOSINOPHIL NFR BLD: 0.3 %
ERYTHROCYTE [DISTWIDTH] IN BLOOD BY AUTOMATED COUNT: 14.7 %
HCT VFR BLD AUTO: 29.3 %
HGB BLD-MCNC: 9.8 G/DL
IMM GRANULOCYTES # BLD AUTO: 0.03 K/UL
IMM GRANULOCYTES NFR BLD AUTO: 0.3 %
LYMPHOCYTES # BLD AUTO: 1.2 K/UL
LYMPHOCYTES NFR BLD: 13.4 %
MCH RBC QN AUTO: 30.7 PG
MCHC RBC AUTO-ENTMCNC: 33.4 G/DL
MCV RBC AUTO: 92 FL
MONOCYTES # BLD AUTO: 0.8 K/UL
MONOCYTES NFR BLD: 9.2 %
NEUTROPHILS # BLD AUTO: 6.7 K/UL
NEUTROPHILS NFR BLD: 76.7 %
NRBC BLD-RTO: 0 /100 WBC
PLATELET # BLD AUTO: 226 K/UL
PMV BLD AUTO: 8.8 FL
RBC # BLD AUTO: 3.19 M/UL
WBC # BLD AUTO: 8.76 K/UL

## 2018-04-12 PROCEDURE — G8988 SELF CARE GOAL STATUS: HCPCS | Mod: CJ

## 2018-04-12 PROCEDURE — 93005 ELECTROCARDIOGRAM TRACING: CPT

## 2018-04-12 PROCEDURE — 63600175 PHARM REV CODE 636 W HCPCS: Performed by: SURGERY

## 2018-04-12 PROCEDURE — 94799 UNLISTED PULMONARY SVC/PX: CPT

## 2018-04-12 PROCEDURE — 97530 THERAPEUTIC ACTIVITIES: CPT

## 2018-04-12 PROCEDURE — 94640 AIRWAY INHALATION TREATMENT: CPT

## 2018-04-12 PROCEDURE — 99900035 HC TECH TIME PER 15 MIN (STAT)

## 2018-04-12 PROCEDURE — G8987 SELF CARE CURRENT STATUS: HCPCS | Mod: CK

## 2018-04-12 PROCEDURE — 85025 COMPLETE CBC W/AUTO DIFF WBC: CPT

## 2018-04-12 PROCEDURE — 25000003 PHARM REV CODE 250: Performed by: SURGERY

## 2018-04-12 PROCEDURE — 25000242 PHARM REV CODE 250 ALT 637 W/ HCPCS: Performed by: SURGERY

## 2018-04-12 PROCEDURE — 97535 SELF CARE MNGMENT TRAINING: CPT

## 2018-04-12 PROCEDURE — 93010 ELECTROCARDIOGRAM REPORT: CPT | Mod: ,,, | Performed by: INTERNAL MEDICINE

## 2018-04-12 PROCEDURE — 97116 GAIT TRAINING THERAPY: CPT

## 2018-04-12 PROCEDURE — 36415 COLL VENOUS BLD VENIPUNCTURE: CPT

## 2018-04-12 PROCEDURE — 20600001 HC STEP DOWN PRIVATE ROOM

## 2018-04-12 RX ORDER — IPRATROPIUM BROMIDE AND ALBUTEROL SULFATE 2.5; .5 MG/3ML; MG/3ML
3 SOLUTION RESPIRATORY (INHALATION)
Status: DISCONTINUED | OUTPATIENT
Start: 2018-04-12 | End: 2018-04-12

## 2018-04-12 RX ORDER — HEPARIN SODIUM 5000 [USP'U]/ML
5000 INJECTION, SOLUTION INTRAVENOUS; SUBCUTANEOUS EVERY 8 HOURS
Status: DISCONTINUED | OUTPATIENT
Start: 2018-04-12 | End: 2018-04-13 | Stop reason: HOSPADM

## 2018-04-12 RX ORDER — IPRATROPIUM BROMIDE 0.5 MG/2.5ML
0.5 SOLUTION RESPIRATORY (INHALATION) EVERY 6 HOURS
Status: DISCONTINUED | OUTPATIENT
Start: 2018-04-12 | End: 2018-04-13

## 2018-04-12 RX ADMIN — AMLODIPINE BESYLATE 5 MG: 5 TABLET ORAL at 08:04

## 2018-04-12 RX ADMIN — FERROUS SULFATE TAB EC 325 MG (65 MG FE EQUIVALENT) 325 MG: 325 (65 FE) TABLET DELAYED RESPONSE at 08:04

## 2018-04-12 RX ADMIN — OXYCODONE HYDROCHLORIDE 5 MG: 5 TABLET ORAL at 08:04

## 2018-04-12 RX ADMIN — CLOPIDOGREL 75 MG: 75 TABLET, FILM COATED ORAL at 08:04

## 2018-04-12 RX ADMIN — SODIUM CHLORIDE, SODIUM LACTATE, POTASSIUM CHLORIDE, AND CALCIUM CHLORIDE 500 ML: 600; 310; 30; 20 INJECTION, SOLUTION INTRAVENOUS at 09:04

## 2018-04-12 RX ADMIN — VALSARTAN 160 MG: 160 TABLET, FILM COATED ORAL at 08:04

## 2018-04-12 RX ADMIN — FENTANYL CITRATE 25 MCG: 50 INJECTION, SOLUTION INTRAMUSCULAR; INTRAVENOUS at 11:04

## 2018-04-12 RX ADMIN — STANDARDIZED SENNA CONCENTRATE AND DOCUSATE SODIUM 1 TABLET: 8.6; 5 TABLET, FILM COATED ORAL at 08:04

## 2018-04-12 RX ADMIN — HYDROCODONE BITARTRATE AND ACETAMINOPHEN 1 TABLET: 7.5; 325 TABLET ORAL at 04:04

## 2018-04-12 RX ADMIN — ROSUVASTATIN CALCIUM 20 MG: 20 TABLET, FILM COATED ORAL at 08:04

## 2018-04-12 RX ADMIN — HYDROCODONE BITARTRATE AND ACETAMINOPHEN 1 TABLET: 7.5; 325 TABLET ORAL at 01:04

## 2018-04-12 RX ADMIN — FENTANYL CITRATE 25 MCG: 50 INJECTION, SOLUTION INTRAMUSCULAR; INTRAVENOUS at 03:04

## 2018-04-12 RX ADMIN — STANDARDIZED SENNA CONCENTRATE AND DOCUSATE SODIUM 1 TABLET: 8.6; 5 TABLET, FILM COATED ORAL at 09:04

## 2018-04-12 RX ADMIN — VALSARTAN 160 MG: 160 TABLET, FILM COATED ORAL at 09:04

## 2018-04-12 RX ADMIN — OXYCODONE HYDROCHLORIDE 5 MG: 5 TABLET ORAL at 07:04

## 2018-04-12 RX ADMIN — SODIUM CHLORIDE 500 ML: 9 INJECTION, SOLUTION INTRAVENOUS at 02:04

## 2018-04-12 RX ADMIN — HEPARIN SODIUM 5000 UNITS: 5000 INJECTION, SOLUTION INTRAVENOUS; SUBCUTANEOUS at 07:04

## 2018-04-12 RX ADMIN — IPRATROPIUM BROMIDE 0.5 MG: 0.5 SOLUTION RESPIRATORY (INHALATION) at 07:04

## 2018-04-12 RX ADMIN — TAMSULOSIN HYDROCHLORIDE 0.8 MG: 0.4 CAPSULE ORAL at 08:04

## 2018-04-12 RX ADMIN — DOXYCYCLINE HYCLATE 100 MG: 100 TABLET, COATED ORAL at 08:04

## 2018-04-12 NOTE — PLAN OF CARE
Timothy PT paged for PT/OT notes to be written w new recs: SNF  Per DR Bose    2.)   Paula Kohli can be reached at [phone]---[id number] of new therapy recs

## 2018-04-12 NOTE — PT/OT/SLP PROGRESS
Occupational Therapy   Treatment    Name: Ayaan Ricks  MRN: [id number]  Admitting Diagnosis:  Atherosclerotic peripheral vascular disease with rest pain  3 Days Post-Op    Recommendations:     Discharge Recommendations: nursing facility, skilled  Discharge Equipment Recommendations:  bedside commode, walker, rolling  Barriers to discharge:  None    Subjective     Communicated with: RN prior to session.  Pain/Comfort:  · Pain Rating 1: 5/10  · Location - Side 1: Left  · Location - Orientation 1: generalized  · Location 1: leg  · Pain Addressed 1: Reposition, Distraction, Cessation of Activity, Nurse notified  · Pain Rating Post-Intervention 1: 6/10    Patients cultural, spiritual, Scientology conflicts given the current situation: none noted or reported     Objective:     Patient found with: telemetry, khan catheter    General Precautions: Standard, fall   Orthopedic Precautions:N/A   Braces: N/A     Occupational Performance:    Bed Mobility:    · Patient completed Rolling/Turning to Left with  stand by assistance and with side rail  · Patient completed Scooting/Bridging with stand by assistance  · Patient completed Supine to Sit with stand by assistance and with side rail  · Patient completed Sit to Supine with stand by assistance     Functional Mobility/Transfers:  · Patient completed Sit <> Stand Transfer with minimum assistance with rolling walker   · Functional Mobility: Pt took 2 side steps to L toward HOB with CGA and use of RW    Activities of Daily Living:  · UB Dressing: setup assistance to don backward gown while seated EOB  · LB Dressing: maximal assistance to doff/don B socks while seated EOB; pt attempt to doff L sock and able to pull sock to toes but unable to take sock off of foot    Patient left supine with all lines intact, call button in reach and RN present    [facility] 6 Click:  [facility] Total Score: 16    Treatment & Education:  Further ambulation and self care not completed 2/2 pt HR in 140s in  sitting and 160s when standing. RN present to monitor HR during session  Pt educated on role of OT/POC  Pt educated on SNF rec  White board/communication board updated  Education:    Assessment:     Ayaan Ricks is a 73 y.o. male with a medical diagnosis of Atherosclerotic peripheral vascular disease with rest pain.  He presents with weakness and pain limiting safe functional mobility and self care. Pt with high HR limiting participation in functional mobility and self care this date.  Performance deficits affecting function are weakness, impaired endurance, gait instability, impaired functional mobilty, impaired balance, impaired self care skills, decreased lower extremity function, pain, decreased safety awareness.      Rehab Prognosis:  Good; patient would benefit from acute skilled OT services to address these deficits and reach maximum level of function.       Plan:     Patient to be seen 5 x/week to address the above listed problems via self-care/home management, therapeutic exercises, therapeutic activities  · Plan of Care Expires: 05/10/18  · Plan of Care Reviewed with: patient    This Plan of care has been discussed with the patient who was involved in its development and understands and is in agreement with the identified goals and treatment plan    GOALS:    Occupational Therapy Goals        Problem: Occupational Therapy Goal    Goal Priority Disciplines Outcome Interventions   Occupational Therapy Goal     OT, PT/OT Ongoing (interventions implemented as appropriate)    Description:  Goals to be met by: 4/10/2018     Patient will increase functional independence with ADLs by performing:    UE Dressing with [place].  LE Dressing with Modified [place].  Grooming while standing with Modified [place].  Toileting from toilet with Modified [place] for hygiene and clothing management.   Bathing from  edge of bed with Minimal Assistance.  Supine to sit with Modified [place].  Stand  pivot transfers with [place].  Toilet transfer to toilet with Modified [place].                      Time Tracking:     OT Date of Treatment: 04/12/18  OT Start Time: 1430  OT Stop Time: 1454  OT Total Time (min): 24 min    Billable Minutes:Self Care/Home Management 8  Therapeutic Activity 16    Radha Lu OT  4/12/2018

## 2018-04-12 NOTE — PLAN OF CARE
Problem: Patient Care Overview  Goal: Plan of Care Review  POC reviewed with pt, pt verbalized understanding. VSS. AAOx3. Pt c/o pain during shift,  relieved with prescribed meds. Pt denies nausea during shift.  Pt tolerating Cardiac diet. Pt moving independently in bed some assist needed with turning. Neurovascular check Q4 to Left foot with Doppler.  Pt remains free of falls, injuries and trauma during shift. No distress noted at this time, will continue to monitor.

## 2018-04-12 NOTE — PLAN OF CARE
Problem: Occupational Therapy Goal  Goal: Occupational Therapy Goal  Goals to be met by: 4/10/2018     Patient will increase functional independence with ADLs by performing:    UE Dressing with [place].  LE Dressing with Modified [place].  Grooming while standing with Modified [place].  Toileting from toilet with Modified [place] for hygiene and clothing management.   Bathing from  edge of bed with Minimal Assistance.  Supine to sit with Modified [place].  Stand pivot transfers with [place].  Toilet transfer to toilet with Modified [place].     Outcome: Ongoing (interventions implemented as appropriate)  Goals remain appropriate    Comments: Continue OT KARINE Lu OT  4/12/2018

## 2018-04-12 NOTE — PLAN OF CARE
Problem: Patient Care Overview  Goal: Plan of Care Review  POC reviewed with pt, pt verbalized understanding. VSS. AAOx3. Pt c/o pain during shift,  relieved with prescribed meds. Pt denies nausea during shift. Tachycardia, HR 140s at rest and up to 160s getting up. MD aware. EKG done, sinus tachycardia.  Pt tolerating Cardiac diet, low intake. Pt moving independently in bed some assist needed with turning. Neurovascular check Q4 to Left foot with Doppler. 500cc bolus NS given. Low urinary output. Macias still in place.  Pt remains free of falls, injuries and trauma during shift. No distress noted at this time, will continue to monitor.

## 2018-04-12 NOTE — PROGRESS NOTES
[facility]  Vascular Surgery  Progress Note    Patient Name: Ayaan Ricks  MRN: [id number]  Admission Date: 4/9/2018  Primary Care Provider: Primary Doctor No    Subjective:     Interval History: Had left calf pain with ambulation yesterday with slight changes to pulse exam, U/S showed good flow through graft, encouraged to continue to ambulate, pain well controlled, afebrile, HDS    Post-Op Info:  Procedure(s) (LRB):  [id number] (Left)   3 Days Post-Op       Medications:  Continuous Infusions:  Scheduled Meds:   amLODIPine  5 mg Oral Daily    clopidogrel  75 mg Oral Daily    doxycycline  100 mg Oral BID    ferrous sulfate  325 mg Oral Daily    rosuvastatin  20 mg Oral Daily    senna-docusate 8.6-50 mg  1 tablet Oral BID    tamsulosin  0.8 mg Oral Daily    valsartan  160 mg Oral BID     PRN Meds:fentaNYL, hydrocodone-acetaminophen 7.5-325mg, labetalol, oxyCODONE, sodium chloride 0.9%, sodium chloride 0.9%     Objective:     Vital Signs (Most Recent):  Temp: 99.4 °F (37.4 °C) (04/12/18 0720)  Pulse: 97 (04/12/18 0720)  Resp: 20 (04/12/18 0720)  BP: 138/81 (04/12/18 0720)  SpO2: 97 % (04/12/18 0720) Vital Signs (24h Range):  Temp:  [98.8 °F (37.1 °C)-100.9 °F (38.3 °C)] 99.4 °F (37.4 °C)  Pulse:  [] 97  Resp:  [16-20] 20  SpO2:  [96 %-99 %] 97 %  BP: (132-168)/(73-94) 138/81          Physical Exam   Constitutional: He is oriented to person, place, and time. He appears well-developed and well-nourished. No distress.   HENT:   Head: Normocephalic and atraumatic.   Eyes: EOM are normal. Pupils are equal, round, and reactive to light.   Neck: Normal range of motion. Neck supple.   Cardiovascular: Normal rate and regular rhythm.    Palpable 2+ left PT  No hematomas appreciated at incisions, incisions CDI   Pulmonary/Chest: Effort normal. No respiratory distress.   Abdominal: Soft. He exhibits no distension.   Musculoskeletal: Normal range of motion.   Neurological: He is alert  and oriented to person, place, and time.   Skin: Skin is warm and dry. He is not diaphoretic.   Psychiatric: He has a normal mood and affect. His behavior is normal. Judgment and thought content normal.   Nursing note and vitals reviewed.      Significant Labs:  CBC:   Recent Labs  Lab 04/11/18  0728   WBC 9.36   RBC 3.01*   HGB 9.0*   HCT 27.6*      MCV 92   MCH 29.9   MCHC 32.6     CMP:   Recent Labs  Lab 04/11/18  0728   GLU 97   CALCIUM 9.2   ALBUMIN 2.9*   PROT 6.8   *   K 4.1   CO2 24   CL 99   BUN 18   CREATININE 1.1   ALKPHOS 98   ALT 8*   AST 20   BILITOT 0.5       Significant Diagnostics:  I have reviewed all pertinent imaging results/findings within the past 24 hours.    Assessment/Plan:     PVD (peripheral vascular disease) with claudication    Patient is 74 yo male with PVD who underwent left SFA to PT bypass on 4/9/18.    - Urinary retention - will place khan, flomax 0.8 mg daily  - Will need follow up as outpatient with urology  - cardiac diet  - PO pain meds  - PT/OT - must get out of bed  - ASA, plavix  - Home meds            Nolan Bose MD  Vascular Surgery  [facility]

## 2018-04-12 NOTE — ASSESSMENT & PLAN NOTE
Patient is 74 yo male with PVD who underwent left SFA to PT bypass on 4/9/18.    - Urinary retention - will place khan, flomax 0.8 mg daily  - Will need follow up as outpatient with urology  - cardiac diet  - PO pain meds  - PT/OT - must get out of bed  - ASA, plavix  - Home meds

## 2018-04-12 NOTE — SUBJECTIVE & OBJECTIVE
Medications:  Continuous Infusions:  Scheduled Meds:   amLODIPine  5 mg Oral Daily    clopidogrel  75 mg Oral Daily    doxycycline  100 mg Oral BID    ferrous sulfate  325 mg Oral Daily    rosuvastatin  20 mg Oral Daily    senna-docusate 8.6-50 mg  1 tablet Oral BID    tamsulosin  0.8 mg Oral Daily    valsartan  160 mg Oral BID     PRN Meds:fentaNYL, hydrocodone-acetaminophen 7.5-325mg, labetalol, oxyCODONE, sodium chloride 0.9%, sodium chloride 0.9%     Objective:     Vital Signs (Most Recent):  Temp: 99.4 °F (37.4 °C) (04/12/18 0720)  Pulse: 97 (04/12/18 0720)  Resp: 20 (04/12/18 0720)  BP: 138/81 (04/12/18 0720)  SpO2: 97 % (04/12/18 0720) Vital Signs (24h Range):  Temp:  [98.8 °F (37.1 °C)-100.9 °F (38.3 °C)] 99.4 °F (37.4 °C)  Pulse:  [] 97  Resp:  [16-20] 20  SpO2:  [96 %-99 %] 97 %  BP: (132-168)/(73-94) 138/81          Physical Exam   Constitutional: He is oriented to person, place, and time. He appears well-developed and well-nourished. No distress.   HENT:   Head: Normocephalic and atraumatic.   Eyes: EOM are normal. Pupils are equal, round, and reactive to light.   Neck: Normal range of motion. Neck supple.   Cardiovascular: Normal rate and regular rhythm.    Palpable 2+ left PT  No hematomas appreciated at incisions, incisions CDI   Pulmonary/Chest: Effort normal. No respiratory distress.   Abdominal: Soft. He exhibits no distension.   Musculoskeletal: Normal range of motion.   Neurological: He is alert and oriented to person, place, and time.   Skin: Skin is warm and dry. He is not diaphoretic.   Psychiatric: He has a normal mood and affect. His behavior is normal. Judgment and thought content normal.   Nursing note and vitals reviewed.      Significant Labs:  CBC:   Recent Labs  Lab 04/11/18 0728   WBC 9.36   RBC 3.01*   HGB 9.0*   HCT 27.6*      MCV 92   MCH 29.9   MCHC 32.6     CMP:   Recent Labs  Lab 04/11/18  0728   GLU 97   CALCIUM 9.2   ALBUMIN 2.9*   PROT 6.8   *    K 4.1   CO2 24   CL 99   BUN 18   CREATININE 1.1   ALKPHOS 98   ALT 8*   AST 20   BILITOT 0.5       Significant Diagnostics:  I have reviewed all pertinent imaging results/findings within the past 24 hours.

## 2018-04-12 NOTE — PT/OT/SLP PROGRESS
Physical Therapy Treatment    Patient Name:  Ayaan Ricks   MRN:  [id number]    Recommendations:     Discharge Recommendations:  nursing facility, skilled   Discharge Equipment Recommendations: walker, rolling, bedside commode   Barriers to discharge: Inaccessible home and Decreased caregiver support    Assessment:     Ayaan Ricks is a 73 y.o. male admitted with a medical diagnosis of Atherosclerotic peripheral vascular disease with rest pain.  He presents with the following impairments/functional limitations:  weakness, impaired endurance, impaired self care skills, gait instability, impaired functional mobilty, impaired balance, decreased upper extremity function, impaired cardiopulmonary response to activity, pain, decreased lower extremity function. Patient demonstrated good participation despite pain and decreased cardiovascular endurance. Level of assistance required min A - SBA. Able to tolerate gait training 2 steps laterally at EOB and bed mobility. Recommending SNF. Skilled physical therapy is medically necessary to address the above impairments in order to return the patient back to their previous level of function.     Rehab Prognosis:  good; patient would benefit from acute skilled PT services to address these deficits and reach maximum level of function.      Recent Surgery: Procedure(s) (LRB):  [id number] (Left) 3 Days Post-Op    Plan:     During this hospitalization, patient to be seen 5 x/week to address the above listed problems via gait training, therapeutic activities, therapeutic exercises, neuromuscular re-education  · Plan of Care Expires:  05/10/18   Plan of Care Reviewed with: patient    Subjective     Communicated with nurse Santana prior to and during session.  Patient found with HOB elevated in right SL upon PT entry to room, agreeable to treatment.      Chief Complaint: pain  Patient comments/goals: to return home at [facility]  Pain/Comfort:  · Pain Rating 1:  7/10  · Location - Side 1: Left  · Location - Orientation 1: generalized  · Location 1: leg  · Pain Addressed 1: Reposition, Distraction, Cessation of Activity    Patients cultural, spiritual, Yarsanism conflicts given the current situation: none stated    Objective:     Patient found with: telemetry, peripheral IV, khan catheter     General Precautions: Standard, fall   Orthopedic Precautions:N/A   Braces: N/A     Functional Mobility:  · Bed Mobility:   Rolling to the left: Stand-by Assistance   Supine to Sit: Stand-by Assistance   Sit to Supine: Stand-by Assistance   Scooting to HOB: Stand-by Assistance   Scooting at EOB: Stand-by Assistance    · Sitting Balance at Edge of Bed:   Assistance Level Required: Stand-by Assistance   Time: 10 minutes   Postural deviations noted:    -       Rounded shoulders  -       Forward head    · Transfers:   Sit to Stand: Minimal Assistance with Rolling Walker x 2 trials   Stand to Sit: Contact Guard Assistance with Rolling Walker x 2 trials    · Gait:   Distance Ambulated: Pt ambulated x2 steps to the left at the EOB. Pt demo increased trunk flexion, decreased BLE weight bearing, increased BUE weight bearing, decreased weight shifting ability, and req VC/TCs for step/RW sequencing and progression.   Assistance level: Contact Guard Assistance   Assistive Device used:  Rolling Walker   Gait Pattern: 4-point gait and partial weightbearing   Gait Deviation(s): decreased liliya, increased time in double stance, decreased velocity of limb motion, decreased step length and decreased stride length   Impairments due to: weakness, pain, fatigue, balance dysfunction, gait dysfunction, decreased cardiovascular endurance       AM-PAC 6 CLICK MOBILITY  Turning over in bed (including adjusting bedclothes, sheets and blankets)?: 3  Sitting down on and standing up from a chair with arms (e.g., wheelchair, bedside commode, etc.): 3  Moving from lying on back to sitting on the side of the bed?:  3  Moving to and from a bed to a chair (including a wheelchair)?: 2  Need to walk in hospital room?: 2  Climbing 3-5 steps with a railing?: 2  Total Score: 15       Therapeutic Activities and Exercises:  PT arrived to patient's room to find patient resting quietly; agreeable to PT session. Patient performed mobility as above with non-skid socks in place.    Patient donned and doffed socks and modesty gown in sitting with max assist.  HR in supine 140 bpm, sitting 148 bpm, standing 160 bpm.  · Patient Education:   · PT POC   · Gait training with RW as AD  · Deep breathing and sequencing  · PT rec to dc to SNF due to decreasing functional mobility status   · EOB activity:  · Donning and doffing socks and modesty gown donning   Bedside table in front of patient and area set up for function, convenience, and safety. RN aware of patient's mobility needs and status. Questions/concerns addressed within PT scope of practice; patient with no further questions.       Patient left HOB elevated with all lines intact, call button in reach and nurse Esther present..    GOALS:    Physical Therapy Goals        Problem: Physical Therapy Goal    Goal Priority Disciplines Outcome Goal Variances Interventions   Physical Therapy Goal     PT/OT, PT Ongoing (interventions implemented as appropriate)     Description:  Goals to be met by: 18     Patient will increase functional independence with mobility by performin. Supine to sit with Modified [place]-not met  2. Sit to supine with Modified [place]-not met  3. Rolling to Right with Modified [place].-not met  4. Sit to stand transfer with Modified [place]-not met  5. Gait  x 100 feet with Stand-by Assistance using Rolling Walker. -not met  6. Ascend/descend 4 stair with right Handrails Stand-by Assistance using least restrictive assistive device-not met                       Time Tracking:     PT Received On: 18  PT Start Time: 1430     PT Stop Time:  1454  PT Total Time (min): 24 min     Billable Minutes: Gait Training 8 and Therapeutic Activity 16    Treatment Type: Treatment  PT/PTA: PT         Jeannette Goldsmith PT, DPT  4/12/2018  [phone]

## 2018-04-12 NOTE — PLAN OF CARE
SW informed by CM that pt in need of SNF placement. Sw sent referral to [facility] for review.  SW will f/u on pt referral.               Gina Woody, TATUM  [facility]  [id number]

## 2018-04-12 NOTE — PLAN OF CARE
Problem: Physical Therapy Goal  Goal: Physical Therapy Goal  Goals to be met by: 18     Patient will increase functional independence with mobility by performin. Supine to sit with Modified [place]-not met  2. Sit to supine with Modified [place]-not met  3. Rolling to Right with Modified [place].-not met  4. Sit to stand transfer with Modified [place]-not met  5. Gait  x 100 feet with Stand-by Assistance using Rolling Walker. -not met  6. Ascend/descend 4 stair with right Handrails Stand-by Assistance using least restrictive assistive device-not met      Outcome: Ongoing (interventions implemented as appropriate)  Goals updated and appropriate to address patient's current needs.     Jeannette Goldsmith PT, DPT  2018  [phone]

## 2018-04-12 NOTE — PLAN OF CARE
Future Appointments  Date Time Provider Department Center   4/18/2018 8:40 AM Jessica Claire PA-C [facility] UROLOGY [facility]     Jeannette Goldsmith, PT Physical Therapist 4/12/2018 Sach[facility]ericTake Over    Pager: [phone]- paged to reeval pt this am per request of MD Bose.        Therapist Jeannette now rec SNF  Notes to be entered  Consult placed per CM. Referral done per LINUS Brewster  Pt agrees w plan

## 2018-04-13 ENCOUNTER — HOSPITAL ENCOUNTER (INPATIENT)
Facility: HOSPITAL | Age: 73
LOS: 10 days | Discharge: HOME HEALTH | DRG: 300 | End: 2018-04-23
Attending: INTERNAL MEDICINE | Admitting: SURGERY
Payer: MEDICARE

## 2018-04-13 VITALS
WEIGHT: 120 LBS | HEIGHT: 70 IN | BODY MASS INDEX: 17.18 KG/M2 | HEART RATE: 123 BPM | TEMPERATURE: 98 F | SYSTOLIC BLOOD PRESSURE: 108 MMHG | RESPIRATION RATE: 16 BRPM | OXYGEN SATURATION: 95 % | DIASTOLIC BLOOD PRESSURE: 65 MMHG

## 2018-04-13 DIAGNOSIS — R00.0 TACHYCARDIA: ICD-10-CM

## 2018-04-13 DIAGNOSIS — I10 ESSENTIAL HYPERTENSION: Primary | ICD-10-CM

## 2018-04-13 DIAGNOSIS — T83.511D: ICD-10-CM

## 2018-04-13 DIAGNOSIS — N13.9 ACUTE URINARY OBSTRUCTION: ICD-10-CM

## 2018-04-13 DIAGNOSIS — D62 ACUTE BLOOD LOSS ANEMIA: ICD-10-CM

## 2018-04-13 DIAGNOSIS — I73.9 PVD (PERIPHERAL VASCULAR DISEASE) WITH CLAUDICATION: ICD-10-CM

## 2018-04-13 DIAGNOSIS — Z98.890 S/P FEMORAL-TIBIAL BYPASS: ICD-10-CM

## 2018-04-13 LAB
BLD PROD TYP BPU: NORMAL
BLD PROD TYP BPU: NORMAL
BLOOD UNIT EXPIRATION DATE: NORMAL
BLOOD UNIT EXPIRATION DATE: NORMAL
BLOOD UNIT TYPE CODE: 7300
BLOOD UNIT TYPE CODE: 7300
BLOOD UNIT TYPE: NORMAL
BLOOD UNIT TYPE: NORMAL
CODING SYSTEM: NORMAL
CODING SYSTEM: NORMAL
DISPENSE STATUS: NORMAL
DISPENSE STATUS: NORMAL
TRANS ERYTHROCYTES VOL PATIENT: NORMAL ML
TRANS ERYTHROCYTES VOL PATIENT: NORMAL ML

## 2018-04-13 PROCEDURE — 94640 AIRWAY INHALATION TREATMENT: CPT

## 2018-04-13 PROCEDURE — 94761 N-INVAS EAR/PLS OXIMETRY MLT: CPT

## 2018-04-13 PROCEDURE — 25000003 PHARM REV CODE 250: Performed by: SURGERY

## 2018-04-13 PROCEDURE — 99900035 HC TECH TIME PER 15 MIN (STAT)

## 2018-04-13 PROCEDURE — 25000242 PHARM REV CODE 250 ALT 637 W/ HCPCS: Performed by: SURGERY

## 2018-04-13 PROCEDURE — 63600175 PHARM REV CODE 636 W HCPCS: Performed by: SURGERY

## 2018-04-13 PROCEDURE — 97116 GAIT TRAINING THERAPY: CPT

## 2018-04-13 PROCEDURE — 12000000 HC SNF SEMI-PRIVATE ROOM

## 2018-04-13 RX ORDER — CLOPIDOGREL BISULFATE 75 MG/1
75 TABLET ORAL DAILY
Status: DISCONTINUED | OUTPATIENT
Start: 2018-04-14 | End: 2018-04-23 | Stop reason: HOSPADM

## 2018-04-13 RX ORDER — CLOPIDOGREL BISULFATE 75 MG/1
75 TABLET ORAL DAILY
Status: CANCELLED | OUTPATIENT
Start: 2018-04-14

## 2018-04-13 RX ORDER — AMOXICILLIN 250 MG
1 CAPSULE ORAL 2 TIMES DAILY
Status: CANCELLED | OUTPATIENT
Start: 2018-04-13

## 2018-04-13 RX ORDER — TIOTROPIUM BROMIDE 18 UG/1
1 CAPSULE ORAL; RESPIRATORY (INHALATION) DAILY
Status: CANCELLED | OUTPATIENT
Start: 2018-04-14

## 2018-04-13 RX ORDER — LIDOCAINE AND PRILOCAINE 25; 25 MG/G; MG/G
CREAM TOPICAL ONCE
Status: COMPLETED | OUTPATIENT
Start: 2018-04-13 | End: 2018-04-13

## 2018-04-13 RX ORDER — FERROUS SULFATE 325(65) MG
325 TABLET, DELAYED RELEASE (ENTERIC COATED) ORAL DAILY
Status: CANCELLED | OUTPATIENT
Start: 2018-04-14

## 2018-04-13 RX ORDER — RAMELTEON 8 MG/1
8 TABLET ORAL NIGHTLY PRN
Status: CANCELLED | OUTPATIENT
Start: 2018-04-13

## 2018-04-13 RX ORDER — CALCIUM CARBONATE 200(500)MG
500 TABLET,CHEWABLE ORAL 2 TIMES DAILY PRN
Status: DISCONTINUED | OUTPATIENT
Start: 2018-04-13 | End: 2018-04-23 | Stop reason: HOSPADM

## 2018-04-13 RX ORDER — FERROUS SULFATE 325(65) MG
325 TABLET, DELAYED RELEASE (ENTERIC COATED) ORAL DAILY
Status: DISCONTINUED | OUTPATIENT
Start: 2018-04-14 | End: 2018-04-23 | Stop reason: HOSPADM

## 2018-04-13 RX ORDER — AMOXICILLIN 250 MG
1 CAPSULE ORAL 2 TIMES DAILY
Status: DISCONTINUED | OUTPATIENT
Start: 2018-04-14 | End: 2018-04-13

## 2018-04-13 RX ORDER — TIOTROPIUM BROMIDE 18 UG/1
1 CAPSULE ORAL; RESPIRATORY (INHALATION) DAILY
Status: DISCONTINUED | OUTPATIENT
Start: 2018-04-13 | End: 2018-04-13 | Stop reason: HOSPADM

## 2018-04-13 RX ORDER — OXYCODONE HYDROCHLORIDE 5 MG/1
5 TABLET ORAL EVERY 6 HOURS PRN
Status: DISCONTINUED | OUTPATIENT
Start: 2018-04-13 | End: 2018-04-14

## 2018-04-13 RX ORDER — AMLODIPINE BESYLATE 5 MG/1
5 TABLET ORAL DAILY
Status: DISCONTINUED | OUTPATIENT
Start: 2018-04-14 | End: 2018-04-19

## 2018-04-13 RX ORDER — ROSUVASTATIN CALCIUM 10 MG/1
20 TABLET, COATED ORAL DAILY
Status: DISCONTINUED | OUTPATIENT
Start: 2018-04-14 | End: 2018-04-23 | Stop reason: HOSPADM

## 2018-04-13 RX ORDER — ACETAMINOPHEN 325 MG/1
650 TABLET ORAL EVERY 6 HOURS PRN
Status: DISCONTINUED | OUTPATIENT
Start: 2018-04-13 | End: 2018-04-23 | Stop reason: HOSPADM

## 2018-04-13 RX ORDER — VALSARTAN 160 MG/1
160 TABLET ORAL 2 TIMES DAILY
Status: CANCELLED | OUTPATIENT
Start: 2018-04-13

## 2018-04-13 RX ORDER — CALCIUM CARBONATE 200(500)MG
500 TABLET,CHEWABLE ORAL 2 TIMES DAILY PRN
Status: CANCELLED | OUTPATIENT
Start: 2018-04-13

## 2018-04-13 RX ORDER — RAMELTEON 8 MG/1
8 TABLET ORAL NIGHTLY PRN
Status: DISCONTINUED | OUTPATIENT
Start: 2018-04-13 | End: 2018-04-23 | Stop reason: HOSPADM

## 2018-04-13 RX ORDER — ACETAMINOPHEN 325 MG/1
650 TABLET ORAL EVERY 6 HOURS PRN
Status: CANCELLED | OUTPATIENT
Start: 2018-04-13

## 2018-04-13 RX ORDER — ROSUVASTATIN CALCIUM 20 MG/1
20 TABLET, COATED ORAL DAILY
Status: CANCELLED | OUTPATIENT
Start: 2018-04-14

## 2018-04-13 RX ORDER — VALSARTAN 80 MG/1
160 TABLET ORAL 2 TIMES DAILY
Status: DISCONTINUED | OUTPATIENT
Start: 2018-04-14 | End: 2018-04-15

## 2018-04-13 RX ORDER — TAMSULOSIN HYDROCHLORIDE 0.4 MG/1
0.8 CAPSULE ORAL DAILY
Status: CANCELLED | OUTPATIENT
Start: 2018-04-14

## 2018-04-13 RX ORDER — TIOTROPIUM BROMIDE 18 UG/1
1 CAPSULE ORAL; RESPIRATORY (INHALATION) DAILY
Status: DISCONTINUED | OUTPATIENT
Start: 2018-04-14 | End: 2018-04-23 | Stop reason: HOSPADM

## 2018-04-13 RX ORDER — AMOXICILLIN 250 MG
1 CAPSULE ORAL 2 TIMES DAILY
Status: DISCONTINUED | OUTPATIENT
Start: 2018-04-13 | End: 2018-04-23 | Stop reason: HOSPADM

## 2018-04-13 RX ORDER — OXYCODONE HYDROCHLORIDE 5 MG/1
5 TABLET ORAL EVERY 6 HOURS PRN
Status: CANCELLED | OUTPATIENT
Start: 2018-04-13

## 2018-04-13 RX ORDER — AMLODIPINE BESYLATE 5 MG/1
5 TABLET ORAL DAILY
Status: CANCELLED | OUTPATIENT
Start: 2018-04-14

## 2018-04-13 RX ORDER — TAMSULOSIN HYDROCHLORIDE 0.4 MG/1
0.8 CAPSULE ORAL DAILY
Status: DISCONTINUED | OUTPATIENT
Start: 2018-04-14 | End: 2018-04-19

## 2018-04-13 RX ADMIN — IPRATROPIUM BROMIDE 0.5 MG: 0.5 SOLUTION RESPIRATORY (INHALATION) at 01:04

## 2018-04-13 RX ADMIN — STANDARDIZED SENNA CONCENTRATE AND DOCUSATE SODIUM 1 TABLET: 8.6; 5 TABLET, FILM COATED ORAL at 08:04

## 2018-04-13 RX ADMIN — VALSARTAN 160 MG: 160 TABLET, FILM COATED ORAL at 09:04

## 2018-04-13 RX ADMIN — AMLODIPINE BESYLATE 5 MG: 5 TABLET ORAL at 08:04

## 2018-04-13 RX ADMIN — OXYCODONE HYDROCHLORIDE 5 MG: 5 TABLET ORAL at 06:04

## 2018-04-13 RX ADMIN — FERROUS SULFATE TAB EC 325 MG (65 MG FE EQUIVALENT) 325 MG: 325 (65 FE) TABLET DELAYED RESPONSE at 08:04

## 2018-04-13 RX ADMIN — HYDROCODONE BITARTRATE AND ACETAMINOPHEN 1 TABLET: 7.5; 325 TABLET ORAL at 03:04

## 2018-04-13 RX ADMIN — HEPARIN SODIUM 5000 UNITS: 5000 INJECTION, SOLUTION INTRAVENOUS; SUBCUTANEOUS at 09:04

## 2018-04-13 RX ADMIN — IPRATROPIUM BROMIDE 0.5 MG: 0.5 SOLUTION RESPIRATORY (INHALATION) at 07:04

## 2018-04-13 RX ADMIN — FENTANYL CITRATE 25 MCG: 50 INJECTION, SOLUTION INTRAMUSCULAR; INTRAVENOUS at 12:04

## 2018-04-13 RX ADMIN — OXYCODONE HYDROCHLORIDE 5 MG: 5 TABLET ORAL at 12:04

## 2018-04-13 RX ADMIN — TAMSULOSIN HYDROCHLORIDE 0.8 MG: 0.4 CAPSULE ORAL at 08:04

## 2018-04-13 RX ADMIN — HEPARIN SODIUM 5000 UNITS: 5000 INJECTION, SOLUTION INTRAVENOUS; SUBCUTANEOUS at 05:04

## 2018-04-13 RX ADMIN — VALSARTAN 160 MG: 160 TABLET, FILM COATED ORAL at 08:04

## 2018-04-13 RX ADMIN — CLOPIDOGREL 75 MG: 75 TABLET, FILM COATED ORAL at 08:04

## 2018-04-13 RX ADMIN — STANDARDIZED SENNA CONCENTRATE AND DOCUSATE SODIUM 1 TABLET: 8.6; 5 TABLET, FILM COATED ORAL at 09:04

## 2018-04-13 RX ADMIN — HEPARIN SODIUM 5000 UNITS: 5000 INJECTION, SOLUTION INTRAVENOUS; SUBCUTANEOUS at 02:04

## 2018-04-13 RX ADMIN — TIOTROPIUM BROMIDE 18 MCG: 18 CAPSULE ORAL; RESPIRATORY (INHALATION) at 09:04

## 2018-04-13 RX ADMIN — ROSUVASTATIN CALCIUM 20 MG: 20 TABLET, FILM COATED ORAL at 08:04

## 2018-04-13 RX ADMIN — LIDOCAINE AND PRILOCAINE: 25; 25 CREAM TOPICAL at 06:04

## 2018-04-13 NOTE — ASSESSMENT & PLAN NOTE
Patient is 72 yo male with PVD who underwent left SFA to PT bypass on 4/9/18.    Tachycardia slightly improved from yesterday 's; will monitor for orthostatic hypotension   Saturating well on room air; chest xray clear; start spiriva, continue pulmonary toilet  Regular diet, nutrition supplementation  Discontinue khan; post residual bladder scan  OOB to chair; SNF placement pending

## 2018-04-13 NOTE — PLAN OF CARE
Problem: Patient Care Overview  Goal: Plan of Care Review  Outcome: Ongoing (interventions implemented as appropriate)  Plan of care reviewed with patient. Patient verbalized understanding.  Patient is AAO and VSS. Pain managed with PRN pain meds.  No complaint of nausea or vomiting. Macias placed for retention.  Tolerating cardiac diet.  On Cardiac monitoring running sinus tach. Neurovascular checks performed q4. Ambulates with one person assist and wheelchair.   Free of falls and injury. Will continue to monitor. Deana Guevara RN

## 2018-04-13 NOTE — SUBJECTIVE & OBJECTIVE
Medications:  Continuous Infusions:  Scheduled Meds:   amLODIPine  5 mg Oral Daily    clopidogrel  75 mg Oral Daily    ferrous sulfate  325 mg Oral Daily    heparin (porcine)  5,000 Units Subcutaneous Q8H    ipratropium  0.5 mg Nebulization Q6H    rosuvastatin  20 mg Oral Daily    senna-docusate 8.6-50 mg  1 tablet Oral BID    tamsulosin  0.8 mg Oral Daily    valsartan  160 mg Oral BID     PRN Meds:fentaNYL, hydrocodone-acetaminophen 7.5-325mg, labetalol, oxyCODONE, sodium chloride 0.9%, sodium chloride 0.9%     Objective:     Vital Signs (Most Recent):  Temp: 98 °F (36.7 °C) (04/13/18 0715)  Pulse: 99 (04/13/18 0731)  Resp: 18 (04/13/18 0731)  BP: 110/65 (04/13/18 0715)  SpO2: 95 % (04/13/18 0731) Vital Signs (24h Range):  Temp:  [97.1 °F (36.2 °C)-99.5 °F (37.5 °C)] 98 °F (36.7 °C)  Pulse:  [] 99  Resp:  [16-28] 18  SpO2:  [93 %-98 %] 95 %  BP: (110-150)/(65-77) 110/65          Physical Exam   Constitutional: He appears well-developed.   Cardiovascular: Normal rate and regular rhythm.    1+ left PT   Pulmonary/Chest: Effort normal. No respiratory distress.   Abdominal: Soft. He exhibits no distension. There is no tenderness.   Musculoskeletal: Normal range of motion. He exhibits no edema.   Incisions c/d/i   Neurological: He is alert.   Skin: Skin is warm and dry.       Significant Labs:  CBC:   Recent Labs  Lab 04/12/18  1654   WBC 8.76   RBC 3.19*   HGB 9.8*   HCT 29.3*      MCV 92   MCH 30.7   MCHC 33.4     CMP: No results for input(s): GLU, CALCIUM, ALBUMIN, PROT, NA, K, CO2, CL, BUN, CREATININE, ALKPHOS, ALT, AST, BILITOT in the last 48 hours.  Coagulation: No results for input(s): LABPROT, INR, APTT in the last 48 hours.    Significant Diagnostics:  US Lower Extremity Veins Bilateral [[id number]] Resulted: 04/12/18 2018   Order Status: Completed Updated: 04/12/18 2020   Narrative:     EXAMINATION:  US LOWER EXTREMITY VEINS BILATERAL    CLINICAL HISTORY:  dvt;    TECHNIQUE:  Duplex and  color flow Doppler and dynamic compression was performed of the bilateral lower extremity veins was performed.    COMPARISON:  Ultrasound left lower extremity veins 03/19/2018    FINDINGS:  Right thigh veins: The common femoral, femoral, popliteal, upper greater saphenous, and deep femoral veins are patent and free of thrombus. The veins are normally compressible and have normal phasic flow and augmentation response.    Right calf veins: The visualized calf veins are patent.    Left thigh veins: The left common femoral vein and left greater saphenous vein is obscured by overlying bandage material.  The femoral, popliteal,  and deep femoral veins are patent and free of thrombus. The veins are normally compressible and have normal phasic flow and augmentation response.    Left calf veins: The visualized calf veins are patent.    Miscellaneous: None   Impression:       No evidence of deep venous thrombosis in either lower extremity.    Electronically signed by resident: Corey Cain  Date: 04/12/2018  Time: 20:08    Electronically signed by: Kayode Horton MD

## 2018-04-13 NOTE — PT/OT/SLP PROGRESS
"Physical Therapy Treatment    Patient Name:  Ayaan Ricks   MRN:  [id number]    Recommendations:     Discharge Recommendations:  nursing facility, skilled   Discharge Equipment Recommendations: bedside commode, walker, rolling   Barriers to discharge: Inaccessible home and Decreased caregiver support lives alone with 4 PETRONA    Assessment:     Ayaan Ricks is a 73 y.o. male admitted with a medical diagnosis of Atherosclerotic peripheral vascular disease with rest pain.  He presents with the following impairments/functional limitations:  weakness, gait instability, impaired functional mobilty, decreased lower extremity function, pain .    Rehab Prognosis:  good; patient would benefit from acute skilled PT services to address these deficits and reach maximum level of function.      Recent Surgery: Procedure(s) (LRB):  [id number] (Left) 4 Days Post-Op    Plan:     During this hospitalization, patient to be seen 5 x/week to address the above listed problems via gait training, therapeutic activities, therapeutic exercises, neuromuscular re-education  · Plan of Care Expires:  05/10/18   Plan of Care Reviewed with: patient    Subjective     Communicated with nurse prior to session.  Patient found supine upon PT entry to room, agreeable to treatment.    Pt refused therapy in am and PT returned in pm with MD present  Chief Complaint: "The pain pills aren't doing anything for me"  Pain/Comfort:  · Pain Rating 1:  (pt c/o pain in his R groin area but did not grade)  · Location - Side 1: Right  · Location 1: groin  · Pain Addressed 1: Pre-medicate for activity, Reposition, Cessation of Activity    Patients cultural, spiritual, Uatsdin conflicts given the current situation: none noted    Objective:     Patient found with: telemetry     General Precautions: Standard, fall   Orthopedic Precautions:N/A   Braces: N/A     Functional Mobility:  · Bed Mobility:     · Supine to Sit: contact guard assistance  · Sit to " Supine: contact guard assistance  · Transfers:     · Sit to Stand:  contact guard assistance with rolling walker  · Gait: 18ft with RW with CGA; pt ambulated with flexed trunk, decreased step length, and required manual cues at times to direct the walker.      AM-PAC 6 CLICK MOBILITY  Turning over in bed (including adjusting bedclothes, sheets and blankets)?: 4  Sitting down on and standing up from a chair with arms (e.g., wheelchair, bedside commode, etc.): 3  Moving from lying on back to sitting on the side of the bed?: 3  Moving to and from a bed to a chair (including a wheelchair)?: 3  Need to walk in hospital room?: 3  Climbing 3-5 steps with a railing?: 2  Total Score: 18       Therapeutic Activities and Exercises:   Pt stood ~ 1 1/2 min to attempt to urinate with walker support with supervision and pt unable to urinate in bathroom, MD present    Patient left HOB elevated with all lines intact, call button in reach and MD present..    GOALS:    Physical Therapy Goals        Problem: Physical Therapy Goal    Goal Priority Disciplines Outcome Goal Variances Interventions   Physical Therapy Goal     PT/OT, PT Ongoing (interventions implemented as appropriate)     Description:  Goals to be met by: 18     Patient will increase functional independence with mobility by performin. Supine to sit with Modified [place]-not met  2. Sit to supine with Modified [place]-not met  3. Rolling to Right with Modified [place].-not met  4. Sit to stand transfer with Modified [place]-not met  5. Gait  x 100 feet with Stand-by Assistance using Rolling Walker. -not met  6. Ascend/descend 4 stair with right Handrails Stand-by Assistance using least restrictive assistive device-not met                       Time Tracking:     PT Received On: 18  PT Start Time: 1452     PT Stop Time: 1502 (time added due to PT returned to room)  PT Total Time (min): 10 min     Billable Minutes: Gait Training  10    Treatment Type: Treatment  PT/PTA: PT           Elaina Lind, PT  04/13/2018

## 2018-04-13 NOTE — PLAN OF CARE
Problem: Physical Therapy Goal  Goal: Physical Therapy Goal  Goals to be met by: 18     Patient will increase functional independence with mobility by performin. Supine to sit with Modified [place]-not met  2. Sit to supine with Modified [place]-not met  3. Rolling to Right with Modified [place].-not met  4. Sit to stand transfer with Modified [place]-not met  5. Gait  x 100 feet with Stand-by Assistance using Rolling Walker. -not met  6. Ascend/descend 4 stair with right Handrails Stand-by Assistance using least restrictive assistive device-not met      Outcome: Ongoing (interventions implemented as appropriate)  Pt's goals remain appropriate and pt will continue to benefit from skilled PT services to work towards improved functional mobility including: bed mobility, transfers, up/down steps, and gait.   Elaina Lind, PT  2018

## 2018-04-13 NOTE — CONSULTS
[facility] consult: Spoke to patient at bedside about transferring to [facility] upon discharge. Discussed transportation, rooming arrangements, and care provided at [facility]. Also, informed patient that the OSNF unit is expected to relocate to a new building at the end of the month but that this change will not affect patient care. Patient verbalized understanding of all of the following and agrees to transfer to [facility]. Patient given [facility] information pamphlet with contact information available. Nurse to call report to [phone] once Gen SNF orders are appropriate, thank you.

## 2018-04-13 NOTE — PLAN OF CARE
Problem: Patient Care Overview  Goal: Plan of Care Review  Outcome: Ongoing (interventions implemented as appropriate)  POC reviewed with patient. Pt AAOx4, VSS, Afebrile, SpO2> 92% on rm air, Sinus tach on telemetry. Pt complains of frequent pain, PRN meds given as needed. Left leg incision x 2 with dermabond CDI. Left leg puncture with gauze and tape CDI. Khan catheter in place for retention CDI, khan care provided, output monitored. Pt tolerating a cardiac diet, no complaints of nausea/vomiting. Pt urinating adequately per khan, output monitored. NV checks performed with doppler on L leg CDI. 500 mL bolus given as ordered. Pt up with assistance, remains free from falls and injuries, will continue to monitor.

## 2018-04-13 NOTE — PLAN OF CARE
Update 4:46 PM  [facility]  TATUM spoke with Chey who informed SW that the pt would transport at 5pm.     Update 4:40 PM  SW awating d/c readmit order to schedule transfer/transport to [facility]. MD will write d/c readmit orders.     Update 3:05 PM  TATUM spoke with Rekha at [facility].  TATUM informed by Rehka that pt accepted by [facility].  TATUM spoke with MD about this.  TATUM requested d/c re-admit orders.  MD will write orders.    10:00am  TATUM contacted Rekha with [facility] about pt referral.  TATUM informed by Rekha that the pt reeraal would be reviewed today for acceptance.  TATUM will f/u with  Rekha later today about pt referral.               Gina Woody, TATUM  [facility]  [id number]

## 2018-04-13 NOTE — PLAN OF CARE
04/13/18 0934   Discharge Reassessment   Assessment Type Discharge Planning Reassessment   Provided patient/caregiver education on the expected discharge date and the discharge plan No   Do you have any problems affording any of your prescribed medications? No   Discharge Plan A Skilled Nursing Facility   Discharge Plan B Home Health   Patient choice form signed by patient/caregiver No   Can the patient answer the patient profile reliably? Yes, cognitively intact   How does the patient rate their overall health at the present time? Fair

## 2018-04-13 NOTE — PT/OT/SLP PROGRESS
"Occupational Therapy      Patient Name:  Ayaan Ricks   MRN:  [id number]    Patient not seen today secondary to Patient unwilling to participate. Pt attempted in AM (1050) and PM (1238), but unwilling to sit EOB or get OOB stating "every time I get comfortable you want to come in a do something." Will follow-up at next scheduled session per POC.    Radha Lu OT  4/13/2018  "

## 2018-04-13 NOTE — PROGRESS NOTES
[facility]  Vascular Surgery  Progress Note    Patient Name: Ayaan Ricks  MRN: [id number]  Admission Date: 4/9/2018  Primary Care Provider: Primary Doctor No    Subjective:     Interval History: tachycardia noted when OOB to chair, responded to fluid boluses. US DVT done overnight, negative    Post-Op Info:  Procedure(s) (LRB):  [id number] (Left)   4 Days Post-Op       Medications:  Continuous Infusions:  Scheduled Meds:   amLODIPine  5 mg Oral Daily    clopidogrel  75 mg Oral Daily    ferrous sulfate  325 mg Oral Daily    heparin (porcine)  5,000 Units Subcutaneous Q8H    ipratropium  0.5 mg Nebulization Q6H    rosuvastatin  20 mg Oral Daily    senna-docusate 8.6-50 mg  1 tablet Oral BID    tamsulosin  0.8 mg Oral Daily    valsartan  160 mg Oral BID     PRN Meds:fentaNYL, hydrocodone-acetaminophen 7.5-325mg, labetalol, oxyCODONE, sodium chloride 0.9%, sodium chloride 0.9%     Objective:     Vital Signs (Most Recent):  Temp: 98 °F (36.7 °C) (04/13/18 0715)  Pulse: 99 (04/13/18 0731)  Resp: 18 (04/13/18 0731)  BP: 110/65 (04/13/18 0715)  SpO2: 95 % (04/13/18 0731) Vital Signs (24h Range):  Temp:  [97.1 °F (36.2 °C)-99.5 °F (37.5 °C)] 98 °F (36.7 °C)  Pulse:  [] 99  Resp:  [16-28] 18  SpO2:  [93 %-98 %] 95 %  BP: (110-150)/(65-77) 110/65          Physical Exam   Constitutional: He appears well-developed.   Cardiovascular: Normal rate and regular rhythm.    1+ left PT   Pulmonary/Chest: Effort normal. No respiratory distress.   Abdominal: Soft. He exhibits no distension. There is no tenderness.   Musculoskeletal: Normal range of motion. He exhibits no edema.   Incisions c/d/i   Neurological: He is alert.   Skin: Skin is warm and dry.       Significant Labs:  CBC:   Recent Labs  Lab 04/12/18  1654   WBC 8.76   RBC 3.19*   HGB 9.8*   HCT 29.3*      MCV 92   MCH 30.7   MCHC 33.4     CMP: No results for input(s): GLU, CALCIUM, ALBUMIN, PROT, NA, K, CO2, CL, BUN,  CREATININE, ALKPHOS, ALT, AST, BILITOT in the last 48 hours.  Coagulation: No results for input(s): LABPROT, INR, APTT in the last 48 hours.    Significant Diagnostics:  US Lower Extremity Veins Bilateral [[id number]] Resulted: 04/12/18 2018   Order Status: Completed Updated: 04/12/18 2020   Narrative:     EXAMINATION:  US LOWER EXTREMITY VEINS BILATERAL    CLINICAL HISTORY:  dvt;    TECHNIQUE:  Duplex and color flow Doppler and dynamic compression was performed of the bilateral lower extremity veins was performed.    COMPARISON:  Ultrasound left lower extremity veins 03/19/2018    FINDINGS:  Right thigh veins: The common femoral, femoral, popliteal, upper greater saphenous, and deep femoral veins are patent and free of thrombus. The veins are normally compressible and have normal phasic flow and augmentation response.    Right calf veins: The visualized calf veins are patent.    Left thigh veins: The left common femoral vein and left greater saphenous vein is obscured by overlying bandage material.  The femoral, popliteal,  and deep femoral veins are patent and free of thrombus. The veins are normally compressible and have normal phasic flow and augmentation response.    Left calf veins: The visualized calf veins are patent.    Miscellaneous: None   Impression:       No evidence of deep venous thrombosis in either lower extremity.    Electronically signed by resident: Corey Cain  Date: 04/12/2018  Time: 20:08    Electronically signed by: Kayode Horton MD         Assessment/Plan:     PVD (peripheral vascular disease) with claudication    Patient is 74 yo male with PVD who underwent left SFA to PT bypass on 4/9/18.    Tachycardia slightly improved from yesterday 's; will monitor for orthostatic hypotension   Saturating well on room air; chest xray clear; start spiriva, continue pulmonary toilet  Regular diet, nutrition supplementation  Discontinue khan; post residual bladder scan  OOB to chair; SNF  placement pending            Marilee Waters MD  Vascular Surgery  [facility]

## 2018-04-14 PROBLEM — Z98.890 S/P FEMORAL-TIBIAL BYPASS: Status: ACTIVE | Noted: 2018-04-14

## 2018-04-14 PROBLEM — E46 MALNUTRITION: Status: ACTIVE | Noted: 2018-04-14

## 2018-04-14 PROBLEM — T83.511A: Status: ACTIVE | Noted: 2018-04-14

## 2018-04-14 PROBLEM — N13.9 ACUTE URINARY OBSTRUCTION: Status: ACTIVE | Noted: 2018-04-14

## 2018-04-14 PROBLEM — R00.0 TACHYCARDIA: Status: ACTIVE | Noted: 2018-04-14

## 2018-04-14 PROBLEM — D62 ACUTE BLOOD LOSS ANEMIA: Status: ACTIVE | Noted: 2018-04-14

## 2018-04-14 PROBLEM — Z87.891 EX-HEAVY CIGARETTE SMOKER (20-39 PER DAY): Status: ACTIVE | Noted: 2018-04-14

## 2018-04-14 PROBLEM — T83.9XXA: Status: ACTIVE | Noted: 2018-04-14

## 2018-04-14 LAB
BACTERIA #/AREA URNS AUTO: ABNORMAL /HPF
BILIRUB UR QL STRIP: NEGATIVE
CLARITY UR REFRACT.AUTO: ABNORMAL
COLOR UR AUTO: YELLOW
GLUCOSE UR QL STRIP: NEGATIVE
HGB UR QL STRIP: ABNORMAL
HYALINE CASTS UR QL AUTO: 7 /LPF
KETONES UR QL STRIP: NEGATIVE
LEUKOCYTE ESTERASE UR QL STRIP: ABNORMAL
MICROSCOPIC COMMENT: ABNORMAL
NITRITE UR QL STRIP: NEGATIVE
PH UR STRIP: 5 [PH] (ref 5–8)
PROT UR QL STRIP: ABNORMAL
RBC #/AREA URNS AUTO: 15 /HPF (ref 0–4)
SP GR UR STRIP: 1.02 (ref 1–1.03)
SQUAMOUS #/AREA URNS AUTO: 0 /HPF
URN SPEC COLLECT METH UR: ABNORMAL
UROBILINOGEN UR STRIP-ACNC: NEGATIVE EU/DL
WBC #/AREA URNS AUTO: 47 /HPF (ref 0–5)
YEAST UR QL AUTO: ABNORMAL

## 2018-04-14 PROCEDURE — 87088 URINE BACTERIA CULTURE: CPT

## 2018-04-14 PROCEDURE — 81001 URINALYSIS AUTO W/SCOPE: CPT

## 2018-04-14 PROCEDURE — 25000003 PHARM REV CODE 250: Performed by: INTERNAL MEDICINE

## 2018-04-14 PROCEDURE — 99305 1ST NF CARE MODERATE MDM 35: CPT | Mod: ,,, | Performed by: INTERNAL MEDICINE

## 2018-04-14 PROCEDURE — 93010 ELECTROCARDIOGRAM REPORT: CPT | Mod: ,,, | Performed by: INTERNAL MEDICINE

## 2018-04-14 PROCEDURE — 12000000 HC SNF SEMI-PRIVATE ROOM

## 2018-04-14 PROCEDURE — 97535 SELF CARE MNGMENT TRAINING: CPT

## 2018-04-14 PROCEDURE — 97116 GAIT TRAINING THERAPY: CPT

## 2018-04-14 PROCEDURE — 97530 THERAPEUTIC ACTIVITIES: CPT

## 2018-04-14 PROCEDURE — 87106 FUNGI IDENTIFICATION YEAST: CPT

## 2018-04-14 PROCEDURE — 87086 URINE CULTURE/COLONY COUNT: CPT

## 2018-04-14 PROCEDURE — 97165 OT EVAL LOW COMPLEX 30 MIN: CPT

## 2018-04-14 PROCEDURE — 97802 MEDICAL NUTRITION INDIV IN: CPT

## 2018-04-14 PROCEDURE — 25000003 PHARM REV CODE 250: Performed by: STUDENT IN AN ORGANIZED HEALTH CARE EDUCATION/TRAINING PROGRAM

## 2018-04-14 PROCEDURE — 93005 ELECTROCARDIOGRAM TRACING: CPT

## 2018-04-14 PROCEDURE — 63600175 PHARM REV CODE 636 W HCPCS: Performed by: INTERNAL MEDICINE

## 2018-04-14 PROCEDURE — 25000003 PHARM REV CODE 250

## 2018-04-14 PROCEDURE — 97161 PT EVAL LOW COMPLEX 20 MIN: CPT

## 2018-04-14 RX ORDER — SULFAMETHOXAZOLE AND TRIMETHOPRIM 800; 160 MG/1; MG/1
1 TABLET ORAL 2 TIMES DAILY
Status: DISCONTINUED | OUTPATIENT
Start: 2018-04-14 | End: 2018-04-15

## 2018-04-14 RX ORDER — OXYCODONE AND ACETAMINOPHEN 5; 325 MG/1; MG/1
TABLET ORAL
Status: COMPLETED
Start: 2018-04-14 | End: 2018-04-14

## 2018-04-14 RX ORDER — HEPARIN SODIUM 5000 [USP'U]/ML
5000 INJECTION, SOLUTION INTRAVENOUS; SUBCUTANEOUS EVERY 8 HOURS
Status: DISCONTINUED | OUTPATIENT
Start: 2018-04-14 | End: 2018-04-23 | Stop reason: HOSPADM

## 2018-04-14 RX ORDER — OXYCODONE HYDROCHLORIDE 5 MG/1
5 TABLET ORAL EVERY 4 HOURS PRN
Status: DISCONTINUED | OUTPATIENT
Start: 2018-04-14 | End: 2018-04-23 | Stop reason: HOSPADM

## 2018-04-14 RX ADMIN — OXYCODONE HYDROCHLORIDE 5 MG: 5 TABLET ORAL at 03:04

## 2018-04-14 RX ADMIN — TAMSULOSIN HYDROCHLORIDE 0.8 MG: 0.4 CAPSULE ORAL at 09:04

## 2018-04-14 RX ADMIN — ACETAMINOPHEN 650 MG: 325 TABLET ORAL at 09:04

## 2018-04-14 RX ADMIN — CLOPIDOGREL BISULFATE 75 MG: 75 TABLET ORAL at 09:04

## 2018-04-14 RX ADMIN — ROSUVASTATIN CALCIUM 20 MG: 10 TABLET, FILM COATED ORAL at 09:04

## 2018-04-14 RX ADMIN — OXYCODONE HYDROCHLORIDE AND ACETAMINOPHEN 1 TABLET: 5; 325 TABLET ORAL at 11:04

## 2018-04-14 RX ADMIN — STANDARDIZED SENNA CONCENTRATE AND DOCUSATE SODIUM 1 TABLET: 8.6; 5 TABLET, FILM COATED ORAL at 09:04

## 2018-04-14 RX ADMIN — SULFAMETHOXAZOLE AND TRIMETHOPRIM 1 TABLET: 800; 160 TABLET ORAL at 03:04

## 2018-04-14 RX ADMIN — FERROUS SULFATE TAB EC 325 MG (65 MG FE EQUIVALENT) 325 MG: 325 (65 FE) TABLET DELAYED RESPONSE at 09:04

## 2018-04-14 RX ADMIN — RAMELTEON 8 MG: 8 TABLET, FILM COATED ORAL at 09:04

## 2018-04-14 RX ADMIN — OXYCODONE HYDROCHLORIDE 5 MG: 5 TABLET ORAL at 08:04

## 2018-04-14 RX ADMIN — VALSARTAN 160 MG: 80 TABLET ORAL at 09:04

## 2018-04-14 RX ADMIN — OXYCODONE HYDROCHLORIDE 5 MG: 5 TABLET ORAL at 05:04

## 2018-04-14 RX ADMIN — HEPARIN SODIUM 5000 UNITS: 5000 INJECTION, SOLUTION INTRAVENOUS; SUBCUTANEOUS at 09:04

## 2018-04-14 NOTE — PLAN OF CARE
Problem: Nutrition, Imbalanced: Inadequate Oral Intake (Adult)  Goal: Improved Oral Intake  Patient will demonstrate the desired outcomes by discharge/transition of care.  Outcome: Ongoing (interventions implemented as appropriate)  Recommendation/Intervention:   1. Add Boost Plus bid.   2. Encourage good intake at meals.   3. RD to follow up to monitor po intake    Goals:   Pt will consume at least 50-75% intake at meals  Nutrition Goal Status: new
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
## 2018-04-14 NOTE — PLAN OF CARE
Problem: Patient Care Overview  Goal: Plan of Care Review  Outcome: Ongoing (interventions implemented as appropriate)  POC reviewed with patient.  See flow sheet and MAR for implemented interventions.

## 2018-04-14 NOTE — HPI
Chief Complaint/Reason for Admission: PVD (peripheral vascular disease) with claudication    History of Present Illness:  Patient is a 73 y.o. male with COPD, HTN, BPH who presents to SNF after left distal SFA to posterior tibial bypass on 4/9 by Dr. Mahmood to treat PVD with rest pain. The patient has been having intermittent tachycardia since surgery.  No albuterol given but started on spiriva; long smoking history but quit 6 months ago.  He denies SOB or wheezing. He complains of 10/10 pain to urethral meatus with khan catheter.  This started 2-3 days in the hospital after intermittent catheterization but has been worsening.  Kahn placed on 4/12.  Oxycodone does relieve the pain but does not last the 6 hour duration.  He denies suprapubic pain or spasms. He complains of only soreness to his left leg and no pain.  The patient has been admitted to SNF for ongoing PT/OT due to insufficient progress to go home safely from the hospital.

## 2018-04-14 NOTE — ASSESSMENT & PLAN NOTE
Contributing Nutrition Diagnosis  Inadequate energy intake    Related to (etiology):   Decreased appetite/dx    Signs and Symptoms (as evidenced by):   BMI of 17, Alb 2.9L, recent poor intake    Interventions/Recommendations (treatment strategy):  See recs    Nutrition Diagnosis Status:   New

## 2018-04-14 NOTE — NURSING
Education given to pt. Patient verbalized understanding. All questions answered. Peripheral IV removed with catheter intact.  Report given to RN at [facility]. Awaiting transport for transfer to [facility].  Will continue to monitor. Deana Guevara RN

## 2018-04-14 NOTE — ASSESSMENT & PLAN NOTE
Improving  Patient with minimal pain to left leg  Continue oxycodone prn   Doppler checks every shift  -continue PT/OT to increase ambulation, ADL performance and endurance  -continue heparin for DVT prophylaxis  -continue fall precautions  -continue senokot-s to prevent constipation; hold for frequent or loose stooling  F/u with Dr. Mahmood

## 2018-04-14 NOTE — PLAN OF CARE
Problem: Occupational Therapy Goal  Goal: Occupational Therapy Goal  Goals to be met by: 14 days     Patient will increase functional independence with ADLs by performing:    UE Dressing with Modified [place].  LE Dressing with Modified [place] using AD as needed.  Grooming while standing at sink with Modified [place].  Toileting from toilet with Modified [place] for hygiene and clothing management.   Bathing from  edge of bed with Modified [place].  Toilet transfer to toilet with Modified [place].  Upper extremity exercise program x15 reps per handout, with supervision to improve UE strength/endurance to facilitate func mobility  Stand for 10 minutes with mod I during functional activity in order to perform safe standing ADLs/iADLs    Outcome: Ongoing (interventions implemented as appropriate)    Pt was agreeable to OT evaluation.  Goals established to assist pt with returning to PLOF regarding ADLs and func mobility.  Pt will benefit from skilled OT services in order to increase his level of safety and independence with ADLs and mobility.      Kiera Roldan, OT  4/14/2018

## 2018-04-14 NOTE — ASSESSMENT & PLAN NOTE
Patient started on iron therapy when hospitalized which will be continued  Monitor H/H with twice weekly labs
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
## 2018-04-14 NOTE — PLAN OF CARE
Problem: Physical Therapy Goal  Goal: Physical Therapy Goal  Goals to be met by: 14 days     Patient will increase functional independence with mobility by performin. Sit to stand transfer with Modified [place]  2. Bed to chair transfer with Modified [place] using Rolling Walker  3. Gait  x 150 feet with Modified [place] using Rolling Walker.   4. Wheelchair propulsion x300 feet with Modified [place] using bilateral uppper extremities  5. Ascend/descend 4 stair with right Handrails Supervision   6. Ascend/Descend 4 inch curb step with Supervision using Rolling Walker.  7. Lower extremity exercise program x20 reps per handout, with independence    Outcome: Ongoing (interventions implemented as appropriate)  Goals set today

## 2018-04-14 NOTE — PLAN OF CARE
Problem: Fall Risk (Adult)  Goal: Identify Related Risk Factors and Signs and Symptoms  Related risk factors and signs and symptoms are identified upon initiation of Human [employer] Clinical Practice Guideline (CPG)    04/13/18 5794   Fall Risk   Related Risk Factors (Fall Risk) bladder function altered;gait/mobility problems;impaired vision;polypharmacy   Signs and Symptoms (Fall Risk) presence of risk factors

## 2018-04-14 NOTE — ASSESSMENT & PLAN NOTE
Remains with khan  Continue therapy with tamsulosin which patient takes chronically    F/U with urology as scheduled if remains with obstruction

## 2018-04-14 NOTE — ASSESSMENT & PLAN NOTE
Chronic and controlled  Continue therapy with amlodipine and valsartan  -will continue to monitor and adjust regimen as necessary

## 2018-04-14 NOTE — ASSESSMENT & PLAN NOTE
Sinus tach on ECG  Likely due to pain from khan; treating UTI  Monitor H/H and transfuse as indicated
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
## 2018-04-14 NOTE — PLAN OF CARE
Problem: Fall Risk (Adult)  Goal: Identify Related Risk Factors and Signs and Symptoms  Related risk factors and signs and symptoms are identified upon initiation of Human Response Clinical Practice Guideline (CPG)    04/14/18 0116   Fall Risk   Related Risk Factors (Fall Risk) age-related changes;bladder function altered;polypharmacy;impaired vision   Signs and Symptoms (Fall Risk) presence of risk factors
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
## 2018-04-14 NOTE — PROGRESS NOTES
Patient care taken over from Deana. Pt VSS. Doppler checks in L leg stable. Discharge instructions performed by DONTAE Leblanc. Patient IV removed by DONTAE Leblanc. Colleen ALVARADO. Patient safely transferred into wheelchair. Transport moved patient out of room and to a rehab facility. Discharge completed.
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
## 2018-04-14 NOTE — ASSESSMENT & PLAN NOTE
Recent Results (from the past 24 hour(s))   Urinalysis    Collection Time: 04/14/18 11:35 AM   Result Value Ref Range    Specimen UA Urine, Catheterized     Color, UA Yellow Yellow, Straw, Tosha    Appearance, UA Hazy (A) Clear    pH, UA 5.0 5.0 - 8.0    Specific Gravity, UA 1.020 1.005 - 1.030    Protein, UA 1+ (A) Negative    Glucose, UA Negative Negative    Ketones, UA Negative Negative    Bilirubin (UA) Negative Negative    Occult Blood UA 2+ (A) Negative    Nitrite, UA Negative Negative    Urobilinogen, UA Negative <2.0 EU/dL    Leukocytes, UA 3+ (A) Negative   Urinalysis Microscopic    Collection Time: 04/14/18 11:35 AM   Result Value Ref Range    RBC, UA 15 (H) 0 - 4 /hpf    WBC, UA 47 (H) 0 - 5 /hpf    Bacteria, UA Rare None-Occ /hpf    Yeast, UA Rare (A) None    Squam Epithel, UA 0 /hpf    Hyaline Casts, UA 7 (A) 0-1/lpf /lpf    Microscopic Comment SEE COMMENT      Urine appears infected by U/A  Urine culture pending  Start bactrim therapy  Patient will need khan change if culture + but will also do a voiding trial at that time to potentially prevent re-insertion.

## 2018-04-14 NOTE — ASSESSMENT & PLAN NOTE
With dyspnea while admitted, neg CXR and started on titropium  Patient oxygenating well and without complaint currently  F/U with PCP for w/u for underlying obstructive disease  Continue titropium inhalation.  Monitor respiratory status.

## 2018-04-14 NOTE — ASSESSMENT & PLAN NOTE
S/p left SFA to posterior tibial bypass  Continue medical therapy with rosuvastatin, plavix  See plan below.

## 2018-04-15 LAB
ANION GAP SERPL CALC-SCNC: 10 MMOL/L
BUN SERPL-MCNC: 32 MG/DL
CALCIUM SERPL-MCNC: 8.8 MG/DL
CHLORIDE SERPL-SCNC: 98 MMOL/L
CO2 SERPL-SCNC: 23 MMOL/L
CREAT SERPL-MCNC: 1.6 MG/DL
EST. GFR  (AFRICAN AMERICAN): 48.7 ML/MIN/1.73 M^2
EST. GFR  (NON AFRICAN AMERICAN): 42.1 ML/MIN/1.73 M^2
GLUCOSE SERPL-MCNC: 117 MG/DL
POTASSIUM SERPL-SCNC: 3.9 MMOL/L
SODIUM SERPL-SCNC: 131 MMOL/L

## 2018-04-15 PROCEDURE — 63600175 PHARM REV CODE 636 W HCPCS: Performed by: INTERNAL MEDICINE

## 2018-04-15 PROCEDURE — 25000242 PHARM REV CODE 250 ALT 637 W/ HCPCS: Performed by: STUDENT IN AN ORGANIZED HEALTH CARE EDUCATION/TRAINING PROGRAM

## 2018-04-15 PROCEDURE — 25000003 PHARM REV CODE 250: Performed by: STUDENT IN AN ORGANIZED HEALTH CARE EDUCATION/TRAINING PROGRAM

## 2018-04-15 PROCEDURE — 12000000 HC SNF SEMI-PRIVATE ROOM

## 2018-04-15 PROCEDURE — 80048 BASIC METABOLIC PNL TOTAL CA: CPT

## 2018-04-15 PROCEDURE — 25000003 PHARM REV CODE 250: Performed by: INTERNAL MEDICINE

## 2018-04-15 RX ORDER — SODIUM CHLORIDE 9 MG/ML
INJECTION, SOLUTION INTRAVENOUS CONTINUOUS
Status: DISCONTINUED | OUTPATIENT
Start: 2018-04-15 | End: 2018-04-15

## 2018-04-15 RX ORDER — SODIUM CHLORIDE 9 MG/ML
INJECTION, SOLUTION INTRAVENOUS CONTINUOUS
Status: ACTIVE | OUTPATIENT
Start: 2018-04-15 | End: 2018-04-15

## 2018-04-15 RX ORDER — VALSARTAN 80 MG/1
160 TABLET ORAL DAILY
Status: DISCONTINUED | OUTPATIENT
Start: 2018-04-16 | End: 2018-04-19

## 2018-04-15 RX ORDER — CIPROFLOXACIN 500 MG/1
500 TABLET ORAL EVERY 12 HOURS
Status: DISCONTINUED | OUTPATIENT
Start: 2018-04-15 | End: 2018-04-16

## 2018-04-15 RX ADMIN — OXYCODONE HYDROCHLORIDE 5 MG: 5 TABLET ORAL at 05:04

## 2018-04-15 RX ADMIN — HEPARIN SODIUM 5000 UNITS: 5000 INJECTION, SOLUTION INTRAVENOUS; SUBCUTANEOUS at 05:04

## 2018-04-15 RX ADMIN — TAMSULOSIN HYDROCHLORIDE 0.8 MG: 0.4 CAPSULE ORAL at 08:04

## 2018-04-15 RX ADMIN — CLOPIDOGREL BISULFATE 75 MG: 75 TABLET ORAL at 08:04

## 2018-04-15 RX ADMIN — ROSUVASTATIN CALCIUM 20 MG: 10 TABLET, FILM COATED ORAL at 08:04

## 2018-04-15 RX ADMIN — FERROUS SULFATE TAB EC 325 MG (65 MG FE EQUIVALENT) 325 MG: 325 (65 FE) TABLET DELAYED RESPONSE at 08:04

## 2018-04-15 RX ADMIN — ACETAMINOPHEN 650 MG: 325 TABLET ORAL at 01:04

## 2018-04-15 RX ADMIN — OXYCODONE HYDROCHLORIDE 5 MG: 5 TABLET ORAL at 09:04

## 2018-04-15 RX ADMIN — SODIUM CHLORIDE: 0.9 INJECTION, SOLUTION INTRAVENOUS at 12:04

## 2018-04-15 RX ADMIN — STANDARDIZED SENNA CONCENTRATE AND DOCUSATE SODIUM 1 TABLET: 8.6; 5 TABLET, FILM COATED ORAL at 08:04

## 2018-04-15 RX ADMIN — SULFAMETHOXAZOLE AND TRIMETHOPRIM 1 TABLET: 800; 160 TABLET ORAL at 08:04

## 2018-04-15 RX ADMIN — OXYCODONE HYDROCHLORIDE 5 MG: 5 TABLET ORAL at 04:04

## 2018-04-15 RX ADMIN — HEPARIN SODIUM 5000 UNITS: 5000 INJECTION, SOLUTION INTRAVENOUS; SUBCUTANEOUS at 01:04

## 2018-04-15 RX ADMIN — ACETAMINOPHEN 650 MG: 325 TABLET ORAL at 04:04

## 2018-04-15 RX ADMIN — ACETAMINOPHEN 650 MG: 325 TABLET ORAL at 09:04

## 2018-04-15 RX ADMIN — STANDARDIZED SENNA CONCENTRATE AND DOCUSATE SODIUM 1 TABLET: 8.6; 5 TABLET, FILM COATED ORAL at 09:04

## 2018-04-15 RX ADMIN — TIOTROPIUM BROMIDE 18 MCG: 18 CAPSULE ORAL; RESPIRATORY (INHALATION) at 08:04

## 2018-04-15 RX ADMIN — RAMELTEON 8 MG: 8 TABLET, FILM COATED ORAL at 09:04

## 2018-04-15 RX ADMIN — HEPARIN SODIUM 5000 UNITS: 5000 INJECTION, SOLUTION INTRAVENOUS; SUBCUTANEOUS at 09:04

## 2018-04-15 RX ADMIN — OXYCODONE HYDROCHLORIDE 5 MG: 5 TABLET ORAL at 01:04

## 2018-04-15 RX ADMIN — CIPROFLOXACIN HYDROCHLORIDE 500 MG: 500 TABLET, FILM COATED ORAL at 09:04

## 2018-04-15 NOTE — PLAN OF CARE
Problem: Patient Care Overview  Goal: Plan of Care Review  Outcome: Ongoing (interventions implemented as appropriate)  POC reviewed with patient.  Interventions documented on MAR and flow sheet.

## 2018-04-15 NOTE — PLAN OF CARE
Problem: Patient Care Overview  Goal: Plan of Care Review  Outcome: Ongoing (interventions implemented as appropriate)   04/15/18 0026   Coping/Psychosocial   Plan Of Care Reviewed With patient       Problem: Fall Risk (Adult)  Goal: Identify Related Risk Factors and Signs and Symptoms  Related risk factors and signs and symptoms are identified upon initiation of Human Response Clinical Practice Guideline (CPG)   Outcome: Ongoing (interventions implemented as appropriate)   04/15/18 0026   Fall Risk   Related Risk Factors (Fall Risk) age-related changes;gait/mobility problems;homeostatic imbalance

## 2018-04-15 NOTE — NURSING
Patient c/o increasing pain to the instep of his left foot.  Doppler of L foot positive for pedal pulse and is warm to touch.  Charge nurse notified of patient complaint and is putting a call out to Dr. Damon re: this issue.

## 2018-04-16 ENCOUNTER — PATIENT OUTREACH (OUTPATIENT)
Dept: ADMINISTRATIVE | Facility: CLINIC | Age: 73
End: 2018-04-16

## 2018-04-16 PROBLEM — N17.9 AKI (ACUTE KIDNEY INJURY): Status: ACTIVE | Noted: 2018-04-16

## 2018-04-16 PROBLEM — E46 MALNUTRITION: Status: ACTIVE | Noted: 2018-04-16

## 2018-04-16 LAB
ANION GAP SERPL CALC-SCNC: 10 MMOL/L
BACTERIA UR CULT: NORMAL
BASOPHILS # BLD AUTO: 0.02 K/UL
BASOPHILS NFR BLD: 0.4 %
BUN SERPL-MCNC: 30 MG/DL
CALCIUM SERPL-MCNC: 8.9 MG/DL
CHLORIDE SERPL-SCNC: 102 MMOL/L
CO2 SERPL-SCNC: 23 MMOL/L
CREAT SERPL-MCNC: 1.5 MG/DL
DIFFERENTIAL METHOD: ABNORMAL
EOSINOPHIL # BLD AUTO: 0.2 K/UL
EOSINOPHIL NFR BLD: 3.6 %
ERYTHROCYTE [DISTWIDTH] IN BLOOD BY AUTOMATED COUNT: 14.5 %
EST. GFR  (AFRICAN AMERICAN): 52.6 ML/MIN/1.73 M^2
EST. GFR  (NON AFRICAN AMERICAN): 45.5 ML/MIN/1.73 M^2
GLUCOSE SERPL-MCNC: 80 MG/DL
HCT VFR BLD AUTO: 26.2 %
HGB BLD-MCNC: 8.6 G/DL
IMM GRANULOCYTES # BLD AUTO: 0.01 K/UL
IMM GRANULOCYTES NFR BLD AUTO: 0.2 %
LYMPHOCYTES # BLD AUTO: 1.1 K/UL
LYMPHOCYTES NFR BLD: 20 %
MAGNESIUM SERPL-MCNC: 1.9 MG/DL
MCH RBC QN AUTO: 30.6 PG
MCHC RBC AUTO-ENTMCNC: 32.8 G/DL
MCV RBC AUTO: 93 FL
MONOCYTES # BLD AUTO: 0.7 K/UL
MONOCYTES NFR BLD: 13.5 %
NEUTROPHILS # BLD AUTO: 3.3 K/UL
NEUTROPHILS NFR BLD: 62.3 %
NRBC BLD-RTO: 0 /100 WBC
PHOSPHATE SERPL-MCNC: 3.9 MG/DL
PLATELET # BLD AUTO: 294 K/UL
PMV BLD AUTO: 9.3 FL
POTASSIUM SERPL-SCNC: 4.7 MMOL/L
RBC # BLD AUTO: 2.81 M/UL
SODIUM SERPL-SCNC: 135 MMOL/L
WBC # BLD AUTO: 5.24 K/UL

## 2018-04-16 PROCEDURE — 99309 SBSQ NF CARE MODERATE MDM 30: CPT | Mod: ,,, | Performed by: NURSE PRACTITIONER

## 2018-04-16 PROCEDURE — 97116 GAIT TRAINING THERAPY: CPT

## 2018-04-16 PROCEDURE — G8988 SELF CARE GOAL STATUS: HCPCS | Mod: CK

## 2018-04-16 PROCEDURE — G8989 SELF CARE D/C STATUS: HCPCS | Mod: CK

## 2018-04-16 PROCEDURE — 84100 ASSAY OF PHOSPHORUS: CPT

## 2018-04-16 PROCEDURE — 12000000 HC SNF SEMI-PRIVATE ROOM

## 2018-04-16 PROCEDURE — 80048 BASIC METABOLIC PNL TOTAL CA: CPT

## 2018-04-16 PROCEDURE — 97803 MED NUTRITION INDIV SUBSEQ: CPT

## 2018-04-16 PROCEDURE — 97110 THERAPEUTIC EXERCISES: CPT

## 2018-04-16 PROCEDURE — 25000003 PHARM REV CODE 250: Performed by: STUDENT IN AN ORGANIZED HEALTH CARE EDUCATION/TRAINING PROGRAM

## 2018-04-16 PROCEDURE — 83735 ASSAY OF MAGNESIUM: CPT

## 2018-04-16 PROCEDURE — 63600175 PHARM REV CODE 636 W HCPCS: Performed by: INTERNAL MEDICINE

## 2018-04-16 PROCEDURE — 97530 THERAPEUTIC ACTIVITIES: CPT

## 2018-04-16 PROCEDURE — 25000003 PHARM REV CODE 250: Performed by: NURSE PRACTITIONER

## 2018-04-16 PROCEDURE — 25000242 PHARM REV CODE 250 ALT 637 W/ HCPCS: Performed by: STUDENT IN AN ORGANIZED HEALTH CARE EDUCATION/TRAINING PROGRAM

## 2018-04-16 PROCEDURE — 25000003 PHARM REV CODE 250: Performed by: INTERNAL MEDICINE

## 2018-04-16 PROCEDURE — 85025 COMPLETE CBC W/AUTO DIFF WBC: CPT

## 2018-04-16 PROCEDURE — 36415 COLL VENOUS BLD VENIPUNCTURE: CPT

## 2018-04-16 PROCEDURE — G8987 SELF CARE CURRENT STATUS: HCPCS | Mod: CK

## 2018-04-16 RX ORDER — SODIUM CHLORIDE 9 MG/ML
INJECTION, SOLUTION INTRAVENOUS CONTINUOUS
Status: ACTIVE | OUTPATIENT
Start: 2018-04-16 | End: 2018-04-17

## 2018-04-16 RX ORDER — FLUCONAZOLE 200 MG/1
200 TABLET ORAL DAILY
Status: DISCONTINUED | OUTPATIENT
Start: 2018-04-16 | End: 2018-04-23

## 2018-04-16 RX ADMIN — HEPARIN SODIUM 5000 UNITS: 5000 INJECTION, SOLUTION INTRAVENOUS; SUBCUTANEOUS at 09:04

## 2018-04-16 RX ADMIN — OXYCODONE HYDROCHLORIDE 5 MG: 5 TABLET ORAL at 08:04

## 2018-04-16 RX ADMIN — FERROUS SULFATE TAB EC 325 MG (65 MG FE EQUIVALENT) 325 MG: 325 (65 FE) TABLET DELAYED RESPONSE at 08:04

## 2018-04-16 RX ADMIN — CLOPIDOGREL BISULFATE 75 MG: 75 TABLET ORAL at 08:04

## 2018-04-16 RX ADMIN — TIOTROPIUM BROMIDE 18 MCG: 18 CAPSULE ORAL; RESPIRATORY (INHALATION) at 08:04

## 2018-04-16 RX ADMIN — OXYCODONE HYDROCHLORIDE 5 MG: 5 TABLET ORAL at 04:04

## 2018-04-16 RX ADMIN — OXYCODONE HYDROCHLORIDE 5 MG: 5 TABLET ORAL at 12:04

## 2018-04-16 RX ADMIN — FLUCONAZOLE 200 MG: 200 TABLET ORAL at 03:04

## 2018-04-16 RX ADMIN — SODIUM CHLORIDE: 0.9 INJECTION, SOLUTION INTRAVENOUS at 03:04

## 2018-04-16 RX ADMIN — TAMSULOSIN HYDROCHLORIDE 0.8 MG: 0.4 CAPSULE ORAL at 08:04

## 2018-04-16 RX ADMIN — HEPARIN SODIUM 5000 UNITS: 5000 INJECTION, SOLUTION INTRAVENOUS; SUBCUTANEOUS at 05:04

## 2018-04-16 RX ADMIN — STANDARDIZED SENNA CONCENTRATE AND DOCUSATE SODIUM 1 TABLET: 8.6; 5 TABLET, FILM COATED ORAL at 08:04

## 2018-04-16 RX ADMIN — ACETAMINOPHEN 650 MG: 325 TABLET ORAL at 04:04

## 2018-04-16 RX ADMIN — HEPARIN SODIUM 5000 UNITS: 5000 INJECTION, SOLUTION INTRAVENOUS; SUBCUTANEOUS at 03:04

## 2018-04-16 RX ADMIN — ROSUVASTATIN CALCIUM 20 MG: 10 TABLET, FILM COATED ORAL at 08:04

## 2018-04-16 RX ADMIN — OXYCODONE HYDROCHLORIDE 5 MG: 5 TABLET ORAL at 09:04

## 2018-04-16 RX ADMIN — RAMELTEON 8 MG: 8 TABLET, FILM COATED ORAL at 09:04

## 2018-04-16 RX ADMIN — CIPROFLOXACIN HYDROCHLORIDE 500 MG: 500 TABLET, FILM COATED ORAL at 08:04

## 2018-04-16 NOTE — ASSESSMENT & PLAN NOTE
New  · AVE after receiving bactrim  · Bactrim has since been stopped  · Cr on 4/15 was 1.6 and was treated with 1L of NS  · Today Cr 1.5, treated with 1L NS and repeat labs in AM

## 2018-04-16 NOTE — ASSESSMENT & PLAN NOTE
Evaluated on 4/16/18  · Patient started on iron therapy when hospitalized which will be continued  · Monitor H/H with twice weekly labs

## 2018-04-16 NOTE — ASSESSMENT & PLAN NOTE
Evaluated on 4/16/18  · With dyspnea while admitted, neg CXR and started on titropium  · Patient oxygenating well and report intermittent dyspnea on exertion   · F/U with PCP for w/u for underlying obstructive disease  · Continue titropium inhalation.  · Monitor respiratory status.

## 2018-04-16 NOTE — PROGRESS NOTES
[facility]  Department of Hospital Medicine  Progress Note    Patient Name: Ayaan Ricks  MRN: [id number]  Code Status: Full Code  Admission Date: 4/13/2018  Length of Stay: 3 days  Attending Physician: Rosa Damon MD  Primary Care Provider: Primary Doctor No    Subjective:     Principal Problem:PVD (peripheral vascular disease) with claudication    Chief Complaint/Reason for Admission: PVD (peripheral vascular disease) with claudication    History of Present Illness:  Patient is a 73 y.o. male with COPD, HTN, BPH who presents to SNF after left distal SFA to posterior tibial bypass on 4/9 by Dr. Mahmood to treat PVD with rest pain. The patient has been having intermittent tachycardia since surgery.  No albuterol given but started on spiriva; long smoking history but quit 6 months ago.  He denies SOB or wheezing. He complains of 10/10 pain to urethral meatus with khan catheter.  This started 2-3 days in the hospital after intermittent catheterization but has been worsening.  Khan placed on 4/12.  Oxycodone does relieve the pain but does not last the 6 hour duration.  He denies suprapubic pain or spasms. He complains of only soreness to his left leg and no pain.  The patient has been admitted to SNF for ongoing PT/OT due to insufficient progress to go home safely from the hospital.    Hospital Course:  The patient was admitted at [facility] from 4/9 to 4/13/2018.  4/13: Patient admitted to SNF for ongoing PT/OT following a hospitalization for PVD s/p bypass surgery.  4/16: Patient seen at bedside, reports khan discomfort is much improved today. Patient does report intermittent pain that is controlled with pain education. Labs reviewed.     Interval History: Patient seen at bedside, no acute events overnight.    Review of Systems   Constitutional: Negative for appetite change, chills, fatigue and fever.   HENT: Negative for trouble swallowing.    Respiratory: Negative for cough, chest  tightness, shortness of breath and wheezing.    Cardiovascular: Negative for chest pain, palpitations and leg swelling.   Gastrointestinal: Negative for abdominal pain, constipation, diarrhea and nausea.   Genitourinary: Positive for difficulty urinating. Negative for urgency.        Macias in place   Musculoskeletal: Negative for arthralgias and myalgias.        +intermittent left leg pain   Skin: Negative for rash.   Neurological: Negative for dizziness, weakness, light-headedness and headaches.   Psychiatric/Behavioral: Negative for sleep disturbance.     Scheduled Meds:   amLODIPine  5 mg Oral Daily    clopidogrel  75 mg Oral Daily    ferrous sulfate  325 mg Oral Daily    fluconazole  200 mg Oral Daily    heparin (porcine)  5,000 Units Subcutaneous Q8H    rosuvastatin  20 mg Oral Daily    senna-docusate 8.6-50 mg  1 tablet Oral BID    tamsulosin  0.8 mg Oral Daily    tiotropium  1 capsule Inhalation Daily    valsartan  160 mg Oral Daily     Continuous Infusions:   sodium chloride 0.9%       PRN Meds:.acetaminophen, calcium carbonate, oxyCODONE, ramelteon    Objective:     Vital Signs (Most Recent):  Temp: 97.4 °F (36.3 °C) (04/15/18 1926)  Pulse: 81 (04/16/18 0853)  Resp: 20 (04/16/18 0853)  BP: (!) 108/59 (04/16/18 0851)  SpO2: 99 % (04/16/18 0853) Vital Signs (24h Range):  Temp:  [97.4 °F (36.3 °C)] 97.4 °F (36.3 °C)  Pulse:  [] 81  Resp:  [18-20] 20  SpO2:  [99 %-100 %] 99 %  BP: (108-138)/(59-72) 108/59     Weight: 48.2 kg (106 lb 4.2 oz)  Body mass index is 15.25 kg/m².    Intake/Output Summary (Last 24 hours) at 04/16/18 1452  Last data filed at 04/16/18 0549   Gross per 24 hour   Intake              200 ml   Output              676 ml   Net             -476 ml      Physical Exam   Constitutional: He is oriented to person, place, and time. He appears well-developed and well-nourished. No distress.   Cardiovascular: Normal rate, regular rhythm and normal heart sounds.  Exam reveals no gallop  and no friction rub.    No murmur heard.  Pulmonary/Chest: Effort normal and breath sounds normal. No respiratory distress. He has no wheezes. He has no rales.   Abdominal: Soft. Bowel sounds are normal. He exhibits no distension. There is no tenderness.   Genitourinary:   Genitourinary Comments: Macias in place   Musculoskeletal: Normal range of motion. He exhibits no edema or tenderness.   Neurological: He is alert and oriented to person, place, and time.   Skin: Skin is warm and dry. No rash noted. He is not diaphoretic. No cyanosis. Nails show no clubbing.   Left leg incision closed with surgery glue, no erythema drainage or edema noted   Psychiatric: He has a normal mood and affect. His behavior is normal.       Significant Labs:     Recent Labs  Lab 04/11/18  0728 04/12/18  1654 04/16/18  0439   WBC 9.36 8.76 5.24   HGB 9.0* 9.8* 8.6*   HCT 27.6* 29.3* 26.2*    226 294       Recent Labs  Lab 04/09/18  1552 04/10/18  0430 04/11/18  0728 04/15/18  0532 04/16/18  0439    135*  135* 130* 131* 135*   K 4.4 4.3  4.3 4.1 3.9 4.7    104  104 99 98 102   CO2 22* 24  24 24 23 23   BUN 24* 22  22 18 32* 30*   CREATININE 1.3 1.2  1.2 1.1 1.6* 1.5*   CALCIUM 8.5* 8.5*  8.5* 9.2 8.8 8.9   PROT 6.9 6.2  6.2 6.8  --   --    BILITOT 0.2 0.3  0.3 0.5  --   --    ALKPHOS 112 91  91 98  --   --    ALT 12 10  10 8*  --   --    AST 20 17  17 20  --   --      Lab Results   Component Value Date    LABPROT 10.7 04/09/2018    ALBUMIN 2.9 (L) 04/11/2018     Lab Results   Component Value Date    CALCIUM 8.9 04/16/2018    PHOS 3.9 04/16/2018     Significant Imaging:n/a    Assessment/Plan:      * PVD (peripheral vascular disease) with claudication    Evaluated on 4/16/18  · S/p left SFA to posterior tibial bypass  · Continue medical therapy with rosuvastatin, plavix  · See plan below.        AVE (acute kidney injury)    New  · AVE after receiving bactrim  · Bactrim has since been stopped  · Cr on 4/15 was 1.6  and was treated with 1L of NS  · Today Cr 1.5, treated with 1L NS and repeat labs in AM        Malnutrition    Evaluated on 4/16/18  · dietitian consulted  · Continue cardiac diet        Ex-heavy cigarette smoker (20-39 per day)    Evaluated on 4/16/18  · With dyspnea while admitted, neg CXR and started on titropium  · Patient oxygenating well and report intermittent dyspnea on exertion   · F/U with PCP for w/u for underlying obstructive disease  · Continue titropium inhalation.  · Monitor respiratory status.        Infection associated with indwelling urinary catheter    Evaluated on 4/16/18  · Urine appears infected by U/A  · Start bactrim therapy but was discontinued for AVE and switched to cipro, cipro was discontinued and started on diflucan on yeast  · Patient will need khan change if culture + but will also do a voiding trial at that time to potentially prevent re-insertion.        Acute blood loss anemia    Evaluated on 4/16/18  · Patient started on iron therapy when hospitalized which will be continued  · Monitor H/H with twice weekly labs        Acute urinary obstruction    Evaluated on 4/16/18  · Remains with khan  · Continue therapy with tamsulosin which patient takes chronically  · F/U with urology as scheduled if remains with obstruction        Tachycardia    Evaluated on 4/16/18  · Sinus tach on ECG  · Likely due to pain from khan; treating UTI  · Monitor H/H and transfuse as indicated        S/P femoral-tibial bypass    Evaluated on 4/16/18  · Improving  · Patient with minimal pain to left leg  · Continue oxycodone prn   · Doppler checks every shift  · continue PT/OT to increase ambulation, ADL performance and endurance  · continue heparin for DVT prophylaxis  · continue fall precautions  · continue senokot-s to prevent constipation; hold for frequent or loose stooling  · F/u with Dr. Mahmood        Essential hypertension    Evaluated on 4/16/18  · Chronic and controlled  · Continue therapy  with amlodipine and valsartan  · will continue to monitor and adjust regimen as necessary          Future Appointments  Date Time Provider Department Center   4/18/2018 8:40 AM Jessica Claire PA-C [facility]  Department of [facility]  [facility]

## 2018-04-16 NOTE — SUBJECTIVE & OBJECTIVE
Hospital Course:  The patient was admitted at [facility] [address] from 4/9 to 4/13/2018.  4/13: Patient admitted to SNF for ongoing PT/OT following a hospitalization for PVD s/p bypass surgery.  4/16: Patient seen at bedside, reports khan discomfort is much improved today. Patient does report intermittent pain that is controlled with pain education. Labs reviewed.     Interval History: Patient seen at bedside, no acute events overnight.    Review of Systems   Constitutional: Negative for appetite change, chills, fatigue and fever.   HENT: Negative for trouble swallowing.    Respiratory: Negative for cough, chest tightness, shortness of breath and wheezing.    Cardiovascular: Negative for chest pain, palpitations and leg swelling.   Gastrointestinal: Negative for abdominal pain, constipation, diarrhea and nausea.   Genitourinary: Positive for difficulty urinating. Negative for urgency.        Khan in place   Musculoskeletal: Negative for arthralgias and myalgias.        +intermittent left leg pain   Skin: Negative for rash.   Neurological: Negative for dizziness, weakness, light-headedness and headaches.   Psychiatric/Behavioral: Negative for sleep disturbance.     Scheduled Meds:   amLODIPine  5 mg Oral Daily    clopidogrel  75 mg Oral Daily    ferrous sulfate  325 mg Oral Daily    fluconazole  200 mg Oral Daily    heparin (porcine)  5,000 Units Subcutaneous Q8H    rosuvastatin  20 mg Oral Daily    senna-docusate 8.6-50 mg  1 tablet Oral BID    tamsulosin  0.8 mg Oral Daily    tiotropium  1 capsule Inhalation Daily    valsartan  160 mg Oral Daily     Continuous Infusions:   sodium chloride 0.9%       PRN Meds:.acetaminophen, calcium carbonate, oxyCODONE, ramelteon    Objective:     Vital Signs (Most Recent):  Temp: 97.4 °F (36.3 °C) (04/15/18 1926)  Pulse: 81 (04/16/18 0853)  Resp: 20 (04/16/18 0853)  BP: (!) 108/59 (04/16/18 0851)  SpO2: 99 % (04/16/18 0853) Vital Signs (24h Range):  Temp:  [97.4 °F (36.3  °C)] 97.4 °F (36.3 °C)  Pulse:  [] 81  Resp:  [18-20] 20  SpO2:  [99 %-100 %] 99 %  BP: (108-138)/(59-72) 108/59     Weight: 48.2 kg (106 lb 4.2 oz)  Body mass index is 15.25 kg/m².    Intake/Output Summary (Last 24 hours) at 04/16/18 1452  Last data filed at 04/16/18 0549   Gross per 24 hour   Intake              200 ml   Output              676 ml   Net             -476 ml      Physical Exam   Constitutional: He is oriented to person, place, and time. He appears well-developed and well-nourished. No distress.   Cardiovascular: Normal rate, regular rhythm and normal heart sounds.  Exam reveals no gallop and no friction rub.    No murmur heard.  Pulmonary/Chest: Effort normal and breath sounds normal. No respiratory distress. He has no wheezes. He has no rales.   Abdominal: Soft. Bowel sounds are normal. He exhibits no distension. There is no tenderness.   Genitourinary:   Genitourinary Comments: Macias in place   Musculoskeletal: Normal range of motion. He exhibits no edema or tenderness.   Neurological: He is alert and oriented to person, place, and time.   Skin: Skin is warm and dry. No rash noted. He is not diaphoretic. No cyanosis. Nails show no clubbing.   Left leg incision closed with surgery glue, no erythema drainage or edema noted   Psychiatric: He has a normal mood and affect. His behavior is normal.       Significant Labs:     Recent Labs  Lab 04/11/18  0728 04/12/18  1654 04/16/18  0439   WBC 9.36 8.76 5.24   HGB 9.0* 9.8* 8.6*   HCT 27.6* 29.3* 26.2*    226 294       Recent Labs  Lab 04/09/18  1552 04/10/18  0430 04/11/18  0728 04/15/18  0532 04/16/18  0439    135*  135* 130* 131* 135*   K 4.4 4.3  4.3 4.1 3.9 4.7    104  104 99 98 102   CO2 22* 24  24 24 23 23   BUN 24* 22  22 18 32* 30*   CREATININE 1.3 1.2  1.2 1.1 1.6* 1.5*   CALCIUM 8.5* 8.5*  8.5* 9.2 8.8 8.9   PROT 6.9 6.2  6.2 6.8  --   --    BILITOT 0.2 0.3  0.3 0.5  --   --    ALKPHOS 112 91  91 98  --   --     ALT 12 10  10 8*  --   --    AST 20 17  17 20  --   --      Lab Results   Component Value Date    LABPROT 10.7 04/09/2018    ALBUMIN 2.9 (L) 04/11/2018     Lab Results   Component Value Date    CALCIUM 8.9 04/16/2018    PHOS 3.9 04/16/2018     Significant Imaging:n/a

## 2018-04-16 NOTE — PLAN OF CARE
Problem: Patient Care Overview  Goal: Plan of Care Review  Outcome: Ongoing (interventions implemented as appropriate)   04/16/18 0233   Coping/Psychosocial   Plan Of Care Reviewed With patient       Problem: Fall Risk (Adult)  Goal: Identify Related Risk Factors and Signs and Symptoms  Related risk factors and signs and symptoms are identified upon initiation of Human Response Clinical Practice Guideline (CPG)   Outcome: Ongoing (interventions implemented as appropriate)   04/16/18 0233   Fall Risk   Related Risk Factors (Fall Risk) homeostatic imbalance;impaired vision;gait/mobility problems

## 2018-04-16 NOTE — ASSESSMENT & PLAN NOTE
Evaluated on 4/16/18  · Remains with khan  · Continue therapy with tamsulosin which patient takes chronically  · F/U with urology as scheduled if remains with obstruction

## 2018-04-16 NOTE — ASSESSMENT & PLAN NOTE
Evaluated on 4/16/18  · Chronic and controlled  · Continue therapy with amlodipine and valsartan  · will continue to monitor and adjust regimen as necessary

## 2018-04-16 NOTE — ASSESSMENT & PLAN NOTE
Evaluated on 4/16/18  · Urine appears infected by U/A  · Start bactrim therapy but was discontinued for AVE and switched to cipro, cipro was discontinued and started on diflucan on yeast  · Patient will need khan change if culture + but will also do a voiding trial at that time to potentially prevent re-insertion.

## 2018-04-16 NOTE — PT/OT/SLP PROGRESS
Occupational Therapy  Treatment    Ayaan Ricks   MRN: [id number]   Admitting Diagnosis: PVD (peripheral vascular disease) with claudication    OT Date of Treatment: 04/16/18  Total Time (min): 45 min    Billable Minutes:  Therapeutic Activity 15 and Therapeutic Exercise 30    General Precautions: Standard, fall  Orthopedic Precautions: N/A  Braces: N/A         Subjective:  Communicated with pt prior to session.      Pain/Comfort  Pain Rating 1: 6/10  Location 1: penis  Pain Addressed 1: Pre-medicate for activity, Reposition  Pain Rating Post-Intervention 1: 6/10    Objective:  Patient found with: khan catheter (supine in bed)    Functional Status:  MDS G  Bed Mobility Functional Status: Sup supine to sit  Transfer Functional Status: CGA from bed to w/c no AD  Locomotion Off Unit Functional Status: total(A)-w/c mobility             OT Exercises: UE Ergometer 15 min to increase functional endurance for ADLs  Rickshaw 15# 15 x 4  Rowing 15# 15 x 4    Additional Treatment:  Biceps curls 10# 15 x 4    Patient left up in chair with all lines intact and call button in reach    ASSESSMENT:  Pt tolerated tx and demonstrated increased indep w;tih t/fs and functional endurance. Pt cont to benefit from OT to address limitations and increase functional indep and safety to return to OF    Rehab identified problem list/impairments: weakness, impaired endurance, impaired self care skills, impaired functional mobilty, gait instability, impaired balance, visual deficits, decreased lower extremity function, decreased safety awareness, pain, impaired coordination, impaired skin, edema    Rehab potential is good    Activity tolerance: Good    Discharge recommendations: home with home health     Barriers to discharge: None     Equipment recommendations: bedside commode, bath bench, walker, rolling     GOALS:    Occupational Therapy Goals        Problem: Occupational Therapy Goal    Goal Priority Disciplines Outcome Interventions    Occupational Therapy Goal     OT, PT/OT Ongoing (interventions implemented as appropriate)    Description:  Goals to be met by: 14 days     Patient will increase functional independence with ADLs by performing:    UE Dressing with Modified [place].  LE Dressing with Modified [place] using AD as needed.  Grooming while standing at sink with Modified [place].  Toileting from toilet with Modified [place] for hygiene and clothing management.   Bathing from  edge of bed with Modified [place].  Toilet transfer to toilet with Modified [place].  Upper extremity exercise program x15 reps per handout, with supervision to improve UE strength/endurance to facilitate func mobility  Stand for 10 minutes with mod I during functional activity in order to perform safe standing ADLs/iADLs                      Plan:  Patient to be seen  (5-6x/week) to address the above listed problems via self-care/home management, therapeutic activities, therapeutic exercises  Plan of Care expires: 05/14/18  Plan of Care reviewed with: patient    CORTES Prasad  04/16/2018

## 2018-04-16 NOTE — CLINICAL REVIEW
Clinical Pharmacy Chart Review Note      Admit Date: 4/13/2018   LOS: 3 days       Ayaan Ricks is a 73 y.o. male admitted to SNF for PT/OT after hospitalization for PVD with claudication, S/P bypass surgery.    Active Hospital Problems    Diagnosis  POA    *PVD (peripheral vascular disease) with claudication [I73.9]  Yes    S/P femoral-tibial bypass [Z98.890]  Not Applicable    Tachycardia [R00.0]  Yes    Acute urinary obstruction [N13.9]  Yes    Acute blood loss anemia [D62]  Yes    Infection associated with indwelling urinary catheter [T83.511A]  Yes    Ex-heavy cigarette smoker (20-39 per day) [Z87.891]  Not Applicable    Malnutrition [E46]  Unknown    Essential hypertension [I10]  Yes      Resolved Hospital Problems    Diagnosis Date Resolved POA   No resolved problems to display.     Review of patient's allergies indicates:   Allergen Reactions    Shellfish containing products Nausea And Vomiting     PT REPORTS THAT HE HAS NAUSEA AND VOMITING AFTER EATING SHELLFISH. DENIES SOB, FACIAL, MOUTH OR TONGUE SWELLING     Patient Active Problem List    Diagnosis Date Noted    S/P femoral-tibial bypass 04/14/2018    Tachycardia 04/14/2018    Acute urinary obstruction 04/14/2018    Acute blood loss anemia 04/14/2018    Infection associated with indwelling urinary catheter 04/14/2018    Ex-heavy cigarette smoker (20-39 per day) 04/14/2018    Malnutrition 04/14/2018    Abscess 04/04/2018    PVD (peripheral vascular disease) with claudication 04/04/2018    Encounter for other preprocedural examination     Peripheral arterial disease 03/29/2018    Atherosclerotic peripheral vascular disease with rest pain 03/20/2018    Essential hypertension 03/20/2018       Scheduled Meds:    amLODIPine  5 mg Oral Daily    ciprofloxacin HCl  500 mg Oral Q12H    clopidogrel  75 mg Oral Daily    ferrous sulfate  325 mg Oral Daily    heparin (porcine)  5,000 Units Subcutaneous Q8H    rosuvastatin  20 mg Oral  Daily    senna-docusate 8.6-50 mg  1 tablet Oral BID    tamsulosin  0.8 mg Oral Daily    tiotropium  1 capsule Inhalation Daily    valsartan  160 mg Oral Daily     Continuous Infusions:   PRN Meds: acetaminophen, calcium carbonate, oxyCODONE, ramelteon    OBJECTIVE:     Vital Signs (Last 24H)  Temp:  [97.4 °F (36.3 °C)]   Pulse:  []   Resp:  [18-20]   BP: (108-138)/(59-72)   SpO2:  [99 %-100 %]     Laboratory:  CBC:   Recent Labs  Lab 04/10/18  0430 04/11/18 0728 04/12/18  1654   WBC 9.23  9.23 9.36 8.76   RBC 2.81*  2.81* 3.01* 3.19*   HGB 8.6*  8.6* 9.0* 9.8*   HCT 25.7*  25.7* 27.6* 29.3*     227 246 226   MCV 92  92 92 92   MCH 30.6  30.6 29.9 30.7   MCHC 33.5  33.5 32.6 33.4     BMP:   Recent Labs  Lab 04/09/18  1552 04/10/18  0430 04/11/18  0728 04/15/18  0532   * 105  105 97 117*    135*  135* 130* 131*   K 4.4 4.3  4.3 4.1 3.9    104  104 99 98   CO2 22* 24  24 24 23   BUN 24* 22  22 18 32*   CREATININE 1.3 1.2  1.2 1.1 1.6*   CALCIUM 8.5* 8.5*  8.5* 9.2 8.8   MG 1.8 1.4*  1.4* 1.7  --      CMP:   Recent Labs  Lab 04/09/18  1552 04/10/18  0430 04/11/18  0728 04/15/18  0532   * 105  105 97 117*   CALCIUM 8.5* 8.5*  8.5* 9.2 8.8   ALBUMIN 3.1* 2.8*  2.8* 2.9*  --    PROT 6.9 6.2  6.2 6.8  --     135*  135* 130* 131*   K 4.4 4.3  4.3 4.1 3.9   CO2 22* 24  24 24 23    104  104 99 98   BUN 24* 22  22 18 32*   CREATININE 1.3 1.2  1.2 1.1 1.6*   ALKPHOS 112 91  91 98  --    ALT 12 10  10 8*  --    AST 20 17  17 20  --    BILITOT 0.2 0.3  0.3 0.5  --      LFTs:   Recent Labs  Lab 04/09/18  1552 04/10/18  0430 04/11/18  0728   ALT 12 10  10 8*   AST 20 17  17 20   ALKPHOS 112 91  91 98   BILITOT 0.2 0.3  0.3 0.5   PROT 6.9 6.2  6.2 6.8   ALBUMIN 3.1* 2.8*  2.8* 2.9*     Coagulation:   Recent Labs  Lab 04/09/18  1552   INR 1.0   APTT 25.1     Cardiac markers: No results for input(s): CKMB, TROPONINT, MYOGLOBIN in the last 168  hours.  ABGs:   Recent Labs  Lab 04/09/18  1152 04/09/18  1338   PH 7.465* 7.456*   PCO2 29.5* 31.7*   PO2 280* 304*   HCO3 21.2* 22.3*   POCSATURATED 100 100   BE -2 -2     Microbiology Results (last 7 days)     Procedure Component Value Units Date/Time    Urine culture [[id number]] Collected:  04/14/18 1135    Order Status:  Completed Specimen:  Urine from Urine, Catheterized Updated:  04/15/18 2118     Urine Culture, Routine --     YEAST   10,000 - 49,999 cfu/ml  Identification pending          Specimen (12h ago through future)    None          Recent Labs  Lab 04/10/18  1745 04/14/18  1135   COLORU Yellow Yellow   SPECGRAV 1.015 1.020   PHUR 7.0 5.0   PROTEINUA Negative 1+*   BACTERIA  --  Rare   NITRITE Negative Negative   LEUKOCYTESUR Negative 3+*   UROBILINOGEN Negative Negative   HYALINECASTS  --  7*     Others: No results for input(s): KVSYKBFT45JJ, TSH, T4FREE, LABLIPI in the last 168 hours.    Invalid input(s): A1C      ASSESSMENT/PLAN:     Active Hospital Problems    Diagnosis    *PVD (peripheral vascular disease) with claudication   --Clopidogrel 75 mg daily  --Rosuvastatin 20 mg daily      S/P femoral-tibial bypass   --PT/OT: Oxycodone 5 mg q4hrs prn moderate/severe pain  --bowel regimen for constipation; hold for loose or frequent stools: Senna-docusate twice daily  --DVT prophylaxis: Heparin 5000u q8hrs      Tachycardia   --Sinus tach on ECG  --Likely due to pain from khan, treating UTI      Acute urinary obstruction  --Remains with khan   --Tamsulosin 0.8 mg daily  --F/U with urology      Acute blood loss anemia   **Monitor CBC and iron  --Ferrous sulfate 325 mg daily  Lab Results   Component Value Date    WBC 8.76 04/12/2018    HGB 9.8 (L) 04/12/2018    HCT 29.3 (L) 04/12/2018    MCV 92 04/12/2018     04/12/2018         Infection associated with indwelling urinary catheter   --Yeast growth noted, No bacteria growth found  --Fluconazole 200 mg daily      Ex-heavy cigarette smoker  (20-39 per day)   --With dyspnea while admitted, neg CXR  --Started Tiotropium 18mcg per inhalation daily  --F/U with PCP for w/u for underlying obstructive disease      Essential hypertension   **Monitor BP and pulse  --Amlodipine 5 mg daily  --Valsartan 160 mg daily (monitor BMP)  BP Readings from Last 3 Encounters:   04/16/18 (!) 108/59   04/13/18 108/65   04/04/18 (!) 124/58     Pulse Readings from Last 3 Encounters:   04/16/18 81   04/13/18 (!) 123   04/04/18 66               I have reviewed the medications in compliance with CMS Regulation F329 of the IVY Appendix PP.      Elaina Vicente, Pharm. D.  Clinical Pharmacist  [facility]

## 2018-04-16 NOTE — PT/OT/SLP DISCHARGE
Occupational Therapy Discharge Summary    Ayaan Ricks  MRN: [id number]   Principal Problem: Atherosclerotic peripheral vascular disease with rest pain      Patient Discharged from acute Occupational Therapy on 4/16/18.  Please refer to prior OT note dated 4/12/18 for functional status.    Assessment:      Patient has not met goals.    Objective:     GOALS:    Occupational Therapy Goals     Not on file          Multidisciplinary Problems (Resolved)        Problem: Occupational Therapy Goal    Goal Priority Disciplines Outcome Interventions   Occupational Therapy Goal   (Resolved)     OT, PT/OT Outcome(s) achieved    Description:  Goals to be met by: 4/10/2018     Patient will increase functional independence with ADLs by performing:    UE Dressing with [place].  LE Dressing with Modified [place].  Grooming while standing with Modified [place].  Toileting from toilet with Modified [place] for hygiene and clothing management.   Bathing from  edge of bed with Minimal Assistance.  Supine to sit with Modified [place].  Stand pivot transfers with [place].  Toilet transfer to toilet with Modified [place].                       Reasons for Discontinuation of Therapy Services  Transfer to alternate level of care.      Plan:     Patient Discharged to: Skilled Nursing Facility    Radha Lu, OT  4/16/2018

## 2018-04-16 NOTE — DISCHARGE SUMMARY
[facility]  VASCULAR Surgery  Discharge Summary      Patient Name: Ayaan Ricks  MRN: [id number]  Admission Date: 4/9/2018  Hospital Length of Stay: 4 days  Discharge Date and Time: 4/13/2018 10:14 PM  Attending Physician: BARTOLOME Brower MD  Discharging Provider: Nolan Bose MD  Primary Care Provider: Primary Doctor No     HPI: A 73-year-old male with a history of short   distance, less than half a block left calf claudication and classic ischemic   rest pain of the left foot, which has all been present for approximately 10   months.  He has had no recent exacerbation.    Procedure(s) (LRB):  [id number] (Left)     Hospital Course: Patient tolerated procedure well. His recovery was complicated by urinary rentention and rapid decompensation of his strength and mobility. Post op he was voiding small amounts, approx 50 mL 4-5 x/day. A bladder scan was performed which showed he had 500+ of urine retained so a khan was placed POD 1. He had a voiding trial POD 4 but failed again so he was sent out with a khan and follow up with urology outpatient. He also was having difficulty ambulating as the days went on due to pain at first, and later exertional dyspnea with tachycardia when he would ambulate. EKG was unremarkable. PT/OT recommended SNF at that time due to his living at home alone and not being able to care for himself. On POD 4 he was tolerating diet, his pain was well controlled, his bypass was still patent with a 2+ palpable PT pulse, he was having bowel function, and he had a khan in place. He was discharged to SNF for PT/OT, and he is to follow up with Dr. Brower.     Consults:   Consults         Status Ordering Provider     Inpatient consult to PICC team (NIAS)  Once     Provider:  (Not yet assigned)    Completed BARTOLOME BROWER III          Significant Diagnostic Studies: Labs:   CMP   Recent Labs  Lab 04/15/18  0532   *   K 3.9   CL 98   CO2 23   *  "  BUN 32*   CREATININE 1.6*   CALCIUM 8.8   ANIONGAP 10   ESTGFRAFRICA 48.7*   EGFRNONAA 42.1*    and CBC No results for input(s): WBC, HGB, HCT, PLT in the last 48 hours.    Pending Diagnostic Studies:     None        Final Active Diagnoses:    Diagnosis Date Noted POA    PRINCIPAL PROBLEM:  Atherosclerotic peripheral vascular disease with rest pain [I70.229] 03/20/2018 Yes    PVD (peripheral vascular disease) with claudication [I73.9] 04/04/2018 Yes    Essential hypertension [I10] 03/20/2018 Yes      Problems Resolved During this Admission:    Diagnosis Date Noted Date Resolved POA      Discharged Condition: good    Disposition: Skilled Nursing Facility    Follow Up:  Follow-up Information     Please follow up.    Why:  [facility] APPOINTMENT TOMORROW APRIL 12 TH AT 11:30 AM DR ALLIE PATTERSON           Please follow up.    Why:  [facility] follow up appointment on April 13 at 2:30pm -see instructions           W Nolan Mahmood Iii, MD In 2 weeks.    Specialty:  Vascular Surgery  Why:  With ABIs and BLE Art US  [facility] information:  [facility] [id number] [phone]                 Patient Instructions:     WALKER FOR HOME USE   Order Specific Question Answer Comments   Type of Walker: Adult (5'4"-6'6")    With wheels? Yes    Height: 5' 10" (1.778 m)    Weight: 54.4 kg (120 lb)    Length of need (1-99 months): 99    Does patient have medical equipment at home? none    Please check all that apply: Patient is unable to safely ambulate without equipment.    Please check all that apply: Patient's condition impairs ambulation.      COMMODE FOR HOME USE   Order Specific Question Answer Comments   Type: Standard    Height: 5' 10" (1.778 m)    Weight: 54.4 kg (120 lb)    Does patient have medical equipment at home? none    Length of need (1-99 months): 99      WALKER FOR HOME USE   Order Specific Question Answer Comments   Type of Walker: Adult (5'4"-6'6")    With wheels? Yes    Height: 5' 10" " "(1.778 m)    Weight: 54.4 kg (120 lb)    Length of need (1-99 months): 99    Does patient have medical equipment at home? none    Please check all that apply: Patient's condition impairs ambulation.    Please check all that apply: Patient is unable to safely ambulate without equipment.      COMMODE FOR HOME USE   Order Specific Question Answer Comments   Type: Standard    Height: 5' 10" (1.778 m)    Weight: 54.4 kg (120 lb)    Does patient have medical equipment at home? none    Length of need (1-99 months): 99      VAS US Ankle Brachial Indices Resting   Standing Status: Future  Standing Exp. Date: 04/12/19   Order Comments: Please include PVRs     VAS US Arterial Leg Left   Standing Status: Future  Standing Exp. Date: 04/12/19     VAS US Ankle Brachial Indices Resting   Standing Status: Future  Standing Exp. Date: 04/13/19   Order Comments: Please include PVR's     VAS US Arterial Legs Bilateral   Standing Status: Future  Standing Exp. Date: 04/13/19   Order Comments: Right leg for r/o aneurysm, Left leg to evaluate bypass       Medications:  Transfer Medications (for Discharge Readmit only):   No current facility-administered medications for this encounter.      No current outpatient prescriptions on file.     Facility-Administered Medications Ordered in Other Encounters   Medication Dose Route Frequency Provider Last Rate Last Dose    acetaminophen tablet 650 mg  650 mg Oral Q6H PRN Nolan Bose MD   650 mg at 04/16/18 0445    amLODIPine tablet 5 mg  5 mg Oral Daily Rosa Damon MD        calcium carbonate 200 mg calcium (500 mg) chewable tablet 500 mg  500 mg Oral BID PRN Nolan Bose MD        ciprofloxacin HCl tablet 500 mg  500 mg Oral Q12H Rosa Damon MD   500 mg at 04/16/18 0853    clopidogrel tablet 75 mg  75 mg Oral Daily Nolan Bose MD   75 mg at 04/16/18 0853    ferrous sulfate EC tablet 325 mg  325 mg Oral Daily Nolan Bose MD   325 mg at 04/16/18 0853    " heparin (porcine) injection 5,000 Units  5,000 Units Subcutaneous Q8H Rosa Damon MD   5,000 Units at 04/16/18 0547    oxyCODONE immediate release tablet 5 mg  5 mg Oral Q4H PRN Rosa Damon MD   5 mg at 04/16/18 0853    ramelteon tablet 8 mg  8 mg Oral Nightly PRN Nolan Bose MD   8 mg at 04/15/18 2105    rosuvastatin tablet 20 mg  20 mg Oral Daily Nolan Bose MD   20 mg at 04/16/18 0853    senna-docusate 8.6-50 mg per tablet 1 tablet  1 tablet Oral BID Nolan Bose MD   1 tablet at 04/16/18 0852    tamsulosin 24 hr capsule 0.8 mg  0.8 mg Oral Daily Nolan Bose MD   0.8 mg at 04/16/18 0852    tiotropium inhalation capsule 18 mcg  1 capsule Inhalation Daily Nolan Bose MD   18 mcg at 04/16/18 0853    valsartan tablet 160 mg  160 mg Oral Daily MD Nolan Hathaway MD  General Surgery  [facility]

## 2018-04-16 NOTE — PLAN OF CARE
Problem: Fall Risk (Adult)  Goal: Absence of Falls  Patient will demonstrate the desired outcomes by discharge/transition of care.   Outcome: Ongoing (interventions implemented as appropriate)  Pt. had  no falls at this time.will continue to monitor pt.

## 2018-04-16 NOTE — PLAN OF CARE
Problem: Occupational Therapy Goal  Goal: Occupational Therapy Goal  Goals to be met by: 4/10/2018     Patient will increase functional independence with ADLs by performing:    UE Dressing with [place].  LE Dressing with Modified [place].  Grooming while standing with Modified [place].  Toileting from toilet with Modified [place] for hygiene and clothing management.   Bathing from  edge of bed with Minimal Assistance.  Supine to sit with Modified [place].  Stand pivot transfers with [place].  Toilet transfer to toilet with Modified [place].     Outcome: Outcome(s) achieved Date Met: 04/16/18  Goals not met; pt d/c from hospital

## 2018-04-16 NOTE — PLAN OF CARE
Problem: Occupational Therapy Goal  Goal: Occupational Therapy Goal  Goals to be met by: 14 days     Patient will increase functional independence with ADLs by performing:    UE Dressing with Modified [place].  LE Dressing with Modified [place] using AD as needed.  Grooming while standing at sink with Modified [place].  Toileting from toilet with Modified [place] for hygiene and clothing management.   Bathing from  edge of bed with Modified [place].  Toilet transfer to toilet with Modified [place].  Upper extremity exercise program x15 reps per handout, with supervision to improve UE strength/endurance to facilitate func mobility  Stand for 10 minutes with mod I during functional activity in order to perform safe standing ADLs/iADLs     Outcome: Ongoing (interventions implemented as appropriate)  Progressing. CORTES Prasad  4/16/2018

## 2018-04-16 NOTE — ASSESSMENT & PLAN NOTE
Evaluated on 4/16/18  · Improving  · Patient with minimal pain to left leg  · Continue oxycodone prn   · Doppler checks every shift  · continue PT/OT to increase ambulation, ADL performance and endurance  · continue heparin for DVT prophylaxis  · continue fall precautions  · continue senokot-s to prevent constipation; hold for frequent or loose stooling  · F/u with Dr. Mahmood

## 2018-04-16 NOTE — PT/OT/SLP PROGRESS
Physical Therapy  Treatment    Ayaan Ricks   MRN: [id number]   Admitting Diagnosis: PVD (peripheral vascular disease) with claudication    PT Received On: 04/16/18  Total Time (min): 60       Billable Minutes:  Gait Training 25, Therapeutic Activity 20 and Therapeutic Exercise 15    Treatment Type: Treatment  PT/PTA: PTA     PTA Visit Number: 1       General Precautions: Standard,    Orthopedic Precautions:     Braces:      Do you have any cultural, spiritual, Jewish conflicts, given your current situation?: none    Subjective:  Communicated with NSG prior to session.  Pt agreeable to therapy.     Pain/Comfort  Pain Rating 1: 6/10  Location 1: penis  Pain Addressed 1: Pre-medicate for activity, Reposition, Distraction  Pain Rating Post-Intervention 1: 6/10    Objective:  Patient found supine in bed with Patient found with: khan catheter       Functional Status:  MDS G  Bed Mobility Functional Status: mod(I) - (I)  Transfer Functional Status: CGA-Min (A)  Walk in Room Functional Status: CGA-Min (A)  Walk in Corridor Functional Status: CGA-Min (A)  Locomotion on Unit Functional Status: S-SBA  Moving from seated to standing position: Not steady, but able to stabilize without staff assistance  Walking (with assistive device if used): Not steady, only able to stabilize with staff assistance  Turning around and facing the opposite direction while walking: Not steady, only able to stabilize with staff assistance  Moving on and off the toilet: Not steady, only able to stabilize with staff assistance  Surface-to-surface transfer (transfer between bed and chair or wheelchair): Not steady, only able to stabilize with staff assistance          AM-PAC 6 CLICK MOBILITY  Total Score:19    Bed Mobility:  Sit>Supine: Mod-I  Supine>Sit: Mod-I    Transfers:  Sit<>Stand: CGA with and without AD, from bed, toilet, and w/c.   Stand Pivot Transfer: CGA without AD, bed <> w/c and w/c <> toilet.     Gait:  Amb: Pt ambulated 140 ft x  3 trials with RW and CGA/SBA. Pt with flexed forward posture and step-to pattern that pt can correct when given V/T cues. Pt requires occasional reinforcement cueing to assure pt continues with improved posture and gait pattern. Pt ambulates with quick liliya and was cued to slow down for focus on gait pattern and mechanics. Wheelchair in tow behind. Seated rest between trials. Pt reports only mild fatigue.     Advanced Gait:  Stairs: 4 x 4 trials with BHR and CGA. Pt with seated rest between trials 2 and 3. Pt cued for reciprocal pattern and safe foot placement.     Wheelchair Mobility:  Patient propels w/c 100 ft with BUE and supervision.      Therex:  Seated BLE therex: AP, LAQ, Seated Marching, GS, Hip ABD/ADD x 30 reps. Demonstration and verbal cues to improve technique and quality.   Pt provided copy of HEP booklet for LE therex.     Balance:  Pt requires CGA for balance with RW during standing and ambulation.     Additional Treatment:  Pt assisted to bathroom for toileting. Pt requires CGA for t/f to toilet and toilets with CGA for steadying balance.     Patient left supine with call button in reach.    Assessment:  Ayaan Ricks is a 73 y.o. male with a medical diagnosis of PVD (peripheral vascular disease) with claudication.  Pt tolerated tx well with focus on gait training, therex, and transfers. Pt progressing well tolerating gait training and therex this session d/t decrease in pain from khan catheter site. Pt limited by fatigue. Pt will continue to benefit from skilled therapy to improve impairments listed below.     Rehab identified problem list/impairments: weakness, impaired endurance, gait instability, impaired balance, pain, edema    Rehab potential is good.    Activity tolerance: Good    Discharge recommendations: home health PT     Barriers to discharge: None    Equipment recommendations: bath bench, walker, rolling     GOALS:    Physical Therapy Goals        Problem: Physical Therapy Goal     Goal Priority Disciplines Outcome Goal Variances Interventions   Physical Therapy Goal     PT/OT, PT Ongoing (interventions implemented as appropriate)     Description:  Goals to be met by: 14 days     Patient will increase functional independence with mobility by performin. Sit to stand transfer with Modified [place]  2. Bed to chair transfer with Modified [place] using Rolling Walker  3. Gait  x 150 feet with Modified [place] using Rolling Walker.   4. Wheelchair propulsion x300 feet with Modified [place] using bilateral uppper extremities  5. Ascend/descend 4 stair with right Handrails Supervision   6. Ascend/Descend 4 inch curb step with Supervision using Rolling Walker.  7. Lower extremity exercise program x20 reps per handout, with independence                      PLAN:    Patient to be seen  (5-6x/week)  to address the above listed problems via gait training, therapeutic activities, therapeutic exercises, neuromuscular re-education, wheelchair management/training  Plan of Care expires: 18  Plan of Care reviewed with: patient    Pa Sorenson, PTA  2018

## 2018-04-16 NOTE — PLAN OF CARE
Problem: Physical Therapy Goal  Goal: Physical Therapy Goal  Goals to be met by: 14 days     Patient will increase functional independence with mobility by performin. Sit to stand transfer with Modified [place]  2. Bed to chair transfer with Modified [place] using Rolling Walker  3. Gait  x 150 feet with Modified [place] using Rolling Walker.   4. Wheelchair propulsion x300 feet with Modified [place] using bilateral uppper extremities  5. Ascend/descend 4 stair with right Handrails Supervision   6. Ascend/Descend 4 inch curb step with Supervision using Rolling Walker.  7. Lower extremity exercise program x20 reps per handout, with independence     Outcome: Ongoing (interventions implemented as appropriate)  Goals remain appropriate.
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
## 2018-04-16 NOTE — ASSESSMENT & PLAN NOTE
Evaluated on 4/16/18  · Sinus tach on ECG  · Likely due to pain from khan; treating UTI  · Monitor H/H and transfuse as indicated

## 2018-04-16 NOTE — ASSESSMENT & PLAN NOTE
Evaluated on 4/16/18  · S/p left SFA to posterior tibial bypass  · Continue medical therapy with rosuvastatin, plavix  · See plan below.

## 2018-04-17 LAB
ANION GAP SERPL CALC-SCNC: 7 MMOL/L
BUN SERPL-MCNC: 20 MG/DL
CALCIUM SERPL-MCNC: 8.5 MG/DL
CHLORIDE SERPL-SCNC: 102 MMOL/L
CO2 SERPL-SCNC: 23 MMOL/L
CREAT SERPL-MCNC: 1.1 MG/DL
EST. GFR  (AFRICAN AMERICAN): >60 ML/MIN/1.73 M^2
EST. GFR  (NON AFRICAN AMERICAN): >60 ML/MIN/1.73 M^2
GLUCOSE SERPL-MCNC: 82 MG/DL
POTASSIUM SERPL-SCNC: 4.5 MMOL/L
SODIUM SERPL-SCNC: 132 MMOL/L

## 2018-04-17 PROCEDURE — 97535 SELF CARE MNGMENT TRAINING: CPT

## 2018-04-17 PROCEDURE — 80048 BASIC METABOLIC PNL TOTAL CA: CPT

## 2018-04-17 PROCEDURE — 25000242 PHARM REV CODE 250 ALT 637 W/ HCPCS: Performed by: STUDENT IN AN ORGANIZED HEALTH CARE EDUCATION/TRAINING PROGRAM

## 2018-04-17 PROCEDURE — 63600175 PHARM REV CODE 636 W HCPCS: Performed by: INTERNAL MEDICINE

## 2018-04-17 PROCEDURE — 25000003 PHARM REV CODE 250: Performed by: STUDENT IN AN ORGANIZED HEALTH CARE EDUCATION/TRAINING PROGRAM

## 2018-04-17 PROCEDURE — 25000003 PHARM REV CODE 250: Performed by: INTERNAL MEDICINE

## 2018-04-17 PROCEDURE — 12000000 HC SNF SEMI-PRIVATE ROOM

## 2018-04-17 PROCEDURE — 36415 COLL VENOUS BLD VENIPUNCTURE: CPT

## 2018-04-17 RX ADMIN — OXYCODONE HYDROCHLORIDE 5 MG: 5 TABLET ORAL at 03:04

## 2018-04-17 RX ADMIN — VALSARTAN 160 MG: 80 TABLET ORAL at 08:04

## 2018-04-17 RX ADMIN — FLUCONAZOLE 200 MG: 200 TABLET ORAL at 08:04

## 2018-04-17 RX ADMIN — ROSUVASTATIN CALCIUM 20 MG: 10 TABLET, FILM COATED ORAL at 08:04

## 2018-04-17 RX ADMIN — OXYCODONE HYDROCHLORIDE 5 MG: 5 TABLET ORAL at 09:04

## 2018-04-17 RX ADMIN — RAMELTEON 8 MG: 8 TABLET, FILM COATED ORAL at 10:04

## 2018-04-17 RX ADMIN — TAMSULOSIN HYDROCHLORIDE 0.8 MG: 0.4 CAPSULE ORAL at 08:04

## 2018-04-17 RX ADMIN — OXYCODONE HYDROCHLORIDE 5 MG: 5 TABLET ORAL at 10:04

## 2018-04-17 RX ADMIN — STANDARDIZED SENNA CONCENTRATE AND DOCUSATE SODIUM 1 TABLET: 8.6; 5 TABLET, FILM COATED ORAL at 10:04

## 2018-04-17 RX ADMIN — OXYCODONE HYDROCHLORIDE 5 MG: 5 TABLET ORAL at 05:04

## 2018-04-17 RX ADMIN — HEPARIN SODIUM 5000 UNITS: 5000 INJECTION, SOLUTION INTRAVENOUS; SUBCUTANEOUS at 05:04

## 2018-04-17 RX ADMIN — AMLODIPINE BESYLATE 5 MG: 5 TABLET ORAL at 08:04

## 2018-04-17 RX ADMIN — TIOTROPIUM BROMIDE 18 MCG: 18 CAPSULE ORAL; RESPIRATORY (INHALATION) at 08:04

## 2018-04-17 RX ADMIN — HEPARIN SODIUM 5000 UNITS: 5000 INJECTION, SOLUTION INTRAVENOUS; SUBCUTANEOUS at 02:04

## 2018-04-17 RX ADMIN — FERROUS SULFATE TAB EC 325 MG (65 MG FE EQUIVALENT) 325 MG: 325 (65 FE) TABLET DELAYED RESPONSE at 08:04

## 2018-04-17 RX ADMIN — CLOPIDOGREL BISULFATE 75 MG: 75 TABLET ORAL at 08:04

## 2018-04-17 RX ADMIN — STANDARDIZED SENNA CONCENTRATE AND DOCUSATE SODIUM 1 TABLET: 8.6; 5 TABLET, FILM COATED ORAL at 08:04

## 2018-04-17 NOTE — PLAN OF CARE
Problem: Fall Risk (Adult)  Goal: Identify Related Risk Factors and Signs and Symptoms  Related risk factors and signs and symptoms are identified upon initiation of Human [employer] Clinical Practice Guideline (CPG)    04/17/18 0322   Fall Risk   Related Risk Factors (Fall Risk) age-related changes;fear of falling;gait/mobility problems;polypharmacy   Signs and Symptoms (Fall Risk) presence of risk factors

## 2018-04-17 NOTE — PT/OT/SLP PROGRESS
Occupational Therapy  Treatment    Ayaan Ricks   MRN: [id number]   Admitting Diagnosis: PVD (peripheral vascular disease) with claudication    OT Date of Treatment: 04/17/18  Total Time (min): 53 min    Billable Minutes:  Self Care/Home Management 53    General Precautions: Standard, fall  Orthopedic Precautions: N/A  Braces: N/A         Subjective:  Communicated with nsg prior to session.  This khan is hurting its pulling    Pain/Comfort  Pain Rating 1: 6/10  Location 1: penis  Pain Addressed 1: Pre-medicate for activity, Reposition, Nurse notified  Pain Rating Post-Intervention 1: 3/10    Objective:  Patient found with: khan catheter    Functional Status:  MDS G  Bed Mobility Functional Status: mod(I) - (I) supine <> sit  Transfer Functional Status: CGA-Min (A) from EOB <> 3n1 with cues and SPT  Dressing Functional Status: 3:mod(A)-Max(A) from EOB level with LB dressing for cath insert for LLE and (A) in stance with over hips management   Eating Functional Status: mod(I) - (I)  Toilet Use Functional Status: CGA-Min (A) from 3n1 level with cleaning asepects  Personal Hygiene Functional Status: S-SBA  Bathing Functional Status: CGA-Min (A) from EOB level with standing phase  Moving from seated to standing position: Not steady, but able to stabilize without staff assistance  Moving on and off the toilet: Not steady, only able to stabilize with staff assistance  Surface-to-surface transfer (transfer between bed and chair or wheelchair): Not steady, only able to stabilize with staff assistance           [facility] 6 Click:  [facility] Total Score: 17      Patient left seated EOB wth breakfast with all lines intact and call button in reach    ASSESSMENT:  Ayaan Ricks is a 73 y.o. male with a medical diagnosis of PVD (peripheral vascular disease) with claudication Pt. participated well with session on this day.Pt. With complaint of pain and discomfort around insertion area of catheter on this day. NSG notifiedPt. Pt  demos physical deficits with balance  functional mobility, UB strength, endurance  level of functional indep with daily tasks and activities and selfcare skills .Pt. Will continue to benefit from continued OT to progress towards goals  .    Rehab identified problem list/impairments: weakness, impaired endurance, impaired self care skills, impaired functional mobilty, gait instability, impaired balance, visual deficits, decreased lower extremity function, decreased safety awareness, pain, impaired coordination, impaired skin, edema    Rehab potential is fair    Activity tolerance: Fair    Discharge recommendations: home with home health     Barriers to discharge: None     Equipment recommendations: bedside commode, bath bench, walker, rolling     GOALS:    Occupational Therapy Goals        Problem: Occupational Therapy Goal    Goal Priority Disciplines Outcome Interventions   Occupational Therapy Goal     OT, PT/OT Ongoing (interventions implemented as appropriate)    Description:  Goals to be met by: 14 days     Patient will increase functional independence with ADLs by performing:    UE Dressing with Modified [place].  LE Dressing with Modified [place] using AD as needed.  Grooming while standing at sink with Modified [place].  Toileting from toilet with Modified [place] for hygiene and clothing management.   Bathing from  edge of bed with Modified [place].  Toilet transfer to toilet with Modified [place].  Upper extremity exercise program x15 reps per handout, with supervision to improve UE strength/endurance to facilitate func mobility  Stand for 10 minutes with mod I during functional activity in order to perform safe standing ADLs/iADLs                  Plan:  Patient to be seen  (5-6x/week) to address the above listed problems via self-care/home management, therapeutic activities, therapeutic exercises  Plan of Care expires: 05/14/18  Plan of Care reviewed with: patient  A client  care conference was performed between the LOTR and [facility]MAN, prior to treatment by TRENT, to discuss the patient's status, treatment plan and established goals.     TRENT Reid/LINDSEY 4/17/2018

## 2018-04-17 NOTE — PLAN OF CARE
Problem: Fall Risk (Adult)  Goal: Absence of Falls  Patient will demonstrate the desired outcomes by discharge/transition of care.   Outcome: Ongoing (interventions implemented as appropriate)  Pt. Had no falls at this time.will continue to monitor.

## 2018-04-17 NOTE — PLAN OF CARE
Problem: Occupational Therapy Goal  Goal: Occupational Therapy Goal  Goals to be met by: 14 days     Patient will increase functional independence with ADLs by performing:    UE Dressing with Modified [place].  LE Dressing with Modified [place] using AD as needed.  Grooming while standing at sink with Modified [place].  Toileting from toilet with Modified [place] for hygiene and clothing management.   Bathing from  edge of bed with Modified [place].  Toilet transfer to toilet with Modified [place].  Upper extremity exercise program x15 reps per handout, with supervision to improve UE strength/endurance to facilitate func mobility  Stand for 10 minutes with mod I during functional activity in order to perform safe standing ADLs/iADLs     Outcome: Ongoing (interventions implemented as appropriate)  .

## 2018-04-17 NOTE — PROGRESS NOTES
04/17/2018  10:08 AM    Discharge Planning- Message left with 's nurse ([id number])  for a follow up appt for patient.  Edda Thomas RN, [facility]  [id number]    04/17/2018  2:26 PM  Received a call from Vicki with Dr Mahmood's office, patient scheduled for follow up 5/1/2018 at 1pm.  Edda Thomas RN, [facility]  [phone]

## 2018-04-17 NOTE — PT/OT/SLP PROGRESS
Physical Therapy      Patient Name:  Ayaan Ricks   MRN:  [id number]    Patient not seen today secondary to adamant refusal x 2 attempts d/t pain in pts penis associated with his catheter. NSG is aware, has spoken with pt and adjusted catheter anchor earlier in morning  . Will follow-up at next scheduled visit.    Pa Sorenson, PTA

## 2018-04-17 NOTE — PROGRESS NOTES
04/17/2018  11:27 AM  Discharge Planning Assessment     Payor: [employer] MEDICARE / Plan: [employer] MEDICARE HMO / Product Type: Capitation /     Expected length of stay:  [] 7 days   [] 10 days  [x] 14 days   [] 21 days   [] > 30 days    Communicated expected length of stay with patient/caregiver:  [x] Yes   [] No    Anticipated discharge date:  4/26/2018    Assessment information obtained from:  [x] Patient   [] Caregiver     Arrived from:   [x] Home   [] Assisted Living    [] Nursing Home   [] SNF   [] Rehab  [] LTACH   [] Group Home   [] Foster Care   [] Psych   [] Shelter   [] Homeless   [] Transfer  [] Correctional Facility  [] Name of Facility:      Patient currently lives with:    [x] Alone   [] Spouse   [] Daughter   [] Son   [] Grandparents   []  Parents   [] Siblings   [] Friends   [] Domestic Partner   [] Facility Resident      [] Foster Home    [] Other:       Extended Emergency Contact Information  Primary Emergency Contact: Swati Andujar   [facility]  Home Phone: [phone]  Relation: Sister     Prior to hospitalization cognitive status:   [x] Alert/Oriented  [] No Deficits [] Risk of Harm to Self/Others   [] Not Oriented to Person   [] Not Oriented to Place   [] Not Oriented to Time   [] Coma/Sedated/Intubated  [] Judgement Impaired    []  Unable to Assess   [] Inappropriate Behavior  [] Infant/Toddler    Prior to hospitalization functional status:   [x] Independent   [] Assistive Equipment   [] Assistive Person    [] Completely Dependent  [] Infant/Toddler/Child Appropriate   [] Infant/Toddler/Child Delayed    []  Adolescent     Current cognitive status:   [x] Alert/Oriented  [] No Deficits [] Risk of Harm to Self/Others   [] Not Oriented to Person   [] Not Oriented to Place   [] Not Oriented to Time   [] Coma/Sedated/Intubated  [] Judgement Impaired    []  Unable to Assess   [] Inappropriate Behavior  [] Infant/Toddler    Current functional status:     [] Independent   [x]  Assistive Equipment   [x] Assistive Person     [] Completely Dependent   [] Infant/Toddler/Child Appropriate   [] Infant/Toddler/Child Delayed     [] Adolescent     Capacity to Care for Self:   Is patient able to return to prior living arrangements after discharge: [x] Yes  [] No     Is patient able to care for self after discharge?   [] Yes   [x] No     [] Pediatric     Does the patient have family/friends to assist after discharge?:  [x] Yes   [] No    [] N/A   Comments:  Niece      Patient/caregiver perception of discharge disposition:   [x] Home   [] Home with Family  [x] Home Health   [] SNF   [] Rehab   [] LTAC    []  New Nursing Home Placement  [] Return to Nursing Home    [] Shelter     [] Assisted Living  [] Foster Home   [] Other:      Readmit:   Has patient billy in the hospital in the last 30 days? [] Yes   [x] No     If YES, was patient admitted for the same reason?  [] Yes   [] No       Home Health:   Patient currently receives home health services?:   [] Yes   [x] No     Patient previously received home health services and would like to resume services if necessary   [] Yes   [x] No   DME:   Patient currently uses DME:   [] Yes   [x] No        List of equipment currently used:     [] Wheelchair   [] Standard Walker  [] Rolling Walker  []  Rollator    [] Oxygen    [] Portable oxygen   [] Nebulizer    [] Apnea Monitor    [] Crutches  [] Hospital Bed   []  Lift Device   [] Scooter [] Cane     [] Prosthesis   []  BSC   [] Tub Bench   [] Catheter Supplies    [] Ostomy Supplies   [] Trach Supplies     [] Suction Machine        [] Home Vent    [] Bipap   [] Other:         Medications:    Can the patient afford all prescribed medications?  [x] Yes   [] No     If NO, what medication:       Is the patient taking medications as prescribed?    [x] Yes   [] No    Financial Concerns:   Does the patient have any financial concerns?   [] Yes   [x] No      If YES, what are the concerns:      Transportation:   Does the  patient have transportation to healthcare appointments?   [x] Yes   [] No     If YES, what means of transportation does the patient have?   [x] Car   [x] Family/Friend  [] Bicycle   [] Motorcycle   [] Public Transportation [] Ambulance[] Wheelchair van   [] Name of Provider    Dialysis:   Does the patient currently receive dialysis?   [] Yes   [x] No     Primary MD: Alma Oleary  [address]  [place] [address]  Phone [phone]    APS/CPS involved in the case:  [] Yes   [x] No   If YES, name of :     If YES, phone number of :        Discharge Plan A:  [x] Home   [] Home with Family  [x] Home Health   [] SNF   [] Rehab   [] LTAC   []  New Nursing Home Placement  [] Return to Nursing Home    [] Assisted Living    [] Shelter     [] Private Duty Nursing   [] Foster Home    [] Psych    [] Early Steps  [] WIC       [] Home Hospice   [] Inpatient Hospice   [] Other    Discharge Plan B:  [] Home   [] Home with Family  [] Home Health   [] SNF   [] Rehab   [] LTAC  []  New Nursing Home Placement   [] Return to  Nursing Home    [] Assisted Living   [] Shelter  [] Private Duty Nursing   [] Foster Home     [] Psych     [] Early Steps  [] WIC    [] Home Hospice     [] Inpatient Hospice   [] Other     [x] Patient and family in agreement with discharge plan.    Met with patient in room to discuss discharge plan and date. Patient AAO. Discharge plan is to return home alone. Patient stated assist of niece and friends if needed. Informed patient therapy recommends a 2 week SNF stay and that discharge would be 4/26/2018. Patient stated understanding to discharge date. Discharge date written on dry erase board in room. Patient stated no home health preference. Informed patient of urology appt tomorrow 4/18/2018 at 8:40am. Patient stated understanding. CM and SW will continue to follow for any additional needs.  Edda Thomas RN, [facility]  [id number]

## 2018-04-17 NOTE — PROGRESS NOTES
Physical Therapy Discharge Summary    Name: Ayaan Ricks  MRN: [id number]   Principal Problem: Atherosclerotic peripheral vascular disease with rest pain     Patient Discharged from acute Physical Therapy on 18.  Please refer to prior PT noted date on 18 for functional status.     Assessment:     Patient appropriate for care in another setting.    Objective:     GOALS:    Physical Therapy Goals     Not on file          Multidisciplinary Problems (Resolved)        Problem: Physical Therapy Goal    Goal Priority Disciplines Outcome Goal Variances Interventions   Physical Therapy Goal   (Resolved)     PT/OT, PT Outcome(s) achieved     Description:  Goals to be met by: 18     Patient will increase functional independence with mobility by performin. Supine to sit with Modified [place]-not met  2. Sit to supine with Modified [place]-not met  3. Rolling to Right with Modified [place].-not met  4. Sit to stand transfer with Modified [place]-not met  5. Gait  x 100 feet with Stand-by Assistance using Rolling Walker. -not met  6. Ascend/descend 4 stair with right Handrails Stand-by Assistance using least restrictive assistive device-not met                       Reasons for Discontinuation of Therapy Services  Transfer to alternate level of care.      Plan:     Patient Discharged to: Skilled Nursing Facility.    Elaina Lind, PT  2018

## 2018-04-17 NOTE — PLAN OF CARE
Problem: Occupational Therapy Goal  Goal: Occupational Therapy Goal  Goals to be met by: 14 days     Patient will increase functional independence with ADLs by performing:    UE Dressing with Modified [place].  LE Dressing with Modified [place] using AD as needed.  Grooming while standing at sink with Modified [place].  Toileting from toilet with Modified [place] for hygiene and clothing management.   Bathing from  edge of bed with Modified [place].  Toilet transfer to toilet with Modified [place].  Upper extremity exercise program x15 reps per handout, with supervision to improve UE strength/endurance to facilitate func mobility  Stand for 10 minutes with mod I during functional activity in order to perform safe standing ADLs/iADLs     Outcome: Ongoing (interventions implemented as appropriate)  .

## 2018-04-18 ENCOUNTER — OFFICE VISIT (OUTPATIENT)
Dept: UROLOGY | Facility: CLINIC | Age: 73
End: 2018-04-18
Payer: MEDICARE

## 2018-04-18 VITALS — HEIGHT: 70 IN | DIASTOLIC BLOOD PRESSURE: 67 MMHG | SYSTOLIC BLOOD PRESSURE: 113 MMHG | HEART RATE: 97 BPM

## 2018-04-18 DIAGNOSIS — N13.8 BPH WITH OBSTRUCTION/LOWER URINARY TRACT SYMPTOMS: ICD-10-CM

## 2018-04-18 DIAGNOSIS — R33.9 URINARY RETENTION: Primary | ICD-10-CM

## 2018-04-18 DIAGNOSIS — N40.1 BPH WITH OBSTRUCTION/LOWER URINARY TRACT SYMPTOMS: ICD-10-CM

## 2018-04-18 DIAGNOSIS — Z46.6 ENCOUNTER FOR FOLEY CATHETER REMOVAL: ICD-10-CM

## 2018-04-18 DIAGNOSIS — C61 PROSTATE CANCER: ICD-10-CM

## 2018-04-18 PROCEDURE — 63600175 PHARM REV CODE 636 W HCPCS: Performed by: INTERNAL MEDICINE

## 2018-04-18 PROCEDURE — 97530 THERAPEUTIC ACTIVITIES: CPT

## 2018-04-18 PROCEDURE — 97110 THERAPEUTIC EXERCISES: CPT

## 2018-04-18 PROCEDURE — 3074F SYST BP LT 130 MM HG: CPT | Mod: CPTII,S$GLB,, | Performed by: PHYSICIAN ASSISTANT

## 2018-04-18 PROCEDURE — 3078F DIAST BP <80 MM HG: CPT | Mod: CPTII,S$GLB,, | Performed by: PHYSICIAN ASSISTANT

## 2018-04-18 PROCEDURE — 25000242 PHARM REV CODE 250 ALT 637 W/ HCPCS: Performed by: STUDENT IN AN ORGANIZED HEALTH CARE EDUCATION/TRAINING PROGRAM

## 2018-04-18 PROCEDURE — 25000003 PHARM REV CODE 250: Performed by: STUDENT IN AN ORGANIZED HEALTH CARE EDUCATION/TRAINING PROGRAM

## 2018-04-18 PROCEDURE — 25000003 PHARM REV CODE 250: Performed by: INTERNAL MEDICINE

## 2018-04-18 PROCEDURE — 12000000 HC SNF SEMI-PRIVATE ROOM

## 2018-04-18 PROCEDURE — 97116 GAIT TRAINING THERAPY: CPT

## 2018-04-18 PROCEDURE — 99214 OFFICE O/P EST MOD 30 MIN: CPT | Mod: 25,S$GLB,, | Performed by: PHYSICIAN ASSISTANT

## 2018-04-18 PROCEDURE — 51700 IRRIGATION OF BLADDER: CPT | Mod: S$GLB,,, | Performed by: PHYSICIAN ASSISTANT

## 2018-04-18 PROCEDURE — 99999 PR PBB SHADOW E&M-EST. PATIENT-LVL IV: CPT | Mod: PBBFAC,,, | Performed by: PHYSICIAN ASSISTANT

## 2018-04-18 RX ADMIN — OXYCODONE HYDROCHLORIDE 5 MG: 5 TABLET ORAL at 09:04

## 2018-04-18 RX ADMIN — TAMSULOSIN HYDROCHLORIDE 0.8 MG: 0.4 CAPSULE ORAL at 09:04

## 2018-04-18 RX ADMIN — VALSARTAN 160 MG: 80 TABLET ORAL at 09:04

## 2018-04-18 RX ADMIN — TIOTROPIUM BROMIDE 18 MCG: 18 CAPSULE ORAL; RESPIRATORY (INHALATION) at 09:04

## 2018-04-18 RX ADMIN — RAMELTEON 8 MG: 8 TABLET, FILM COATED ORAL at 09:04

## 2018-04-18 RX ADMIN — CLOPIDOGREL BISULFATE 75 MG: 75 TABLET ORAL at 09:04

## 2018-04-18 RX ADMIN — STANDARDIZED SENNA CONCENTRATE AND DOCUSATE SODIUM 1 TABLET: 8.6; 5 TABLET, FILM COATED ORAL at 09:04

## 2018-04-18 RX ADMIN — AMLODIPINE BESYLATE 5 MG: 5 TABLET ORAL at 09:04

## 2018-04-18 RX ADMIN — OXYCODONE HYDROCHLORIDE 5 MG: 5 TABLET ORAL at 02:04

## 2018-04-18 RX ADMIN — HEPARIN SODIUM 5000 UNITS: 5000 INJECTION, SOLUTION INTRAVENOUS; SUBCUTANEOUS at 05:04

## 2018-04-18 RX ADMIN — OXYCODONE HYDROCHLORIDE 5 MG: 5 TABLET ORAL at 05:04

## 2018-04-18 RX ADMIN — FLUCONAZOLE 200 MG: 200 TABLET ORAL at 09:04

## 2018-04-18 RX ADMIN — FERROUS SULFATE TAB EC 325 MG (65 MG FE EQUIVALENT) 325 MG: 325 (65 FE) TABLET DELAYED RESPONSE at 09:04

## 2018-04-18 RX ADMIN — HEPARIN SODIUM 5000 UNITS: 5000 INJECTION, SOLUTION INTRAVENOUS; SUBCUTANEOUS at 02:04

## 2018-04-18 RX ADMIN — ROSUVASTATIN CALCIUM 20 MG: 10 TABLET, FILM COATED ORAL at 09:04

## 2018-04-18 RX ADMIN — HEPARIN SODIUM 5000 UNITS: 5000 INJECTION, SOLUTION INTRAVENOUS; SUBCUTANEOUS at 09:04

## 2018-04-18 NOTE — LETTER
April 18, 2018      BARTOLOME Mahmood III, MD  [address] [facility] [address]           [facility] - Urology 4th Floor  [address]  [facility] [address]  Phone: [phone]          Patient: Ayaan Ricks   MR Number: [id number]   YOB: 1944   Date of Visit: 4/18/2018       Dear Dr. BARTOLOME Mahmood III:    Thank you for referring Ayaan Ricks to me for evaluation. Attached you will find relevant portions of my assessment and plan of care.    If you have questions, please do not hesitate to call me. I look forward to following Ayaan Ricks along with you.    Sincerely,    Jessica Claire PA-C    Enclosure  CC:  No Recipients    If you would like to receive this communication electronically, please contact [email or web address] or [phone] to request more information on [place] [employer] access.    For providers and/or their staff who would like to refer a patient to [facility], please contact us through our one-stop-shop provider referral line, [facility], at [phone].    If you feel you have received this communication in error or would no longer like to receive these types of communications, please e-mail [email or web address]

## 2018-04-18 NOTE — PROGRESS NOTES
04/18/2018  8:16 AM    Cm received a call from Alma Perkins CM ([phone]) stating they use [facility] or [facility] if patient does not have a home health preference. Will inform TATUM Medina.  Edda Thomas RN, CM Skilled  [id number]

## 2018-04-18 NOTE — PT/OT/SLP PROGRESS
Occupational Therapy  Treatment    Ayaan Ricks   MRN: [id number]   Admitting Diagnosis: PVD (peripheral vascular disease) with claudication    OT Date of Treatment: 04/18/18  Total Time (min): 45 min    Billable Minutes:  Therapeutic Activity 25 and Therapeutic Exercise 20    General Precautions: Standard, fall  Orthopedic Precautions: N/A  Braces: N/A         Subjective:  Communicated with nsg prior to session.  They took this catheter out today    Pain/Comfort  Pain Rating 1: 7/10  Location - Side 1: Left  Location - Orientation 1: upper  Location 1: thigh  Pain Addressed 1: Reposition, Distraction, Pre-medicate for activity    Objective:   Pt. Supine on arrival      Functional Status:  MDS G  Bed Mobility Functional Status: mod(I) - (I)  Transfer Functional Status: CGA-Min (A)-EOB <> w/c and from w/c to 3n1 with cues for safety  Dressing Functional Status: 2:CGA-Min (A)LB dressing to doff/srini pants and socks   Toilet Use Functional Status: CGA-Min (A)-standing bal and steadying (A). Pt with frequent and urgency   Personal Hygiene Functional Status: S-SBA  Moving from seated to standing position: Not steady, but able to stabilize without staff assistance  Walking (with assistive device if used): Not steady, only able to stabilize with staff assistance  Moving on and off the toilet: Not steady, only able to stabilize with staff assistance  Surface-to-surface transfer (transfer between bed and chair or wheelchair): Not steady, only able to stabilize with staff assistance           [facility] 6 Click:  [facility] Total Score: 18    OT Exercises: UE Ergometer 10 min  [place] 15# 4 x 25 reps    Additional Treatment:  Pt. With standing activity on this day Pt. With limited standing on this day apporx 8 min on this day due to urgency to urinate on this day. Pt. With mild incot due to urgency     Patient left seated EOB with all lines intact and call button in reach    ASSESSMENT:  Ayaan Ricks is a 73 y.o. male with a  medical diagnosis of PVD (peripheral vascular disease) with claudication Pt. participated well with session on this day.Pt demos physical deficits with balance  functional mobility, UB strength, endurance  level of functional indep with daily tasks and activities and selfcare skills .Pt. Will continue to benefit from continued OT to progress towards goals  .    Rehab identified problem list/impairments: weakness, impaired endurance, impaired self care skills, impaired functional mobilty, gait instability, impaired balance, visual deficits, decreased lower extremity function, decreased safety awareness, pain, impaired coordination, impaired skin, edema    Rehab potential is fair    Activity tolerance: Fair    Discharge recommendations: home with home health     Barriers to discharge: None     Equipment recommendations: bedside commode, bath bench, walker, rolling     GOALS:    Occupational Therapy Goals        Problem: Occupational Therapy Goal    Goal Priority Disciplines Outcome Interventions   Occupational Therapy Goal     OT, PT/OT Ongoing (interventions implemented as appropriate)    Description:  Goals to be met by: 14 days     Patient will increase functional independence with ADLs by performing:    UE Dressing with Modified [place].  LE Dressing with Modified [place] using AD as needed.  Grooming while standing at sink with Modified [place].  Toileting from toilet with Modified [place] for hygiene and clothing management.   Bathing from  edge of bed with Modified [place].  Toilet transfer to toilet with Modified [place].  Upper extremity exercise program x15 reps per handout, with supervision to improve UE strength/endurance to facilitate func mobility  Stand for 10 minutes with mod I during functional activity in order to perform safe standing ADLs/iADLs                  Plan:  Patient to be seen  (5-6x/week) to address the above listed problems via self-care/home management,  therapeutic activities, therapeutic exercises  Plan of Care expires: 05/14/18  Plan of Care reviewed with: patient    TRENT Reid/LINDSEY  04/18/2018
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
## 2018-04-18 NOTE — PT/OT/SLP PROGRESS
"Physical Therapy  Treatment    Ayaan Ricks   MRN: [id number]   Admitting Diagnosis: PVD (peripheral vascular disease) with claudication    PT Received On: 04/18/18  Total Time (min): 45       Billable Minutes:45    Gait Training 15, Therapeutic Activity 15 and Therapeutic Exercise 15    Treatment Type: Treatment  PT/PTA: PTA     PTA Visit Number: 2       General Precautions: Standard,    Orthopedic Precautions:     Braces:      Do you have any cultural, spiritual, Confucianist conflicts, given your current situation?: none    Subjective:  "thought I was finished" repts of fatigue, MD appt in am, agreeable with encouragement and to cut back a little bit today      Pain/Comfort  Pain Rating 1: 6/10 (0 at rest)  Location - Side 1: Left  Location - Orientation 1: generalized  Location 1: leg  Pain Addressed 1: Pre-medicate for activity, Reposition, Distraction, Cessation of Activity, Nurse notified  Pain Rating Post-Intervention 1: 6/10 (inc with mobility)    Objective:   Patient found with:  (in bed)       Functional Status:  MDS G  Bed Mobility Functional Status: mod(I) - (I)  Transfer Functional Status: CGA-Min (A)  Walk in Corridor Functional Status: CGA-Min (A)  Locomotion on Unit Functional Status: S-SBA          AM-PAC 6 CLICK MOBILITY  Total Score:20    Bed Mobility:  Sit>Supine:mod I  Supine>Sit: mod I    Transfers:  Sit<>Stand: with RW CG/SBA  Stand Pivot Transfer: CG/SBA no AD EOB<>WC      Gait:  Amb with RW CG/SBA ~ 140 ft x 2 trials, mild antalgic gait noted    Advanced Gait:  Stairs: asc/chencho 4 steps with BHR CG/SBA vcs for safety/tech      Wheelchair Mobility:  Patient propels w/c ~125 ft from gym back to room S BUE    Therex:  2x15 reps AP,GS,LAQ,hip flex,abd/add    Patient left supine with call button in reach and belongings in reach.    Assessment:  Ayaan Ricks is a 73 y.o. male with a medical diagnosis of PVD (peripheral vascular disease) with claudication.  Pt tolerated well, demo good effort " despite subj c/o fatigue, doing a few extra reps with therex, appears motivated to return to PLOF, pt would continue to benefit from skilled PT services to improve overall functional mobility, strength and endurance.  .    Rehab identified problem list/impairments: weakness, impaired endurance, gait instability, impaired balance, pain, edema    Rehab potential is good.    Activity tolerance: Fair    Discharge recommendations: home health PT     Barriers to discharge: None    Equipment recommendations: bath bench, walker, rolling     GOALS:    Physical Therapy Goals        Problem: Physical Therapy Goal    Goal Priority Disciplines Outcome Goal Variances Interventions   Physical Therapy Goal     PT/OT, PT Ongoing (interventions implemented as appropriate)     Description:  Goals to be met by: 14 days     Patient will increase functional independence with mobility by performin. Sit to stand transfer with Modified [place]  2. Bed to chair transfer with Modified [place] using Rolling Walker  3. Gait  x 150 feet with Modified [place] using Rolling Walker.   4. Wheelchair propulsion x300 feet with Modified [place] using bilateral uppper extremities  5. Ascend/descend 4 stair with right Handrails Supervision   6. Ascend/Descend 4 inch curb step with Supervision using Rolling Walker.  7. Lower extremity exercise program x20 reps per handout, with independence                      PLAN:    Patient to be seen  (5-6x/week)  to address the above listed problems via gait training, therapeutic activities, therapeutic exercises, neuromuscular re-education, wheelchair management/training  Plan of Care expires: 18  Plan of Care reviewed with: patient    Eulalia Dumont, PTA  2018

## 2018-04-18 NOTE — PLAN OF CARE
Problem: Physical Therapy Goal  Goal: Physical Therapy Goal  Goals to be met by: 14 days     Patient will increase functional independence with mobility by performin. Sit to stand transfer with Modified [place]  2. Bed to chair transfer with Modified [place] using Rolling Walker  3. Gait  x 150 feet with Modified [place] using Rolling Walker.   4. Wheelchair propulsion x300 feet with Modified [place] using bilateral uppper extremities  5. Ascend/descend 4 stair with right Handrails Supervision   6. Ascend/Descend 4 inch curb step with Supervision using Rolling Walker.  7. Lower extremity exercise program x20 reps per handout, with independence     Outcome: Ongoing (interventions implemented as appropriate)  Goals remain appropriate

## 2018-04-18 NOTE — PATIENT INSTRUCTIONS
Patient had a voiding trial in office today.  He was able to void without difficulty.  Please monitor patient to ensure he is still able to void.  If patient exhibits signs of retention please place an 18fr khan catheter with 10cc of sterile water in balloon.  If catheter has to be placed, please contact the office at [phone] so we can make a follow up appointment for another voiding trial.  If patient continues to be able to void without difficulty he is to follow up in 6 weeks.    Continue flomax  Continue [facility] ups at [facility].

## 2018-04-18 NOTE — PLAN OF CARE
Problem: Fall Risk (Adult)  Goal: Identify Related Risk Factors and Signs and Symptoms  Related risk factors and signs and symptoms are identified upon initiation of Human [employer] Clinical Practice Guideline (CPG)    04/18/18 0158   Fall Risk   Related Risk Factors (Fall Risk) gait/mobility problems;impaired vision;polypharmacy   Signs and Symptoms (Fall Risk) presence of risk factors

## 2018-04-18 NOTE — PLAN OF CARE
Problem: Occupational Therapy Goal  Goal: Occupational Therapy Goal  Goals to be met by: 14 days     Patient will increase functional independence with ADLs by performing:    UE Dressing with Modified [place].  LE Dressing with Modified [place] using AD as needed.  Grooming while standing at sink with Modified [place].  Toileting from toilet with Modified [place] for hygiene and clothing management.   Bathing from  edge of bed with Modified [place].  Toilet transfer to toilet with Modified [place].  Upper extremity exercise program x15 reps per handout, with supervision to improve UE strength/endurance to facilitate func mobility  Stand for 10 minutes with mod I during functional activity in order to perform safe standing ADLs/iADLs     Outcome: Ongoing (interventions implemented as appropriate)  .

## 2018-04-18 NOTE — NURSING
"Pt refusing all meds...states we should know when his pain meds are due and just bring them..i told him he needed to ask for the pain meds and he could have them every 4 hours.i  asked if he wanted the pain meds now and he said no I will call when I need them.." I am not taking no damn pills or shots"  "

## 2018-04-19 LAB
ANION GAP SERPL CALC-SCNC: 11 MMOL/L
BASOPHILS # BLD AUTO: 0.01 K/UL
BASOPHILS NFR BLD: 0.2 %
BUN SERPL-MCNC: 23 MG/DL
CALCIUM SERPL-MCNC: 9 MG/DL
CHLORIDE SERPL-SCNC: 101 MMOL/L
CO2 SERPL-SCNC: 20 MMOL/L
CREAT SERPL-MCNC: 1.3 MG/DL
DIFFERENTIAL METHOD: ABNORMAL
EOSINOPHIL # BLD AUTO: 0.1 K/UL
EOSINOPHIL NFR BLD: 1.9 %
ERYTHROCYTE [DISTWIDTH] IN BLOOD BY AUTOMATED COUNT: 14.6 %
EST. GFR  (AFRICAN AMERICAN): >60 ML/MIN/1.73 M^2
EST. GFR  (NON AFRICAN AMERICAN): 54.1 ML/MIN/1.73 M^2
GLUCOSE SERPL-MCNC: 77 MG/DL
HCT VFR BLD AUTO: 25.2 %
HGB BLD-MCNC: 8.1 G/DL
IMM GRANULOCYTES # BLD AUTO: 0.02 K/UL
IMM GRANULOCYTES NFR BLD AUTO: 0.4 %
LYMPHOCYTES # BLD AUTO: 2 K/UL
LYMPHOCYTES NFR BLD: 36.5 %
MAGNESIUM SERPL-MCNC: 1.6 MG/DL
MCH RBC QN AUTO: 29.9 PG
MCHC RBC AUTO-ENTMCNC: 32.1 G/DL
MCV RBC AUTO: 93 FL
MONOCYTES # BLD AUTO: 0.8 K/UL
MONOCYTES NFR BLD: 15.1 %
NEUTROPHILS # BLD AUTO: 2.5 K/UL
NEUTROPHILS NFR BLD: 45.9 %
NRBC BLD-RTO: 0 /100 WBC
PHOSPHATE SERPL-MCNC: 4.4 MG/DL
PLATELET # BLD AUTO: 333 K/UL
PMV BLD AUTO: 8.9 FL
POTASSIUM SERPL-SCNC: 4.8 MMOL/L
RBC # BLD AUTO: 2.71 M/UL
SODIUM SERPL-SCNC: 132 MMOL/L
WBC # BLD AUTO: 5.37 K/UL

## 2018-04-19 PROCEDURE — 83735 ASSAY OF MAGNESIUM: CPT

## 2018-04-19 PROCEDURE — 25000003 PHARM REV CODE 250: Performed by: INTERNAL MEDICINE

## 2018-04-19 PROCEDURE — 97116 GAIT TRAINING THERAPY: CPT

## 2018-04-19 PROCEDURE — 63600175 PHARM REV CODE 636 W HCPCS: Performed by: INTERNAL MEDICINE

## 2018-04-19 PROCEDURE — 97110 THERAPEUTIC EXERCISES: CPT

## 2018-04-19 PROCEDURE — 80048 BASIC METABOLIC PNL TOTAL CA: CPT

## 2018-04-19 PROCEDURE — 97530 THERAPEUTIC ACTIVITIES: CPT

## 2018-04-19 PROCEDURE — 25000242 PHARM REV CODE 250 ALT 637 W/ HCPCS: Performed by: STUDENT IN AN ORGANIZED HEALTH CARE EDUCATION/TRAINING PROGRAM

## 2018-04-19 PROCEDURE — 25000003 PHARM REV CODE 250: Performed by: STUDENT IN AN ORGANIZED HEALTH CARE EDUCATION/TRAINING PROGRAM

## 2018-04-19 PROCEDURE — 36415 COLL VENOUS BLD VENIPUNCTURE: CPT

## 2018-04-19 PROCEDURE — 84100 ASSAY OF PHOSPHORUS: CPT

## 2018-04-19 PROCEDURE — 12000000 HC SNF SEMI-PRIVATE ROOM

## 2018-04-19 PROCEDURE — 85025 COMPLETE CBC W/AUTO DIFF WBC: CPT

## 2018-04-19 RX ORDER — VALSARTAN 80 MG/1
80 TABLET ORAL DAILY
Status: DISCONTINUED | OUTPATIENT
Start: 2018-04-20 | End: 2018-04-23 | Stop reason: HOSPADM

## 2018-04-19 RX ORDER — TAMSULOSIN HYDROCHLORIDE 0.4 MG/1
0.8 CAPSULE ORAL NIGHTLY
Status: DISCONTINUED | OUTPATIENT
Start: 2018-04-20 | End: 2018-04-23 | Stop reason: HOSPADM

## 2018-04-19 RX ORDER — AMLODIPINE BESYLATE 2.5 MG/1
2.5 TABLET ORAL DAILY
Status: DISCONTINUED | OUTPATIENT
Start: 2018-04-20 | End: 2018-04-20

## 2018-04-19 RX ADMIN — TIOTROPIUM BROMIDE 18 MCG: 18 CAPSULE ORAL; RESPIRATORY (INHALATION) at 09:04

## 2018-04-19 RX ADMIN — OXYCODONE HYDROCHLORIDE 5 MG: 5 TABLET ORAL at 01:04

## 2018-04-19 RX ADMIN — STANDARDIZED SENNA CONCENTRATE AND DOCUSATE SODIUM 1 TABLET: 8.6; 5 TABLET, FILM COATED ORAL at 10:04

## 2018-04-19 RX ADMIN — FLUCONAZOLE 200 MG: 200 TABLET ORAL at 09:04

## 2018-04-19 RX ADMIN — FERROUS SULFATE TAB EC 325 MG (65 MG FE EQUIVALENT) 325 MG: 325 (65 FE) TABLET DELAYED RESPONSE at 09:04

## 2018-04-19 RX ADMIN — CLOPIDOGREL BISULFATE 75 MG: 75 TABLET ORAL at 09:04

## 2018-04-19 RX ADMIN — HEPARIN SODIUM 5000 UNITS: 5000 INJECTION, SOLUTION INTRAVENOUS; SUBCUTANEOUS at 10:04

## 2018-04-19 RX ADMIN — OXYCODONE HYDROCHLORIDE 5 MG: 5 TABLET ORAL at 10:04

## 2018-04-19 RX ADMIN — OXYCODONE HYDROCHLORIDE 5 MG: 5 TABLET ORAL at 05:04

## 2018-04-19 RX ADMIN — STANDARDIZED SENNA CONCENTRATE AND DOCUSATE SODIUM 1 TABLET: 8.6; 5 TABLET, FILM COATED ORAL at 09:04

## 2018-04-19 RX ADMIN — HEPARIN SODIUM 5000 UNITS: 5000 INJECTION, SOLUTION INTRAVENOUS; SUBCUTANEOUS at 05:04

## 2018-04-19 RX ADMIN — ACETAMINOPHEN 650 MG: 325 TABLET ORAL at 10:04

## 2018-04-19 RX ADMIN — TAMSULOSIN HYDROCHLORIDE 0.8 MG: 0.4 CAPSULE ORAL at 09:04

## 2018-04-19 RX ADMIN — ROSUVASTATIN CALCIUM 20 MG: 10 TABLET, FILM COATED ORAL at 09:04

## 2018-04-19 RX ADMIN — OXYCODONE HYDROCHLORIDE 5 MG: 5 TABLET ORAL at 06:04

## 2018-04-19 RX ADMIN — RAMELTEON 8 MG: 8 TABLET, FILM COATED ORAL at 10:04

## 2018-04-19 RX ADMIN — HEPARIN SODIUM 5000 UNITS: 5000 INJECTION, SOLUTION INTRAVENOUS; SUBCUTANEOUS at 02:04

## 2018-04-19 NOTE — PLAN OF CARE
Problem: Occupational Therapy Goal  Goal: Occupational Therapy Goal  Goals to be met by: 14 days     Patient will increase functional independence with ADLs by performing:    UE Dressing with Modified [place].  LE Dressing with Modified [place] using AD as needed.  Grooming while standing at sink with Modified [place].  Toileting from toilet with Modified [place] for hygiene and clothing management.   Bathing from  edge of bed with Modified [place].  Toilet transfer to toilet with Modified [place].  Upper extremity exercise program x15 reps per handout, with supervision to improve UE strength/endurance to facilitate func mobility  Stand for 10 minutes with mod I during functional activity in order to perform safe standing ADLs/iADLs     Outcome: Ongoing (interventions implemented as appropriate)  .

## 2018-04-19 NOTE — PLAN OF CARE
Problem: Physical Therapy Goal  Goal: Physical Therapy Goal  Goals to be met by: 14 days     Patient will increase functional independence with mobility by performin. Sit to stand transfer with Modified [place]  2. Bed to chair transfer with Modified [place] using Rolling Walker  3. Gait  x 150 feet with Modified [place] using Rolling Walker.   4. Wheelchair propulsion x300 feet with Modified [place] using bilateral uppper extremities  5. Ascend/descend 4 stair with right Handrails Supervision   6. Ascend/Descend 4 inch curb step with Supervision using Rolling Walker.  7. Lower extremity exercise program x20 reps per handout, with independence     Outcome: Ongoing (interventions implemented as appropriate)  Goals remain appropriate.

## 2018-04-19 NOTE — PT/OT/SLP PROGRESS
Occupational Therapy  Treatment    Ayaan Ricks   MRN: [id number]   Admitting Diagnosis: PVD (peripheral vascular disease) with claudication    OT Date of Treatment: 04/19/18  Total Time (min): 45 min    Billable Minutes:  Therapeutic Activity 20 and Therapeutic Exercise 25    General Precautions: Standard, fall  Orthopedic Precautions: N/A  Braces: N/A         Subjective:  Communicated with NSG prior to session.  I am doing well today    Pain/Comfort  Pain Rating 1: 6/10  Location - Side 1: Left  Location 1: leg  Pain Rating Post-Intervention 1: 6/10    Objective:   Pt. Supine on arrival    Functional Status:  MDS G  Bed Mobility Functional Status: mod(I) - (I) Supine to sit  Transfer Functional Status: CGA-Min (A) from EOB to w/c with SPT and cues for safety and from w/c  Dressing Functional Status: 1: S-SBA for LB dressing to srini pants from EOB level  Personal Hygiene Functional Status: S-SBA   Moving from seated to standing position: Not steady, but able to stabilize without staff assistance  Surface-to-surface transfer (transfer between bed and chair or wheelchair): Not steady, but able to stabilize without staff assistance           [facility] 6 Click:  [facility] Total Score: 19    OT Exercises: UE Ergometer 15 min  Rickshaw 10# 4 x 25 reps    Additional Treatment:  Pt. With standing act on this day. Pt. Able to tolerate x 5 min of standing on this day mild complaints of pain in LLE seated rest break inbetween task. Pt. With standing and therex performed to increase ROM, endurance selfcare task and fxl mobility for independence     Patient left supine with all lines intact and call button in reach    ASSESSMENT:  Ayaan Ricks is a 73 y.o. male with a medical diagnosis of PVD (peripheral vascular disease) with claudication Pt. participated well with session on this day.Pt demos physical deficits with balance  functional mobility, UB strength, endurance  level of functional indep with daily tasks and activities  and selfcare skills .Pt. Will continue to benefit from continued OT to progress towards goals      Rehab identified problem list/impairments: weakness, impaired endurance, impaired self care skills, impaired functional mobilty, gait instability, impaired balance, visual deficits, decreased lower extremity function, decreased safety awareness, pain, impaired coordination, impaired skin, edema    Rehab potential is fair    Activity tolerance: Fair    Discharge recommendations: home with home health     Barriers to discharge: None     Equipment recommendations: bedside commode, bath bench, walker, rolling     GOALS:    Occupational Therapy Goals        Problem: Occupational Therapy Goal    Goal Priority Disciplines Outcome Interventions   Occupational Therapy Goal     OT, PT/OT Ongoing (interventions implemented as appropriate)    Description:  Goals to be met by: 14 days     Patient will increase functional independence with ADLs by performing:    UE Dressing with Modified [place].  LE Dressing with Modified [place] using AD as needed.  Grooming while standing at sink with Modified [place].  Toileting from toilet with Modified [place] for hygiene and clothing management.   Bathing from  edge of bed with Modified [place].  Toilet transfer to toilet with Modified [place].  Upper extremity exercise program x15 reps per handout, with supervision to improve UE strength/endurance to facilitate func mobility  Stand for 10 minutes with mod I during functional activity in order to perform safe standing ADLs/iADLs                  Plan:  Patient to be seen  (5-6x/week) to address the above listed problems via self-care/home management, therapeutic activities, therapeutic exercises  Plan of Care expires: 05/14/18  Plan of Care reviewed with: patient    VENECIA Reid  04/19/2018

## 2018-04-19 NOTE — PT/OT/SLP PROGRESS
Physical Therapy  Treatment    Ayaan Ricks   MRN: [id number]   Admitting Diagnosis: PVD (peripheral vascular disease) with claudication    PT Received On: 04/19/18  Total Time (min): 55       Billable Minutes:  Gait Training 25, Therapeutic Activity 20 and Therapeutic Exercise 10    Treatment Type: Treatment  PT/PTA: PTA     PTA Visit Number: 3       General Precautions: Standard,    Orthopedic Precautions:     Braces:      Do you have any cultural, spiritual, Roman [employer] conflicts, given your current situation?: none    Subjective:  Communicated with NSG prior to session.  Pt agreeable to therapy.     Pain/Comfort  Pain Rating 1: 6/10  Location - Side 1: Left  Location - Orientation 1: generalized  Location 1: leg  Pain Addressed 1: Reposition, Distraction  Pain Rating Post-Intervention 1: 6/10    Objective:  Patient found supine in bed.         Functional Status:  MDS G  Bed Mobility Functional Status: mod(I) - (I)  Transfer Functional Status: CGA-Min (A)  Walk in Room Functional Status: CGA-Min (A)  Walk in Corridor Functional Status: CGA-Min (A)  Locomotion on Unit Functional Status: S-SBA  Moving from seated to standing position: Not steady, but able to stabilize without staff assistance  Walking (with assistive device if used): Not steady, but able to stabilize without staff assistance  Turning around and facing the opposite direction while walking: Not steady, but able to stabilize without staff assistance  Moving on and off the toilet: Not steady, but able to stabilize without staff assistance  Surface-to-surface transfer (transfer between bed and chair or wheelchair): Not steady, but able to stabilize without staff assistance          [facility]-P[facility] 6 CLICK MOBILITY  Total Score:20    Bed Mobility:  Supine>Sit: Mod-I    Transfers:  Sit<>Stand: CGA with no AD, from bed w/c, and commode.   Stand Pivot Transfer: CGA with no AD, bed <> w/c.     Gait:  Amb: Pt ambulated 300 ft with RW and CGA/SBA. Pt with c/o fatigue  but declines sitting rest break. Pt ambulates with slow liliya and decreased step length. Occasional forward flexed posture that improves with cueing.     Advanced Gait:  Stairs: Pt asc/dec 4 steps x 4 trials with BHR support and CGA/SBA. Pt with seated rest between trials 2 and 3. Pt cued for improved posture, foot placement on each step, and safety.     Therex:  Pt pedaled the LBE x 10 minutes with BLE to improve endurance and strength, with pt taking 2 rest breaks as needed.     Balance:  Pt with fair balance, requiring AD for support and stability.     Additional Treatment:  Pt assisted to standing at commode in gym with CGA/SBA and no AD for toileting to urinate. Pt assisted to stand and wash hands at which point pt indicated he needed to sit to commode for a bowel movement. Pt sits to commode with CGA and toilets with set-up assistance.     Patient left sitting EOB  with call button in reach.    Assessment:  Ayaan Ricks is a 73 y.o. male with a medical diagnosis of PVD (peripheral vascular disease) with claudication.  Pt tolerated tx well with focus on gait training, transfers, stair training, and therex. Pt progressing well with better toleration of therapy this day. Pt still requiring some encouragement to participate as pt has c/o not being able to rest during the day. Pt will continue to benefit from skilled therapy to improve impairments listed below.    Rehab identified problem list/impairments: weakness, impaired endurance, gait instability, impaired balance, pain, edema    Rehab potential is good.    Activity tolerance: Fair    Discharge recommendations: home health PT     Barriers to discharge: None    Equipment recommendations: bath bench, walker, rolling     GOALS:    Physical Therapy Goals        Problem: Physical Therapy Goal    Goal Priority Disciplines Outcome Goal Variances Interventions   Physical Therapy Goal     PT/OT, PT Ongoing (interventions implemented as appropriate)      Description:  Goals to be met by: 14 days     Patient will increase functional independence with mobility by performin. Sit to stand transfer with Modified [place]  2. Bed to chair transfer with Modified [place] using Rolling Walker  3. Gait  x 150 feet with Modified [place] using Rolling Walker.   4. Wheelchair propulsion x300 feet with Modified [place] using bilateral uppper extremities  5. Ascend/descend 4 stair with right Handrails Supervision   6. Ascend/Descend 4 inch curb step with Supervision using Rolling Walker.  7. Lower extremity exercise program x20 reps per handout, with independence                      PLAN:    Patient to be seen  (5-6x/week)  to address the above listed problems via gait training, therapeutic activities, therapeutic exercises, neuromuscular re-education, wheelchair management/training  Plan of Care expires: 18  Plan of Care reviewed with: patient    Pa Sorenson, PTA  2018

## 2018-04-20 LAB
ANION GAP SERPL CALC-SCNC: 6 MMOL/L
BACTERIA #/AREA URNS AUTO: ABNORMAL /HPF
BILIRUB UR QL STRIP: NEGATIVE
BUN SERPL-MCNC: 20 MG/DL
CALCIUM SERPL-MCNC: 9.4 MG/DL
CHLORIDE SERPL-SCNC: 98 MMOL/L
CLARITY UR REFRACT.AUTO: CLEAR
CO2 SERPL-SCNC: 27 MMOL/L
COLOR UR AUTO: YELLOW
CREAT SERPL-MCNC: 1.1 MG/DL
EST. GFR  (AFRICAN AMERICAN): >60 ML/MIN/1.73 M^2
EST. GFR  (NON AFRICAN AMERICAN): >60 ML/MIN/1.73 M^2
GLUCOSE SERPL-MCNC: 86 MG/DL
GLUCOSE UR QL STRIP: NEGATIVE
HGB UR QL STRIP: ABNORMAL
KETONES UR QL STRIP: NEGATIVE
LEUKOCYTE ESTERASE UR QL STRIP: ABNORMAL
MICROSCOPIC COMMENT: ABNORMAL
NITRITE UR QL STRIP: NEGATIVE
PH UR STRIP: 5 [PH] (ref 5–8)
POTASSIUM SERPL-SCNC: 4.7 MMOL/L
PROT UR QL STRIP: NEGATIVE
RBC #/AREA URNS AUTO: 9 /HPF (ref 0–4)
SODIUM SERPL-SCNC: 131 MMOL/L
SP GR UR STRIP: 1.01 (ref 1–1.03)
SQUAMOUS #/AREA URNS AUTO: 1 /HPF
URN SPEC COLLECT METH UR: ABNORMAL
UROBILINOGEN UR STRIP-ACNC: NEGATIVE EU/DL
WBC #/AREA URNS AUTO: 6 /HPF (ref 0–5)

## 2018-04-20 PROCEDURE — 25000003 PHARM REV CODE 250: Performed by: NURSE PRACTITIONER

## 2018-04-20 PROCEDURE — 97530 THERAPEUTIC ACTIVITIES: CPT

## 2018-04-20 PROCEDURE — 12000000 HC SNF SEMI-PRIVATE ROOM

## 2018-04-20 PROCEDURE — 63600175 PHARM REV CODE 636 W HCPCS: Performed by: INTERNAL MEDICINE

## 2018-04-20 PROCEDURE — 81001 URINALYSIS AUTO W/SCOPE: CPT

## 2018-04-20 PROCEDURE — 97110 THERAPEUTIC EXERCISES: CPT

## 2018-04-20 PROCEDURE — 87086 URINE CULTURE/COLONY COUNT: CPT

## 2018-04-20 PROCEDURE — 97803 MED NUTRITION INDIV SUBSEQ: CPT

## 2018-04-20 PROCEDURE — 36415 COLL VENOUS BLD VENIPUNCTURE: CPT

## 2018-04-20 PROCEDURE — 25000003 PHARM REV CODE 250: Performed by: STUDENT IN AN ORGANIZED HEALTH CARE EDUCATION/TRAINING PROGRAM

## 2018-04-20 PROCEDURE — 97116 GAIT TRAINING THERAPY: CPT

## 2018-04-20 PROCEDURE — 80048 BASIC METABOLIC PNL TOTAL CA: CPT

## 2018-04-20 PROCEDURE — 25000003 PHARM REV CODE 250: Performed by: INTERNAL MEDICINE

## 2018-04-20 PROCEDURE — 99900058 HC 022 PAID UNDER SNF PPS

## 2018-04-20 RX ORDER — LATANOPROST 50 UG/ML
1 SOLUTION/ DROPS OPHTHALMIC NIGHTLY
Status: DISCONTINUED | OUTPATIENT
Start: 2018-04-20 | End: 2018-04-23 | Stop reason: HOSPADM

## 2018-04-20 RX ORDER — LATANOPROST 50 UG/ML
1 SOLUTION/ DROPS OPHTHALMIC NIGHTLY
COMMUNITY

## 2018-04-20 RX ORDER — DORZOLAMIDE HYDROCHLORIDE AND TIMOLOL MALEATE 20; 5 MG/ML; MG/ML
1 SOLUTION/ DROPS OPHTHALMIC 2 TIMES DAILY
COMMUNITY

## 2018-04-20 RX ORDER — TIMOLOL MALEATE 5 MG/ML
1 SOLUTION/ DROPS OPHTHALMIC 2 TIMES DAILY
Status: DISCONTINUED | OUTPATIENT
Start: 2018-04-20 | End: 2018-04-23 | Stop reason: HOSPADM

## 2018-04-20 RX ORDER — DORZOLAMIDE HCL 20 MG/ML
1 SOLUTION/ DROPS OPHTHALMIC 2 TIMES DAILY
Status: DISCONTINUED | OUTPATIENT
Start: 2018-04-20 | End: 2018-04-23 | Stop reason: HOSPADM

## 2018-04-20 RX ADMIN — RAMELTEON 8 MG: 8 TABLET, FILM COATED ORAL at 08:04

## 2018-04-20 RX ADMIN — OXYCODONE HYDROCHLORIDE 5 MG: 5 TABLET ORAL at 08:04

## 2018-04-20 RX ADMIN — HEPARIN SODIUM 5000 UNITS: 5000 INJECTION, SOLUTION INTRAVENOUS; SUBCUTANEOUS at 06:04

## 2018-04-20 RX ADMIN — ACETAMINOPHEN 650 MG: 325 TABLET ORAL at 08:04

## 2018-04-20 RX ADMIN — TIMOLOL MALEATE 1 DROP: 5 SOLUTION OPHTHALMIC at 08:04

## 2018-04-20 RX ADMIN — OXYCODONE HYDROCHLORIDE 5 MG: 5 TABLET ORAL at 10:04

## 2018-04-20 RX ADMIN — CLOPIDOGREL BISULFATE 75 MG: 75 TABLET ORAL at 09:04

## 2018-04-20 RX ADMIN — TAMSULOSIN HYDROCHLORIDE 0.8 MG: 0.4 CAPSULE ORAL at 08:04

## 2018-04-20 RX ADMIN — LATANOPROST 1 DROP: 50 SOLUTION OPHTHALMIC at 08:04

## 2018-04-20 RX ADMIN — DORZOLAMIDE HYDROCHLORIDE 1 DROP: 20 SOLUTION/ DROPS OPHTHALMIC at 08:04

## 2018-04-20 RX ADMIN — HEPARIN SODIUM 5000 UNITS: 5000 INJECTION, SOLUTION INTRAVENOUS; SUBCUTANEOUS at 02:04

## 2018-04-20 RX ADMIN — FLUCONAZOLE 200 MG: 200 TABLET ORAL at 09:04

## 2018-04-20 RX ADMIN — FERROUS SULFATE TAB EC 325 MG (65 MG FE EQUIVALENT) 325 MG: 325 (65 FE) TABLET DELAYED RESPONSE at 09:04

## 2018-04-20 RX ADMIN — STANDARDIZED SENNA CONCENTRATE AND DOCUSATE SODIUM 1 TABLET: 8.6; 5 TABLET, FILM COATED ORAL at 08:04

## 2018-04-20 RX ADMIN — ROSUVASTATIN CALCIUM 20 MG: 10 TABLET, FILM COATED ORAL at 09:04

## 2018-04-20 RX ADMIN — HEPARIN SODIUM 5000 UNITS: 5000 INJECTION, SOLUTION INTRAVENOUS; SUBCUTANEOUS at 08:04

## 2018-04-20 NOTE — NURSING
Nurse Practioner, Tosin, informed of pt's c/o burning on urination. Discarded 450 cc of clear yellow urine from urinal.

## 2018-04-20 NOTE — PLAN OF CARE
Problem: Patient Care Overview  Goal: Plan of Care Review  Outcome: Ongoing (interventions implemented as appropriate)   04/20/18 0145   Coping/Psychosocial   Plan Of Care Reviewed With patient       Problem: Fall Risk (Adult)  Goal: Identify Related Risk Factors and Signs and Symptoms  Related risk factors and signs and symptoms are identified upon initiation of [employer] Clinical Practice Guideline (CPG)   Outcome: Ongoing (interventions implemented as appropriate)   04/20/18 0145   Fall Risk   Related Risk Factors (Fall Risk) age-related changes;gait/mobility problems;fatigue/slow reaction;homeostatic imbalance

## 2018-04-20 NOTE — PT/OT/SLP PROGRESS
Physical Therapy  Treatment    Ayaan Ricks   MRN: [id number]   Admitting Diagnosis: PVD (peripheral vascular disease) with claudication    PT Received On: 04/20/18  Total Time (min): 45       Billable Minutes:  Gait Training 15, Therapeutic Activity 15 and Therapeutic Exercise 15    Treatment Type: Treatment  PT/PTA: PTA     PTA Visit Number: 4       General Precautions: Standard,    Orthopedic Precautions:     Braces:      Do you have any cultural, spiritual, Temple conflicts, given your current situation?: none    Subjective:  Communicated with NSG prior to session.  Pt agreeable to therapy.     Pain/Comfort  Pain Rating 1: 4/10  Location - Side 1: Left  Location 1: leg  Pain Addressed 1: Reposition, Distraction  Pain Rating Post-Intervention 1: 4/10    Objective:  Patient found supine in bed sleeping. with         Functional Status:  MDS G  Bed Mobility Functional Status: mod(I) - (I)  Transfer Functional Status: S-SBA  Walk in Room Functional Status: S-SBA  Walk in Corridor Functional Status: S-SBA  Locomotion on Unit Functional Status: mod(I) - (I)  Locomotion Off Unit Functional Status: mod(I) - (I)  Moving from seated to standing position: Not steady, but able to stabilize without staff assistance  Walking (with assistive device if used): Not steady, but able to stabilize without staff assistance  Turning around and facing the opposite direction while walking: Not steady, but able to stabilize without staff assistance  Surface-to-surface transfer (transfer between bed and chair or wheelchair): Not steady, but able to stabilize without staff assistance          [facility] 6 CLICK MOBILITY  Total Score:20    Bed Mobility:  Sit>Supine: mod-I  Supine>Sit: mod-I     Transfers:  Sit<>Stand: mod-I with and without RW, from bed and w/c.   Stand Pivot Transfer: mod-I without RW, bed <> w/c.     Gait:  Amb: Pt ambulated ~ 200 ft x 2 trials on outdoor cement surface with RW and Supervision. Pt cued for safety with  uneven surface and AD mgmt. Pt with short step length and decreased liliya that changes little with cueing.   Pt ambulates up and down cement ramp with RW and Supervision.     Advanced Gait:  Stairs: Pt asc/dec 4 steps x 4 trials with BHR support and supervision. Pt with seated rest between trials 2 and 3. Pt with slight flexed posture and step through pattern.     Wheelchair Mobility:  Patient propels w/c 150 ft on unit and on outdoor cement surface with mod-I.      Therex:  Pt pedaled the LBE x 10 minutes with BLE to improve endurance and strength.   Seated BLE therex: AP, LAQ, Seated Marching x 30 reps.     Balance:  Pt with good balance, with pt able to stand statically without AD and SBA/Supervision.     Additional Treatment:  Pt educated on benefits of participation with therapy.     Patient left supine with call button in reach.    Assessment:  Ayaan Ricks is a 73 y.o. male with a medical diagnosis of PVD (peripheral vascular disease) with claudication.  Pt tolerated tx well with focus on gait training on outdoor surfaces and ramps. Pt progressing well reaching a supervision level with most mobility. Pt will continue to benefit from skilled therapy to improve impairments listed below.     Rehab identified problem list/impairments: weakness, impaired endurance, gait instability, impaired balance, pain, edema    Rehab potential is good.    Activity tolerance: Good    Discharge recommendations: home health PT     Barriers to discharge: None    Equipment recommendations: bath bench, walker, rolling     GOALS:    Physical Therapy Goals        Problem: Physical Therapy Goal    Goal Priority Disciplines Outcome Goal Variances Interventions   Physical Therapy Goal     PT/OT, PT Ongoing (interventions implemented as appropriate)     Description:  Goals to be met by: 14 days     Patient will increase functional independence with mobility by performin. Sit to stand transfer with Modified [place]  2.  Bed to chair transfer with Modified [place] using Rolling Walker  3. Gait  x 150 feet with Modified [place] using Rolling Walker.   4. Wheelchair propulsion x300 feet with Modified [place] using bilateral uppper extremities  5. Ascend/descend 4 stair with right Handrails Supervision - met 4/20  6. Ascend/Descend 4 inch curb step with Supervision using Rolling Walker.  7. Lower extremity exercise program x20 reps per handout, with independence                       PLAN:    Patient to be seen  (5-6x/week)  to address the above listed problems via gait training, therapeutic activities, therapeutic exercises, neuromuscular re-education, wheelchair management/training  Plan of Care expires: 05/14/18  Plan of Care reviewed with: patient    Pa Sorenson, PTA  04/20/2018

## 2018-04-20 NOTE — PLAN OF CARE
Problem: Fall Risk (Adult)  Goal: Absence of Falls  Patient will demonstrate the desired outcomes by discharge/transition of care.   Outcome: Ongoing (interventions implemented as appropriate)  Utilizes nurse call light when assistance is needed. Call light remains within reach. Remains free of falls, injury or trauma. Will continue to monitor.

## 2018-04-20 NOTE — PT/OT/SLP PROGRESS
Occupational Therapy  Treatment    Ayaan Ricks   MRN: [id number]   Admitting Diagnosis: PVD (peripheral vascular disease) with claudication    OT Date of Treatment: 04/20/18  Total Time (min): 45 min    Billable Minutes:  Therapeutic Activity 20 and Therapeutic Exercise 25    General Precautions: Standard, fall  Orthopedic Precautions: N/A  Braces: N/A         Subjective:  Communicated with nsg prior to session.  I was hurting a lot earlier my urine was burning     Pain/Comfort  Pain Rating 1: 6/10  Location - Side 1: Left  Location 1: leg  Pain Addressed 1: Reposition, Distraction, Pre-medicate for activity  Pain Rating Post-Intervention 1: 6/10    Objective:   Pt. Supine on arrival    Functional Status:  MDS G  Bed Mobility Functional Status: mod(I) - (I) Supine <.> sit  Transfer Functional Status: CGA-Min (A) EOB <> w/c and over 3n1  Locomotion Off Unit Functional Status: S-SBA  Dressing Level of (A) : 1: Set up help only with donning pull over sweat shirt   Toilet Use Functional Status: S-SBA over 3n1 in standing with use of urinal   Personal Hygiene Functional Status: S-SBA at sink level  Moving from seated to standing position: Not steady, but able to stabilize without staff assistance           [facility] 6 Click:  [facility] Total Score: 20    OT Exercises: UE Ergometer 15 min  Rickshaw 10# 4 x 25 reps    Additional Treatment:  Pt. With w/c mobility from gym to room with (S)  Pt. With standing activity on this day at counter Pt. Unable to complete due to having urgency to use bathroom . Pt. With standing and therex performed to increase ROM, endurance selfcare task and fxl mobility for independence     Patient left supine with all lines intact and call button in reach    ASSESSMENT:  Ayaan Ricks is a 73 y.o. male with a medical diagnosis of PVD (peripheral vascular disease) with claudication Pt. participated well with session on this day.Pt demos physical deficits with balance  functional mobility, UB  strength, endurance  level of functional indep with daily tasks and activities and selfcare skills .Pt. Will continue to benefit from continued OT to progress towards goals  .    Rehab identified problem list/impairments: weakness, impaired endurance, impaired self care skills, impaired functional mobilty, gait instability, impaired balance, visual deficits, decreased lower extremity function, decreased safety awareness, pain, impaired coordination, impaired skin, edema    Rehab potential is fair    Activity tolerance: Fair    Discharge recommendations: home with home health     Barriers to discharge: None     Equipment recommendations: bedside commode, bath bench, walker, rolling     GOALS:    Occupational Therapy Goals        Problem: Occupational Therapy Goal    Goal Priority Disciplines Outcome Interventions   Occupational Therapy Goal     OT, PT/OT Ongoing (interventions implemented as appropriate)    Description:  Goals to be met by: 14 days     Patient will increase functional independence with ADLs by performing:    UE Dressing with Modified [place].  LE Dressing with Modified [place] using AD as needed.  Grooming while standing at sink with Modified [place].  Toileting from toilet with Modified [place] for hygiene and clothing management.   Bathing from  edge of bed with Modified [place].  Toilet transfer to toilet with Modified [place].  Upper extremity exercise program x15 reps per handout, with supervision to improve UE strength/endurance to facilitate func mobility  Stand for 10 minutes with mod I during functional activity in order to perform safe standing ADLs/iADLs                  Plan:  Patient to be seen  (5-6x/week) to address the above listed problems via self-care/home management, therapeutic activities, therapeutic exercises  Plan of Care expires: 05/14/18  Plan of Care reviewed with: patient    VENECIA Reid  04/20/2018

## 2018-04-20 NOTE — PLAN OF CARE
Problem: Physical Therapy Goal  Goal: Physical Therapy Goal  Goals to be met by: 14 days     Patient will increase functional independence with mobility by performin. Sit to stand transfer with Modified [place]  2. Bed to chair transfer with Modified [place] using Rolling Walker  3. Gait  x 150 feet with Modified [place] using Rolling Walker.   4. Wheelchair propulsion x300 feet with Modified [place] using bilateral uppper extremities  5. Ascend/descend 4 stair with right Handrails Supervision - met   6. Ascend/Descend 4 inch curb step with Supervision using Rolling Walker.  7. Lower extremity exercise program x20 reps per handout, with independence     Outcome: Ongoing (interventions implemented as appropriate)  One goal met this session. Goals remain appropriate.
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
## 2018-04-20 NOTE — PLAN OF CARE
Problem: Occupational Therapy Goal  Goal: Occupational Therapy Goal  Goals to be met by: 14 days     Patient will increase functional independence with ADLs by performing:    UE Dressing with Modified [place].  LE Dressing with Modified [place] using AD as needed.  Grooming while standing at sink with Modified [place].  Toileting from toilet with Modified [place] for hygiene and clothing management.   Bathing from  edge of bed with Modified [place].  Toilet transfer to toilet with Modified [place].  Upper extremity exercise program x15 reps per handout, with supervision to improve UE strength/endurance to facilitate func mobility  Stand for 10 minutes with mod I during functional activity in order to perform safe standing ADLs/iADLs     Outcome: Ongoing (interventions implemented as appropriate)  .

## 2018-04-21 LAB — BACTERIA UR CULT: NO GROWTH

## 2018-04-21 PROCEDURE — 25000003 PHARM REV CODE 250: Performed by: STUDENT IN AN ORGANIZED HEALTH CARE EDUCATION/TRAINING PROGRAM

## 2018-04-21 PROCEDURE — 25000003 PHARM REV CODE 250: Performed by: NURSE PRACTITIONER

## 2018-04-21 PROCEDURE — 97530 THERAPEUTIC ACTIVITIES: CPT

## 2018-04-21 PROCEDURE — 25000003 PHARM REV CODE 250: Performed by: INTERNAL MEDICINE

## 2018-04-21 PROCEDURE — 25000242 PHARM REV CODE 250 ALT 637 W/ HCPCS: Performed by: STUDENT IN AN ORGANIZED HEALTH CARE EDUCATION/TRAINING PROGRAM

## 2018-04-21 PROCEDURE — 97116 GAIT TRAINING THERAPY: CPT

## 2018-04-21 PROCEDURE — 63600175 PHARM REV CODE 636 W HCPCS: Performed by: INTERNAL MEDICINE

## 2018-04-21 PROCEDURE — 97110 THERAPEUTIC EXERCISES: CPT

## 2018-04-21 PROCEDURE — 12000000 HC SNF SEMI-PRIVATE ROOM

## 2018-04-21 RX ADMIN — OXYCODONE HYDROCHLORIDE 5 MG: 5 TABLET ORAL at 09:04

## 2018-04-21 RX ADMIN — ROSUVASTATIN CALCIUM 20 MG: 10 TABLET, FILM COATED ORAL at 09:04

## 2018-04-21 RX ADMIN — STANDARDIZED SENNA CONCENTRATE AND DOCUSATE SODIUM 1 TABLET: 8.6; 5 TABLET, FILM COATED ORAL at 10:04

## 2018-04-21 RX ADMIN — TIOTROPIUM BROMIDE 18 MCG: 18 CAPSULE ORAL; RESPIRATORY (INHALATION) at 09:04

## 2018-04-21 RX ADMIN — FERROUS SULFATE TAB EC 325 MG (65 MG FE EQUIVALENT) 325 MG: 325 (65 FE) TABLET DELAYED RESPONSE at 09:04

## 2018-04-21 RX ADMIN — HEPARIN SODIUM 5000 UNITS: 5000 INJECTION, SOLUTION INTRAVENOUS; SUBCUTANEOUS at 03:04

## 2018-04-21 RX ADMIN — DORZOLAMIDE HYDROCHLORIDE 1 DROP: 20 SOLUTION/ DROPS OPHTHALMIC at 09:04

## 2018-04-21 RX ADMIN — VALSARTAN 80 MG: 80 TABLET ORAL at 09:04

## 2018-04-21 RX ADMIN — HEPARIN SODIUM 5000 UNITS: 5000 INJECTION, SOLUTION INTRAVENOUS; SUBCUTANEOUS at 05:04

## 2018-04-21 RX ADMIN — TIMOLOL MALEATE 1 DROP: 5 SOLUTION OPHTHALMIC at 09:04

## 2018-04-21 RX ADMIN — LATANOPROST 1 DROP: 50 SOLUTION OPHTHALMIC at 10:04

## 2018-04-21 RX ADMIN — HEPARIN SODIUM 5000 UNITS: 5000 INJECTION, SOLUTION INTRAVENOUS; SUBCUTANEOUS at 10:04

## 2018-04-21 RX ADMIN — OXYCODONE HYDROCHLORIDE 5 MG: 5 TABLET ORAL at 10:04

## 2018-04-21 RX ADMIN — TAMSULOSIN HYDROCHLORIDE 0.8 MG: 0.4 CAPSULE ORAL at 10:04

## 2018-04-21 RX ADMIN — OXYCODONE HYDROCHLORIDE 5 MG: 5 TABLET ORAL at 01:04

## 2018-04-21 RX ADMIN — TIMOLOL MALEATE 1 DROP: 5 SOLUTION OPHTHALMIC at 10:04

## 2018-04-21 RX ADMIN — CLOPIDOGREL BISULFATE 75 MG: 75 TABLET ORAL at 09:04

## 2018-04-21 RX ADMIN — RAMELTEON 8 MG: 8 TABLET, FILM COATED ORAL at 10:04

## 2018-04-21 RX ADMIN — DORZOLAMIDE HYDROCHLORIDE 1 DROP: 20 SOLUTION/ DROPS OPHTHALMIC at 10:04

## 2018-04-21 RX ADMIN — FLUCONAZOLE 200 MG: 200 TABLET ORAL at 09:04

## 2018-04-21 RX ADMIN — OXYCODONE HYDROCHLORIDE 5 MG: 5 TABLET ORAL at 06:04

## 2018-04-21 RX ADMIN — STANDARDIZED SENNA CONCENTRATE AND DOCUSATE SODIUM 1 TABLET: 8.6; 5 TABLET, FILM COATED ORAL at 09:04

## 2018-04-21 NOTE — PLAN OF CARE
Problem: Patient Care Overview  Goal: Plan of Care Review   04/20/18 2251   Coping/Psychosocial   Plan Of Care Reviewed With patient       Problem: Fall Risk (Adult)  Goal: Identify Related Risk Factors and Signs and Symptoms  Related risk factors and signs and symptoms are identified upon initiation of Human Response Clinical Practice Guideline (CPG)   Outcome: Ongoing (interventions implemented as appropriate)   04/20/18 7451   Fall Risk   Related Risk Factors (Fall Risk) age-related changes;gait/mobility problems;fatigue/slow reaction;neuro disease/injury

## 2018-04-21 NOTE — PLAN OF CARE
Problem: Physical Therapy Goal  Goal: Physical Therapy Goal  Goals to be met by: 14 days     Patient will increase functional independence with mobility by performin. Sit to stand transfer with Modified [place]  2. Bed to chair transfer with Modified [place] using Rolling Walker  3. Gait  x 150 feet with Modified [place] using Rolling Walker.   4. Wheelchair propulsion x300 feet with Modified [place] using bilateral uppper extremities  5. Ascend/descend 4 stair with right Handrails Supervision - met   6. Ascend/Descend 4 inch curb step with Supervision using Rolling Walker.  7. Lower extremity exercise program x20 reps per handout, with independence      Outcome: Ongoing (interventions implemented as appropriate)  Goals remain appropriate

## 2018-04-21 NOTE — PT/OT/SLP PROGRESS
"Physical Therapy  Treatment    Ayaan Ricks   MRN: [id number]   Admitting Diagnosis: PVD (peripheral vascular disease) with claudication    PT Received On: 04/21/18  Total Time (min): 55       Billable Minutes:55    Gait Training 15, Therapeutic Activity 10 and Therapeutic Exercise 30    Treatment Type: Treatment  PT/PTA: PTA     PTA Visit Number: 5       General Precautions: Standard,    Orthopedic Precautions:     Braces:      Do you have any cultural, spiritual, Jain conflicts, given your current situation?: none    Subjective:  Communicated with nsg prior to session.re pts c/o pain requesting meds, agreeable on second attempt, meds taken  "glad we are going now, [place] come on at 4pm"    Pain/Comfort  Pain Rating 1: 0/10  Pain Addressed 1: Pre-medicate for activity  Pain Rating Post-Intervention 1: 0/10    Objective:  Patient found in bed     Functional Status:  MDS G  Bed Mobility Functional Status: mod(I) - (I)  Transfer Functional Status: S-SBA  Walk in Corridor Functional Status: S-SBA          AM-PAC 6 CLICK MOBILITY  Total Score:20    Bed Mobility:  Sit>Supine:mod I  Supine>Sit: mod I    Transfers:  Sit<>Stand: mod I with RW  Stand Pivot Transfer: mod I no AD EOB<>WC      Gait:  Amb with RW S ~ 270 ft and 200 ft with seated rest break vcs to take his time negotiating turn for inc safety    Advanced Gait:  Stairs: asd/dec 4 steps x 3 trials BHR S    Therex:  2x15 reps AP, GS,QS,LAQ,hip flex,abd/add    Additional Treatment:  LBE x 10 min  Rows/bicep curls 2 x 25 reps RTB #1 dowel    Patient left supine with call button in reach and belongings in reach.    Assessment:  Ayaan Ricks is a 73 y.o. male with a medical diagnosis of PVD (peripheral vascular disease) with claudication.  Pt tolerated well, pt would continue to benefit from skilled PT services to improve overall functional mobility, strength and endurance.  .    Rehab identified problem list/impairments: weakness, impaired endurance, " gait instability, impaired balance, pain, edema    Rehab potential is good.    Activity tolerance: Fair    Discharge recommendations: home health PT     Barriers to discharge: None    Equipment recommendations: bath bench, walker, rolling     GOALS:    Physical Therapy Goals        Problem: Physical Therapy Goal    Goal Priority Disciplines Outcome Goal Variances Interventions   Physical Therapy Goal     PT/OT, PT Ongoing (interventions implemented as appropriate)     Description:  Goals to be met by: 14 days     Patient will increase functional independence with mobility by performin. Sit to stand transfer with Modified [place]  2. Bed to chair transfer with Modified [place] using Rolling Walker  3. Gait  x 150 feet with Modified [place] using Rolling Walker.   4. Wheelchair propulsion x300 feet with Modified [place] using bilateral uppper extremities  5. Ascend/descend 4 stair with right Handrails Supervision - met   6. Ascend/Descend 4 inch curb step with Supervision using Rolling Walker.  7. Lower extremity exercise program x20 reps per handout, with independence                       PLAN:    Patient to be seen  (5-6x/week)  to address the above listed problems via gait training, therapeutic activities, therapeutic exercises, neuromuscular re-education, wheelchair management/training  Plan of Care expires: 18  Plan of Care reviewed with: patient    Eulalia Dumont, PTA  2018

## 2018-04-22 PROCEDURE — 97535 SELF CARE MNGMENT TRAINING: CPT

## 2018-04-22 PROCEDURE — 63600175 PHARM REV CODE 636 W HCPCS: Performed by: INTERNAL MEDICINE

## 2018-04-22 PROCEDURE — 12000000 HC SNF SEMI-PRIVATE ROOM

## 2018-04-22 PROCEDURE — 25000003 PHARM REV CODE 250: Performed by: STUDENT IN AN ORGANIZED HEALTH CARE EDUCATION/TRAINING PROGRAM

## 2018-04-22 PROCEDURE — 25000242 PHARM REV CODE 250 ALT 637 W/ HCPCS: Performed by: STUDENT IN AN ORGANIZED HEALTH CARE EDUCATION/TRAINING PROGRAM

## 2018-04-22 PROCEDURE — 25000003 PHARM REV CODE 250: Performed by: INTERNAL MEDICINE

## 2018-04-22 PROCEDURE — 97110 THERAPEUTIC EXERCISES: CPT

## 2018-04-22 PROCEDURE — 25000003 PHARM REV CODE 250: Performed by: NURSE PRACTITIONER

## 2018-04-22 PROCEDURE — 97530 THERAPEUTIC ACTIVITIES: CPT

## 2018-04-22 RX ADMIN — STANDARDIZED SENNA CONCENTRATE AND DOCUSATE SODIUM 1 TABLET: 8.6; 5 TABLET, FILM COATED ORAL at 10:04

## 2018-04-22 RX ADMIN — DORZOLAMIDE HYDROCHLORIDE 1 DROP: 20 SOLUTION/ DROPS OPHTHALMIC at 10:04

## 2018-04-22 RX ADMIN — HEPARIN SODIUM 5000 UNITS: 5000 INJECTION, SOLUTION INTRAVENOUS; SUBCUTANEOUS at 06:04

## 2018-04-22 RX ADMIN — DORZOLAMIDE HYDROCHLORIDE 1 DROP: 20 SOLUTION/ DROPS OPHTHALMIC at 09:04

## 2018-04-22 RX ADMIN — FERROUS SULFATE TAB EC 325 MG (65 MG FE EQUIVALENT) 325 MG: 325 (65 FE) TABLET DELAYED RESPONSE at 10:04

## 2018-04-22 RX ADMIN — OXYCODONE HYDROCHLORIDE 5 MG: 5 TABLET ORAL at 09:04

## 2018-04-22 RX ADMIN — HEPARIN SODIUM 5000 UNITS: 5000 INJECTION, SOLUTION INTRAVENOUS; SUBCUTANEOUS at 10:04

## 2018-04-22 RX ADMIN — HEPARIN SODIUM 5000 UNITS: 5000 INJECTION, SOLUTION INTRAVENOUS; SUBCUTANEOUS at 02:04

## 2018-04-22 RX ADMIN — TIMOLOL MALEATE 1 DROP: 5 SOLUTION OPHTHALMIC at 10:04

## 2018-04-22 RX ADMIN — ROSUVASTATIN CALCIUM 20 MG: 10 TABLET, FILM COATED ORAL at 10:04

## 2018-04-22 RX ADMIN — OXYCODONE HYDROCHLORIDE 5 MG: 5 TABLET ORAL at 03:04

## 2018-04-22 RX ADMIN — CLOPIDOGREL BISULFATE 75 MG: 75 TABLET ORAL at 10:04

## 2018-04-22 RX ADMIN — TIMOLOL MALEATE 1 DROP: 5 SOLUTION OPHTHALMIC at 09:04

## 2018-04-22 RX ADMIN — FLUCONAZOLE 200 MG: 200 TABLET ORAL at 10:04

## 2018-04-22 RX ADMIN — TIOTROPIUM BROMIDE 18 MCG: 18 CAPSULE ORAL; RESPIRATORY (INHALATION) at 09:04

## 2018-04-22 RX ADMIN — TAMSULOSIN HYDROCHLORIDE 0.8 MG: 0.4 CAPSULE ORAL at 10:04

## 2018-04-22 RX ADMIN — LATANOPROST 1 DROP: 50 SOLUTION OPHTHALMIC at 10:04

## 2018-04-22 RX ADMIN — OXYCODONE HYDROCHLORIDE 5 MG: 5 TABLET ORAL at 02:04

## 2018-04-22 NOTE — PLAN OF CARE
Problem: Occupational Therapy Goal  Goal: Occupational Therapy Goal  Goals to be met by: 14 days     Patient will increase functional independence with ADLs by performing:    UE Dressing with Modified [place].  Met  LE Dressing with Modified [place] using AD as needed.  Grooming while standing at sink with Modified [place].  Toileting from toilet with Modified [place] for hygiene and clothing management.   Bathing from  edge of bed with Modified [place].  Toilet transfer to toilet with Modified [place].  Upper extremity exercise program x15 reps per handout, with supervision to improve UE strength/endurance to facilitate func mobility  Stand for 10 minutes with mod I during functional activity in order to perform safe standing ADLs/iADLs     Outcome: Ongoing (interventions implemented as appropriate)  Romero Rees, OTR/L      4/22/2018

## 2018-04-22 NOTE — PT/OT/SLP PROGRESS
Occupational Therapy  Treatment    Ayaan Ricks   MRN: [id number]   Admitting Diagnosis: PVD (peripheral vascular disease) with claudication    OT Date of Treatment: 04/22/18  Total Time (min): 55 min    Billable Minutes:  Self Care/Home Management 20, Therapeutic Activity 18 and Therapeutic Exercise 17    General Precautions: Standard, fall  Orthopedic Precautions: N/A  Braces: N/A         Subjective:  Communicated with nurse prior to session.      Pain/Comfort  Pain Rating 1: 0/10  Pain Rating Post-Intervention 1: 0/10    Objective:  Patient found with:  (no lines)    Functional Status:  MDS G  Bed Mobility Functional Status:  (No assist)    Transfer Functional Status: S-SBA (for safety when standing and perforoming SPTs)    Walk in Room Functional Status: S-SBA (ambulating with RW 20ft from EOB to stand sinkside. )    Dressing Functional Status: S-SBA (pt needing (S) when performing standing portion of LBD.)    Eating Functional Status: Mod(I)  (no assist)    Toilet Use Functional Status: S-SBA (when standing. Pt usinf RW and BSC over toilet for safety.)    Personal Hygiene Functional Status: S-SBA (standing sinkside to perform grooming.)      Moving from seated to standing position: Not steady, but able to stabilize without staff assistance  Walking (with assistive device if used): Not steady, but able to stabilize without staff assistance (RW to steady when standing.)  Turning around and facing the opposite direction while walking: Not steady, but able to stabilize without staff assistance  Moving on and off the toilet: Not steady, but able to stabilize without staff assistance  Surface-to-surface transfer (transfer between bed and chair or wheelchair): Not steady, but able to stabilize without staff assistance           AMPAC 6 Click:  AMPAC Total Score: 20    OT Exercises: UE Ergometer performed 15 minutes with mod resistance.  Rowing performed 5 sets 20 reps with 25 pounds.  UE exercises performed to  increase functional endurance and strength.  Strengthening required in order increase independence when performing self care tasks, W/C propulsion , functional standing activities as well as when performing functional tasks.    Additional Treatment:    -Locomotion on Unit Functional Status: Mod(I)  (Pt propelled W/C from restroom to therapy gym without assist  163 ft .)    - Pt completed functional mobility, transfers and self care tasks as listed above.  - OT received POC with Patient    Patient left up in chair with call button in reach and nurse notified    ASSESSMENT:  Ayaan Ricks is a 73 y.o. male with a medical diagnosis of PVD (peripheral vascular disease) with claudication . Pt was agreeable to OT and tolerated Tx without incident but continues to require (A) to perform functional activities to completion. OT intervention required to address performance deficits affecting performance of self care tasks, functional mobility, functional transfers, functional strength and endurance.  Pt is making progress but continues to require OT Intervention to perform functional transfers, functional standing activities, and self care tasks with standing component more independently. OT is recommended to further his functional (I)ce and safety. Goals remain appropriate and continued OT is recommended.      Rehab identified problem list/impairments: weakness, impaired endurance, impaired self care skills, impaired functional mobilty, gait instability, impaired balance, visual deficits, decreased lower extremity function, decreased safety awareness, pain, impaired coordination, impaired skin, edema    Rehab potential is good    Activity tolerance: Good    Discharge recommendations: home with home health     Barriers to discharge: None     Equipment recommendations: bedside commode, bath bench, walker, rolling     GOALS:    Occupational Therapy Goals        Problem: Occupational Therapy Goal    Goal Priority Disciplines  Outcome Interventions   Occupational Therapy Goal     OT, PT/OT Ongoing (interventions implemented as appropriate)    Description:  Goals to be met by: 14 days     Patient will increase functional independence with ADLs by performing:    UE Dressing with Modified [place].  Met  LE Dressing with Modified [place] using AD as needed.  Grooming while standing at sink with Modified [place].  Toileting from toilet with Modified [place] for hygiene and clothing management.   Bathing from  edge of bed with Modified [place].  Toilet transfer to toilet with Modified [place].  Upper extremity exercise program x15 reps per handout, with supervision to improve UE strength/endurance to facilitate func mobility  Stand for 10 minutes with mod I during functional activity in order to perform safe standing ADLs/iADLs                       Plan:  Patient to be seen  (5-6x/week) to address the above listed problems via self-care/home management, therapeutic activities, therapeutic exercises  Plan of Care expires: 05/14/18  Plan of Care reviewed with: patient    SIMI Francisco/LINDSEY  04/22/2018

## 2018-04-23 VITALS
RESPIRATION RATE: 18 BRPM | DIASTOLIC BLOOD PRESSURE: 62 MMHG | BODY MASS INDEX: 15.21 KG/M2 | HEART RATE: 100 BPM | TEMPERATURE: 98 F | WEIGHT: 106.25 LBS | SYSTOLIC BLOOD PRESSURE: 100 MMHG | OXYGEN SATURATION: 99 % | HEIGHT: 70 IN

## 2018-04-23 LAB
ANION GAP SERPL CALC-SCNC: 9 MMOL/L
BASOPHILS # BLD AUTO: 0.02 K/UL
BASOPHILS NFR BLD: 0.4 %
BUN SERPL-MCNC: 29 MG/DL
CALCIUM SERPL-MCNC: 9.2 MG/DL
CHLORIDE SERPL-SCNC: 101 MMOL/L
CO2 SERPL-SCNC: 24 MMOL/L
CREAT SERPL-MCNC: 1.3 MG/DL
DIFFERENTIAL METHOD: ABNORMAL
EOSINOPHIL # BLD AUTO: 0.1 K/UL
EOSINOPHIL NFR BLD: 1.7 %
ERYTHROCYTE [DISTWIDTH] IN BLOOD BY AUTOMATED COUNT: 14.6 %
EST. GFR  (AFRICAN AMERICAN): >60 ML/MIN/1.73 M^2
EST. GFR  (NON AFRICAN AMERICAN): 54.1 ML/MIN/1.73 M^2
GLUCOSE SERPL-MCNC: 74 MG/DL
HCT VFR BLD AUTO: 26.7 %
HGB BLD-MCNC: 8.6 G/DL
IMM GRANULOCYTES # BLD AUTO: 0.02 K/UL
IMM GRANULOCYTES NFR BLD AUTO: 0.4 %
LYMPHOCYTES # BLD AUTO: 1.1 K/UL
LYMPHOCYTES NFR BLD: 24.8 %
MAGNESIUM SERPL-MCNC: 1.5 MG/DL
MCH RBC QN AUTO: 30.3 PG
MCHC RBC AUTO-ENTMCNC: 32.2 G/DL
MCV RBC AUTO: 94 FL
MONOCYTES # BLD AUTO: 0.7 K/UL
MONOCYTES NFR BLD: 15.5 %
NEUTROPHILS # BLD AUTO: 2.6 K/UL
NEUTROPHILS NFR BLD: 57.2 %
NRBC BLD-RTO: 0 /100 WBC
PHOSPHATE SERPL-MCNC: 4.7 MG/DL
PLATELET # BLD AUTO: 352 K/UL
PMV BLD AUTO: 8.9 FL
POTASSIUM SERPL-SCNC: 4.7 MMOL/L
RBC # BLD AUTO: 2.84 M/UL
SODIUM SERPL-SCNC: 134 MMOL/L
WBC # BLD AUTO: 4.59 K/UL

## 2018-04-23 PROCEDURE — 85025 COMPLETE CBC W/AUTO DIFF WBC: CPT

## 2018-04-23 PROCEDURE — 25000242 PHARM REV CODE 250 ALT 637 W/ HCPCS: Performed by: STUDENT IN AN ORGANIZED HEALTH CARE EDUCATION/TRAINING PROGRAM

## 2018-04-23 PROCEDURE — 84100 ASSAY OF PHOSPHORUS: CPT

## 2018-04-23 PROCEDURE — 25000003 PHARM REV CODE 250: Performed by: NURSE PRACTITIONER

## 2018-04-23 PROCEDURE — 80048 BASIC METABOLIC PNL TOTAL CA: CPT

## 2018-04-23 PROCEDURE — 99316 NF DSCHRG MGMT 30 MIN+: CPT | Mod: ,,, | Performed by: HOSPITALIST

## 2018-04-23 PROCEDURE — 83735 ASSAY OF MAGNESIUM: CPT

## 2018-04-23 PROCEDURE — 36415 COLL VENOUS BLD VENIPUNCTURE: CPT

## 2018-04-23 PROCEDURE — 63600175 PHARM REV CODE 636 W HCPCS: Performed by: INTERNAL MEDICINE

## 2018-04-23 PROCEDURE — 25000003 PHARM REV CODE 250: Performed by: STUDENT IN AN ORGANIZED HEALTH CARE EDUCATION/TRAINING PROGRAM

## 2018-04-23 PROCEDURE — 25000003 PHARM REV CODE 250: Performed by: INTERNAL MEDICINE

## 2018-04-23 RX ORDER — LANOLIN ALCOHOL/MO/W.PET/CERES
400 CREAM (GRAM) TOPICAL 2 TIMES DAILY
Status: DISCONTINUED | OUTPATIENT
Start: 2018-04-23 | End: 2018-04-23 | Stop reason: HOSPADM

## 2018-04-23 RX ORDER — AMLODIPINE BESYLATE 5 MG/1
5 TABLET ORAL DAILY
Start: 2018-04-23 | End: 2019-01-29 | Stop reason: ALTCHOICE

## 2018-04-23 RX ORDER — OXYCODONE HYDROCHLORIDE 5 MG/1
5 TABLET ORAL EVERY 4 HOURS PRN
Qty: 35 TABLET | Refills: 0 | Status: SHIPPED | OUTPATIENT
Start: 2018-04-23 | End: 2018-04-24

## 2018-04-23 RX ORDER — LANOLIN ALCOHOL/MO/W.PET/CERES
400 CREAM (GRAM) TOPICAL 2 TIMES DAILY
Refills: 0 | COMMUNITY
Start: 2018-04-23 | End: 2018-04-26

## 2018-04-23 RX ORDER — VALSARTAN AND HYDROCHLOROTHIAZIDE 160; 25 MG/1; MG/1
1 TABLET ORAL DAILY
Start: 2018-04-23 | End: 2019-01-29 | Stop reason: ALTCHOICE

## 2018-04-23 RX ORDER — TIOTROPIUM BROMIDE 18 UG/1
1 CAPSULE ORAL; RESPIRATORY (INHALATION) DAILY
Qty: 30 CAPSULE | Refills: 1 | Status: SHIPPED | OUTPATIENT
Start: 2018-04-24 | End: 2019-01-29

## 2018-04-23 RX ADMIN — HEPARIN SODIUM 5000 UNITS: 5000 INJECTION, SOLUTION INTRAVENOUS; SUBCUTANEOUS at 05:04

## 2018-04-23 RX ADMIN — Medication 400 MG: at 12:04

## 2018-04-23 RX ADMIN — STANDARDIZED SENNA CONCENTRATE AND DOCUSATE SODIUM 1 TABLET: 8.6; 5 TABLET, FILM COATED ORAL at 08:04

## 2018-04-23 RX ADMIN — DORZOLAMIDE HYDROCHLORIDE 1 DROP: 20 SOLUTION/ DROPS OPHTHALMIC at 08:04

## 2018-04-23 RX ADMIN — CLOPIDOGREL BISULFATE 75 MG: 75 TABLET ORAL at 08:04

## 2018-04-23 RX ADMIN — FLUCONAZOLE 200 MG: 200 TABLET ORAL at 08:04

## 2018-04-23 RX ADMIN — ROSUVASTATIN CALCIUM 20 MG: 10 TABLET, FILM COATED ORAL at 08:04

## 2018-04-23 RX ADMIN — TIMOLOL MALEATE 1 DROP: 5 SOLUTION OPHTHALMIC at 08:04

## 2018-04-23 RX ADMIN — FERROUS SULFATE TAB EC 325 MG (65 MG FE EQUIVALENT) 325 MG: 325 (65 FE) TABLET DELAYED RESPONSE at 08:04

## 2018-04-23 RX ADMIN — TIOTROPIUM BROMIDE 18 MCG: 18 CAPSULE ORAL; RESPIRATORY (INHALATION) at 08:04

## 2018-04-23 RX ADMIN — VALSARTAN 80 MG: 80 TABLET ORAL at 08:04

## 2018-04-23 NOTE — ASSESSMENT & PLAN NOTE
· S/p left SFA to posterior tibial bypass  · Continue medical therapy with rosuvastatin, plavix  · See plan below.

## 2018-04-23 NOTE — ASSESSMENT & PLAN NOTE
· Patient started on iron therapy when hospitalized which will be continued  · Monitor H/H with twice weekly labs

## 2018-04-23 NOTE — ASSESSMENT & PLAN NOTE
· With dyspnea while admitted, neg CXR and started on titropium  · Patient oxygenating well and report intermittent dyspnea on exertion   · F/U with PCP for w/u for underlying obstructive disease  · Continue titropium inhalation.  · Monitor respiratory status.

## 2018-04-23 NOTE — PLAN OF CARE
Problem: Fall Risk (Adult)  Goal: Identify Related Risk Factors and Signs and Symptoms  Related risk factors and signs and symptoms are identified upon initiation of Human Response Clinical Practice Guideline (CPG)   Outcome: Ongoing (interventions implemented as appropriate)   04/23/18 0249   Fall Risk   Related Risk Factors (Fall Risk) fatigue/slow reaction;gait/mobility problems

## 2018-04-23 NOTE — NURSING
Instructed pt on home discharge orders, medication and importance of follow up appt. Handed pt pain med prescription.instructed pt on importance to hold blood pressure medications per NP.  Pt verbalized understanding. Transported via w/c to front entrance for discharge to home with visitor and home health

## 2018-04-23 NOTE — ASSESSMENT & PLAN NOTE
· Chronic with SBP lows  · Currently holding both amlodipine and valsartan/hctz  · Encourage oral fluids  · F/u with PCP to determine restarting medications

## 2018-04-23 NOTE — PLAN OF CARE
Problem: Patient Care Overview  Goal: Plan of Care Review  Outcome: Revised  Repositions independently, no new skin breakdowns noted. Left leg incision dermabond dry and intact. No redness. Afebrile. Monitored for pain and safety. Safety maintained. Denies pain

## 2018-04-23 NOTE — ASSESSMENT & PLAN NOTE
· Urine appears infected by U/A  · Start bactrim therapy but was discontinued for AVE and switched to cipro, cipro was discontinued and started on diflucan on yeast  · Patient will need khan change if culture + but will also do a voiding trial at that time to potentially prevent re-insertion.  · Repeat UA and culture negative for infection (collected after removal of khan)

## 2018-04-23 NOTE — ASSESSMENT & PLAN NOTE
· Macias d/c by urology and patient was able to void without difficulty after removal   · Continue therapy with tamsulosin which patient takes chronically

## 2018-04-23 NOTE — PLAN OF CARE
[facility] HOME HEALTH ORDERS  FACE TO FACE ENCOUNTER    Patient Name: Ayaan Ricks  YOB: 1944    PCP: [facility]   PCP Address: [address] / [place] [address]  PCP Phone Number: [phone]  PCP Fax: [phone]    Encounter Date: 04/23/2018    Admit to Home Health    Diagnoses:  Active Hospital Problems    Diagnosis  POA    *PVD (peripheral vascular disease) with claudication [I73.9]  Yes    Malnutrition [E46]  Yes    AVE (acute kidney injury) [N17.9]  Unknown    S/P femoral-tibial bypass [Z98.890]  Not Applicable    Tachycardia [R00.0]  Yes    Acute urinary obstruction [N13.9]  Yes    Acute blood loss anemia [D62]  Yes    Infection associated with indwelling urinary catheter [T83.511A]  Yes    Ex-heavy cigarette smoker (20-39 per day) [Z87.891]  Not Applicable    Essential hypertension [I10]  Yes      Resolved Hospital Problems    Diagnosis Date Resolved POA   No resolved problems to display.       Future Appointments  Date Time Provider Department Center   5/1/2018 1:00 PM VASCULAR, LAB [facility] VASCLAB [facility]   5/1/2018 2:00 PM VASCULAR, LAB [facility]   5/1/2018 2:45 PM BARTOLOME Mahmood III, MD [facility] VASCSUR [facility]   5/1/2018 3:20 PM [facility]   5/28/2018 2:40 PM Jessica Claire PA-C [facility] UROLOGY [facility]     Follow-up Information     BARTOLOME Mahmood Iii, MD. Go on 5/1/2018.    Specialty:  Vascular Surgery  Contact information:  [address]  [facility] [address]  [phone]             Jessica Claire PA-C. Go on 5/28/2018.    Specialty:  Urology  Contact information:  [address]  [facility] [address]  [phone]             Schedule an appointment as soon as possible for a visit with [facility].    Contact information:  [address]  [facility] [id number] [phone]               I have seen and examined this patient face to face today. My  clinical findings that support the need for the home health skilled services and home bound status are the following:  Weakness/numbness causing balance and gait disturbance due to Weakness/Debility and Surgery making it taxing to leave home.    Allergies:  Review of patient's allergies indicates:   Allergen Reactions    Shellfish containing products Nausea And Vomiting     PT REPORTS THAT HE HAS NAUSEA AND VOMITING AFTER EATING SHELLFISH. DENIES SOB, FACIAL, MOUTH OR TONGUE SWELLING       Diet: cardiac diet    Activities: activity as tolerated and no lifting, Driving, or Strenuous exercise     Nursing:   SN to complete comprehensive assessment including routine vital signs. Instruct on disease process and s/s of complications to report to MD. Review/verify medication list sent home with the patient at time of discharge  and instruct patient/caregiver as needed. Frequency may be adjusted depending on start of care date.    Notify MD if SBP > 160 or < 90; DBP > 90 or < 50; HR > 120 or < 50; Temp > 101      CONSULTS:    Physical Therapy to evaluate and treat. Evaluate for home safety and equipment needs; Establish/upgrade home exercise program. Perform / instruct on therapeutic exercises, gait training, transfer training, and Range of Motion.  Occupational Therapy to evaluate and treat. Evaluate home environment for safety and equipment needs. Perform/Instruct on transfers, ADL training, ROM, and therapeutic exercises.  Aide to provide assistance with personal care, ADLs, and vital signs.    MISCELLANEOUS CARE:  N/A    WOUND CARE ORDERS  n/a      Medications: Review discharge medications with patient and family and provide education.      Current Discharge Medication List      START taking these medications    Details   oxyCODONE (ROXICODONE) 5 MG immediate release tablet Take 1 tablet (5 mg total) by mouth every 4 (four) hours as needed for Pain.  Qty: 35 tablet, Refills: 0      tiotropium (SPIRIVA) 18 mcg  inhalation capsule Inhale 1 capsule (18 mcg total) into the lungs once daily. Controller  Qty: 30 capsule, Refills: 1         CONTINUE these medications which have CHANGED    Details   amLODIPine (NORVASC) 5 MG tablet Take 1 tablet (5 mg total) by mouth once daily. DO NOT take until resumed by PCP      valsartan-hydrochlorothiazide (DIOVAN-HCT) 160-25 mg per tablet Take 1 tablet by mouth once daily. DO NOT take until resumed by PCP         CONTINUE these medications which have NOT CHANGED    Details   clopidogrel (PLAVIX) 75 mg tablet Take 75 mg by mouth once daily.      dorzolamide-timolol 2-0.5% (COSOPT) 22.3-6.8 mg/mL ophthalmic solution Place 1 drop into the right eye 2 (two) times daily.      ferrous sulfate 324 mg (65 mg iron) TbEC Take 325 mg by mouth once daily.      latanoprost 0.005 % ophthalmic solution Place 1 drop into the right eye every evening.      rosuvastatin (CRESTOR) 20 MG tablet Take 20 mg by mouth once daily.      tamsulosin (FLOMAX) 0.4 mg Cp24 Take 0.4 mg by mouth once daily.      senna-docusate 8.6-50 mg (PERICOLACE) 8.6-50 mg per tablet Take 1 tablet by mouth 2 (two) times daily.         STOP taking these medications       hydrocodone-acetaminophen 5-325mg (NORCO) 5-325 mg per tablet Comments:   Reason for Stopping:         hydrocodone-acetaminophen 5-325mg (NORCO) 5-325 mg per tablet Comments:   Reason for Stopping:               I certify that this patient is confined to his home and needs intermittent skilled nursing care, physical therapy and occupational therapy.

## 2018-04-23 NOTE — ASSESSMENT & PLAN NOTE
· dietitian consulted  · Continue cardiac diet  · Discussed with TATUM Medina to register patient for Meals on Wheels program

## 2018-04-23 NOTE — ASSESSMENT & PLAN NOTE
· AVE after receiving bactrim  · Bactrim has since been stopped  · Cr on 4/15 was 1.6 and was treated with 1L of NS  · Today Cr 1.3--improved

## 2018-04-23 NOTE — ASSESSMENT & PLAN NOTE
· Improving  · Patient with minimal pain to left leg  · Continue oxycodone prn   · Doppler checks every shift  · continue PT/OT to increase ambulation, ADL performance and endurance  · continue heparin for DVT prophylaxis  · continue fall precautions  · continue senokot-s to prevent constipation; hold for frequent or loose stooling  · F/u with Dr. Mahmood

## 2018-04-23 NOTE — DISCHARGE SUMMARY
[facility]  Department of Hospital Medicine  Discharge Summary      Patient Name: Ayaan Ricks  MRN: [id number]  Admission Date: 4/13/2018  Hospital Length of Stay: 10 days  Discharge Date and Time: 4/23/2018  1:27 PM  Attending Physician: Rosa Damon MD  Discharging Provider: Tosin Bustillo NP  Primary Care Provider: [facility]    Chief Complaint/Reason for Admission: PVD (peripheral vascular disease) with claudication    History of Present Illness:  Patient is a 73 y.o. male with COPD, HTN, BPH who presents to SNF after left distal SFA to posterior tibial bypass on 4/9 by Dr. Mahmood to treat PVD with rest pain. The patient has been having intermittent tachycardia since surgery.  No albuterol given but started on spiriva; long smoking history but quit 6 months ago.  He denies SOB or wheezing. He complains of 10/10 pain to urethral meatus with khan catheter.  This started 2-3 days in the hospital after intermittent catheterization but has been worsening.  Khan placed on 4/12.  Oxycodone does relieve the pain but does not last the 6 hour duration.  He denies suprapubic pain or spasms. He complains of only soreness to his left leg and no pain.  The patient has been admitted to SNF for ongoing PT/OT due to insufficient progress to go home safely from the hospital.    Hospital Course:   The patient was admitted at [facility] from 4/9 to 4/13/2018.  4/13: Patient admitted to SNF for ongoing PT/OT following a hospitalization for PVD s/p bypass surgery.  4/16: Patient seen at bedside, reports khan discomfort is much improved today. Patient does report intermittent pain that is controlled with pain education. Labs reviewed.   4/19: Patient hypotensive, encourage oral fluids, decreased BPs meds. BP improved with oral fluids  4/20:  today stopped norvasc, will continue to monitor, encouraged oral fluids. . Patient c/o intermittent burning while urinating. UA negative, pending  cultures   4/23: Patient with SBP low 100s discussed  with Dr. Miranda instructed to hold BP meds encourage oral fluids and f/u with PCP on when to resume. Patient discharge home with home health services.      Significant Diagnostic Studies:    Recent Labs  Lab 04/19/18 0452 04/23/18  0445   WBC 5.37 4.59   HGB 8.1* 8.6*   HCT 25.2* 26.7*    352*         Recent Labs  Lab 04/19/18 0452 04/20/18  0520 04/23/18  0445   * 131* 134*   K 4.8 4.7 4.7    98 101   CO2 20* 27 24   BUN 23 20 29*   CREATININE 1.3 1.1 1.3   CALCIUM 9.0 9.4 9.2     Lab Results   Component Value Date    LABPROT 10.7 04/09/2018    ALBUMIN 2.9 (L) 04/11/2018     Lab Results   Component Value Date    CALCIUM 9.2 04/23/2018    PHOS 4.7 (H) 04/23/2018       Final Active Diagnoses:    Diagnosis Date Noted POA    PRINCIPAL PROBLEM:  PVD (peripheral vascular disease) with claudication [I73.9] 04/04/2018 Yes    Malnutrition [E46] 04/16/2018 Yes    AVE (acute kidney injury) [N17.9] 04/16/2018 Unknown    S/P femoral-tibial bypass [Z98.890] 04/14/2018 Not Applicable    Tachycardia [R00.0] 04/14/2018 Yes    Acute urinary obstruction [N13.9] 04/14/2018 Yes    Acute blood loss anemia [D62] 04/14/2018 Yes    Infection associated with indwelling urinary catheter [T83.511A] 04/14/2018 Yes    Ex-heavy cigarette smoker (20-39 per day) [Z87.891] 04/14/2018 Not Applicable    Essential hypertension [I10] 03/20/2018 Yes      Problems Resolved During this Admission:    Diagnosis Date Noted Date Resolved POA      * PVD (peripheral vascular disease) with claudication    · S/p left SFA to posterior tibial bypass  · Continue medical therapy with rosuvastatin, plavix  · See plan below.        AVE (acute kidney injury)    · AVE after receiving bactrim  · Bactrim has since been stopped  · Cr on 4/15 was 1.6 and was treated with 1L of NS  · Today Cr 1.3--improved        Malnutrition    · dietitian consulted  · Continue cardiac diet  · Discussed with  TATUM Medina to register patient for [employer] program        Ex-heavy cigarette smoker (20-39 per day)    · With dyspnea while admitted, neg CXR and started on titropium  · Patient oxygenating well and report intermittent dyspnea on exertion   · F/U with PCP for w/u for underlying obstructive disease  · Continue titropium inhalation.  · Monitor respiratory status.        Infection associated with indwelling urinary catheter    · Urine appears infected by U/A  · Start bactrim therapy but was discontinued for AVE and switched to cipro, cipro was discontinued and started on diflucan on yeast  · Patient will need khan change if culture + but will also do a voiding trial at that time to potentially prevent re-insertion.  · Repeat UA and culture negative for infection (collected after removal of khan)        Acute blood loss anemia    · Patient started on iron therapy when hospitalized which will be continued  · Monitor H/H with twice weekly labs        Acute urinary obstruction    · Khan d/c by urology and patient was able to void without difficulty after removal   · Continue therapy with tamsulosin which patient takes chronically        Tachycardia    · Sinus tach on ECG  · Likely due to pain from khan; treating UTI--khan removed and improved  · Monitor H/H and transfuse as indicated        S/P femoral-tibial bypass    · Improving  · Patient with minimal pain to left leg  · Continue oxycodone prn   · Doppler checks every shift  · continue PT/OT to increase ambulation, ADL performance and endurance  · continue heparin for DVT prophylaxis  · continue fall precautions  · continue senokot-s to prevent constipation; hold for frequent or loose stooling  · F/u with Dr. Mahmood        Essential hypertension    · Chronic with SBP lows  · Currently holding both amlodipine and valsartan/hctz  · Encourage oral fluids  · F/u with PCP to determine restarting medications         Pt note, GISSELLE Dumont, PTA 4/21/18  General  Precautions: Standard  Functional Status:  MDS G  Bed Mobility Functional Status: mod(I) - (I)  Transfer Functional Status: S-SBA  Walk in Corridor Functional Status: S-SBA  Bed Mobility:  Sit>Supine:mod I  Supine>Sit: mod I  Transfers:  Sit<>Stand: mod I with RW  Stand Pivot Transfer: mod I no AD EOB<>WC   Gait:  Amb with RW S ~ 270 ft and 200 ft with seated rest break vcs to take his time negotiating turn for inc safety  Advanced Gait:  Stairs: asd/dec 4 steps x 3 trials BHR S  Therex:  2x15 reps AP, GS,QS,LAQ,hip flex,abd/add  Discharge recommendations: home health PT       Ot GISSELLE perry, OTR/L 4/22/18  General Precautions: Standard, fall  Orthopedic Precautions: N/A  Braces: N/A  Functional Status:  MDS G  Bed Mobility Functional Status:  (No assist)     Transfer Functional Status: S-SBA (for safety when standing and perforoming SPTs)     Walk in Room Functional Status: S-SBA (ambulating with RW 20ft from EOB to stand sinkside. )     Dressing Functional Status: S-SBA (pt needing (S) when performing standing portion of LBD.)     Eating Functional Status: Mod(I)  (no assist)     Toilet Use Functional Status: S-SBA (when standing. Pt usinf RW and BSC over toilet for safety.)     Personal Hygiene Functional Status: S-SBA (standing sinkside to perform grooming.)        Moving from seated to standing position: Not steady, but able to stabilize without staff assistance  Walking (with assistive device if used): Not steady, but able to stabilize without staff assistance (RW to steady when standing.)  Turning around and facing the opposite direction while walking: Not steady, but able to stabilize without staff assistance  Moving on and off the toilet: Not steady, but able to stabilize without staff assistance  Surface-to-surface transfer (transfer between bed and chair or wheelchair): Not steady, but able to stabilize without staff assistance  Discharge recommendations: home with home health       Discharged  "Condition: stable    Disposition: Home-Health Care [facility]    Follow Up:  Follow-up Information     BARTOLOME Mahmood Iii, MD. Go on 5/1/2018.    Specialty:  Vascular Surgery  Contact information:  [address]  [facility] [address]  [phone]             Jessica Claire PA-C. Go on 5/28/2018.    Specialty:  Urology  Contact information:  [address]  [facility] [address]  [phone]             Schedule an appointment as soon as possible for a visit with [facility].    Contact information:  [address]  [facility] [address]  [phone]             [facility].    Specialties:  Home Health Services, Physical Therapy, Occupational Therapy  Why:  Agency will call pt to schedule a home health assessment.  Contact information:  [address]  [address]  [facility] [address]  [phone]                 Future Appointments  Date Time Provider Department Center   5/1/2018 1:00 PM VASCULAR, LAB [facility]   5/1/2018 2:00 PM VASCULAR, LAB [facility]   5/1/2018 2:45 PM BARTOLOME Mahmood III, MD [facility] VASCSUR [facility]   5/1/2018 3:20 PM RESE[facility]IOLOGY[facility]   5/28/2018 2:40 PM Jessica Claire PA-C [facility] UROLOGY [facility]       Patient Instructions:     WALKER FOR HOME USE   Order Specific Question Answer Comments   Type of Walker: Adult (5'4"-6'6")    With wheels? Yes    Height: 5' 10" (1.778 m)    Weight: 48.2kg    Length of need (1-99 months): 12    Does patient have medical equipment at home? none    Please check all that apply: Patient's condition impairs ambulation.    Please check all that apply: Patient is unable to safely ambulate without equipment.    Please check all that apply: Walker will be used for gait training.    Vendor: Other (use comments) Pt to SNF   Expected Date of Delivery: 4/23/2018      Activity as tolerated     No driving until:     Notify your health care provider if you experience any of " the following:  temperature >100.4     Notify your health care provider if you experience any of the following:  persistent nausea and vomiting or diarrhea     Notify your health care provider if you experience any of the following:  severe uncontrolled pain     Notify your health care provider if you experience any of the following:  redness, tenderness, or signs of infection (pain, swelling, redness, odor or green/yellow discharge around incision site)     Notify your health care provider if you experience any of the following:  difficulty breathing or increased cough     Notify your health care provider if you experience any of the following:  severe persistent headache     Notify your health care provider if you experience any of the following:  persistent dizziness, light-headedness, or visual disturbances     Notify your health care provider if you experience any of the following:  increased confusion or weakness       Medications:  Reconciled Home Medications:      Medication List      START taking these medications    magnesium oxide 400 mg tablet  Commonly known as:  MAG-OX  Take 1 tablet (400 mg total) by mouth 2 (two) times daily.     oxyCODONE 5 MG immediate release tablet  Commonly known as:  ROXICODONE  Take 1 tablet (5 mg total) by mouth every 4 (four) hours as needed for Pain.     tiotropium 18 mcg inhalation capsule  Commonly known as:  SPIRIVA  Inhale 1 capsule (18 mcg total) into the lungs once daily. Controller  Start taking on:  4/24/2018        CHANGE how you take these medications    amLODIPine 5 MG tablet  Commonly known as:  NORVASC  Take 1 tablet (5 mg total) by mouth once daily. DO NOT take until resumed by PCP  What changed:  additional instructions     valsartan-hydrochlorothiazide 160-25 mg per tablet  Commonly known as:  DIOVAN-HCT  Take 1 tablet by mouth once daily. DO NOT take until resumed by PCP  What changed:  additional instructions        CONTINUE taking these medications     clopidogrel 75 mg tablet  Commonly known as:  PLAVIX  Take 75 mg by mouth once daily.     dorzolamide-timolol 2-0.5% 22.3-6.8 mg/mL ophthalmic solution  Commonly known as:  COSOPT  Place 1 drop into the right eye 2 (two) times daily.     ferrous sulfate 324 mg (65 mg iron) Tbec  Take 325 mg by mouth once daily.     FLOMAX 0.4 mg Cp24  Generic drug:  tamsulosin  Take 0.4 mg by mouth once daily.     latanoprost 0.005 % ophthalmic solution  Place 1 drop into the right eye every evening.     rosuvastatin 20 MG tablet  Commonly known as:  CRESTOR  Take 20 mg by mouth once daily.     senna-docusate 8.6-50 mg 8.6-50 mg per tablet  Commonly known as:  PERICOLACE  Take 1 tablet by mouth 2 (two) times daily.        STOP taking these medications    hydrocodone-acetaminophen 5-325mg 5-325 mg per tablet  Commonly known as:  NORCO          Time spent on the discharge of patient: 25 minutes    Tosin Bustillo NP  Department of Hospital Medicine  [facility]

## 2018-04-23 NOTE — ASSESSMENT & PLAN NOTE
· Sinus tach on ECG  · Likely due to pain from khan; treating UTI--khan removed and improved  · Monitor H/H and transfuse as indicated

## 2018-04-24 ENCOUNTER — PATIENT OUTREACH (OUTPATIENT)
Dept: ADMINISTRATIVE | Facility: CLINIC | Age: 73
End: 2018-04-24

## 2018-04-24 RX ORDER — OXYCODONE AND ACETAMINOPHEN 5; 325 MG/1; MG/1
1 TABLET ORAL EVERY 4 HOURS PRN
Qty: 35 TABLET | Refills: 0 | Status: SHIPPED | OUTPATIENT
Start: 2018-04-24 | End: 2019-01-29

## 2018-05-08 ENCOUNTER — HOSPITAL ENCOUNTER (OUTPATIENT)
Dept: VASCULAR SURGERY | Facility: CLINIC | Age: 73
Discharge: HOME | End: 2018-05-08
Attending: STUDENT IN AN ORGANIZED HEALTH CARE EDUCATION/TRAINING PROGRAM
Payer: MEDICARE

## 2018-05-08 ENCOUNTER — RESEARCH ENCOUNTER (OUTPATIENT)
Dept: RESEARCH | Facility: HOSPITAL | Age: 73
End: 2018-05-08

## 2018-05-08 ENCOUNTER — OFFICE VISIT (OUTPATIENT)
Dept: VASCULAR SURGERY | Facility: CLINIC | Age: 73
End: 2018-05-08
Payer: MEDICARE

## 2018-05-08 VITALS
TEMPERATURE: 98 F | HEIGHT: 70 IN | SYSTOLIC BLOOD PRESSURE: 155 MMHG | HEART RATE: 67 BPM | BODY MASS INDEX: 15.17 KG/M2 | DIASTOLIC BLOOD PRESSURE: 90 MMHG | WEIGHT: 106 LBS

## 2018-05-08 DIAGNOSIS — Z98.890 S/P FEMORAL-TIBIAL BYPASS: Primary | ICD-10-CM

## 2018-05-08 DIAGNOSIS — I70.229 ATHEROSCLEROTIC PERIPHERAL VASCULAR DISEASE WITH REST PAIN: ICD-10-CM

## 2018-05-08 DIAGNOSIS — I10 ESSENTIAL HYPERTENSION, BENIGN: Primary | ICD-10-CM

## 2018-05-08 DIAGNOSIS — I73.9 PERIPHERAL ARTERIAL DISEASE: Primary | ICD-10-CM

## 2018-05-08 DIAGNOSIS — I10 ESSENTIAL HYPERTENSION, MALIGNANT: Primary | ICD-10-CM

## 2018-05-08 PROCEDURE — 93925 LOWER EXTREMITY STUDY: CPT | Mod: S$GLB,,, | Performed by: SURGERY

## 2018-05-08 PROCEDURE — 93923 UPR/LXTR ART STDY 3+ LVLS: CPT | Mod: S$GLB,,, | Performed by: SURGERY

## 2018-05-08 PROCEDURE — 99999 PR PBB SHADOW E&M-EST. PATIENT-LVL III: CPT | Mod: PBBFAC,,, | Performed by: SURGERY

## 2018-05-08 PROCEDURE — 99024 POSTOP FOLLOW-UP VISIT: CPT | Mod: S$GLB,,, | Performed by: SURGERY

## 2018-05-08 NOTE — PROGRESS NOTES
BEST   1 month follow-up    Subject returns to the vascular clinic to see Dr. Mahmood for 1 month BEST follow-up. The patient had a lower extremity US with KATHARINA's. Physical exam performed and medications were reviewed.  He is having some swelling in his left foot since the procedure. He will have labs drawn before he goes home today. I did clarify he has a smoking history of 30+ years.    The following BEST questionnaires were completed:   VascuQol   EQ-5D  Pain scale  Health and well being questionnaire    Subject will return for 3 month follow-up.

## 2018-05-08 NOTE — PROGRESS NOTES
See my prior inpatient notes; review of systems, family history and social   history are unchanged.    HISTORY OF PRESENT ILLNESS:  A 73-year-old old male, status post:    1.  Left distal SFA to posterior tibial artery bypass with nonreversed saphenous   vein, 04/09/2018.    This patient is enrolled in the BEST randomized prospective trial of surgical   versus endovascular therapy for chronic limb ischemia.  This patient had classic   ischemic rest pain prior to this intervention.  The rest pain is gone.  His   very short distance claudication has also improved substantially.    MEDICATIONS:  Include Plavix and statin.    PHYSICAL EXAMINATION:  VITAL SIGNS:  See nursing note.  EXTREMITIES:  Left leg demonstrates well healed incisions in the medial and   calf.  There is moderate edema in the lower calf and ankle.  His pedal pulses   are not palpable, which may be secondary to the edema.  The foot appears pink   and well perfused without any lesions.    IMAGING:  ABIs are 0.84 on the right and 1.11 on the left, compared with 0.60 on   the left pre-intervention.  Notably, his PVR waveforms preoperatively were   completely flat at the ankle and now have excellent pulsatility.  Duplex of the   bypass showed it to be patent with no stenosis.  A moderate mid posterior tibial   artery stenosis of 251 is noted, which is distal to the anastomosis.    ASSESSMENT:  1.  One month status post distal SFA to posterior tibial artery bypass. BEST trial patient.  The   bypass is patent without stenosis and ischemic rest pain symptoms have   completely resolved.  2.  Mild to moderate left leg edema.  I have reassured him this is very common   after the surgery and in most cases will resolve within two to three months.    RECOMMENDATION:  1.  Leg elevation when he is not walking.  2.  Continue current medical therapy.  3.  Follow up with me in three months with duplex of his left leg bypass and   ABIs, sooner if clinically indicated or  demanded per the best protocol.      KEZIA/KANCHAN  dd: 05/08/2018 14:57:15 (CDT)  td: 05/08/2018 16:04:25 (CDT)  Doc ID   #[id number]  Job ID #[id number]    CC:

## 2018-06-20 ENCOUNTER — TELEPHONE (OUTPATIENT)
Dept: VASCULAR SURGERY | Facility: CLINIC | Age: 73
End: 2018-06-20

## 2018-06-20 NOTE — TELEPHONE ENCOUNTER
Contacted patient to reschedule appts with vascular lab and with Dr. Mahmood in accordance with guidelines of the BEST study. Patient confirms appt times. Appt letter placed in mail.

## 2018-07-31 ENCOUNTER — TELEPHONE (OUTPATIENT)
Dept: VASCULAR SURGERY | Facility: CLINIC | Age: 73
End: 2018-07-31

## 2018-07-31 NOTE — TELEPHONE ENCOUNTER
Attempted to contact patient and sister due to patient not arriving for scheduled clinic visit with Dr. Mahmood. Voice message left for patient and sister on sister's contact number. Will reattempt.

## 2018-08-27 ENCOUNTER — TELEPHONE (OUTPATIENT)
Dept: VASCULAR SURGERY | Facility: CLINIC | Age: 73
End: 2018-08-27

## 2018-08-27 NOTE — TELEPHONE ENCOUNTER
Attempted to contact patient and patient's sister with appt reminder for clinic visit and appts with vascular lab on Tuesday, August 28, 2018. Unable to contact. Voice message left for patient's sister. Will reattempt.

## 2018-08-28 ENCOUNTER — HOSPITAL ENCOUNTER (OUTPATIENT)
Dept: VASCULAR SURGERY | Facility: CLINIC | Age: 73
Discharge: HOME | End: 2018-08-28
Attending: SURGERY
Payer: MEDICARE

## 2018-08-28 ENCOUNTER — OFFICE VISIT (OUTPATIENT)
Dept: VASCULAR SURGERY | Facility: CLINIC | Age: 73
End: 2018-08-28
Payer: MEDICARE

## 2018-08-28 VITALS
TEMPERATURE: 97 F | HEART RATE: 53 BPM | SYSTOLIC BLOOD PRESSURE: 122 MMHG | WEIGHT: 114.63 LBS | BODY MASS INDEX: 16.41 KG/M2 | HEIGHT: 70 IN | DIASTOLIC BLOOD PRESSURE: 75 MMHG

## 2018-08-28 DIAGNOSIS — I73.9 PERIPHERAL ARTERIAL DISEASE: ICD-10-CM

## 2018-08-28 DIAGNOSIS — I70.229 ATHEROSCLEROTIC PERIPHERAL VASCULAR DISEASE WITH REST PAIN: Primary | ICD-10-CM

## 2018-08-28 PROCEDURE — 99999 PR PBB SHADOW E&M-EST. PATIENT-LVL III: CPT | Mod: PBBFAC,,, | Performed by: SURGERY

## 2018-08-28 PROCEDURE — 99024 POSTOP FOLLOW-UP VISIT: CPT | Mod: S$GLB,,, | Performed by: SURGERY

## 2018-08-28 PROCEDURE — 93926 LOWER EXTREMITY STUDY: CPT | Mod: S$GLB,,, | Performed by: SURGERY

## 2018-08-28 PROCEDURE — 93923 UPR/LXTR ART STDY 3+ LVLS: CPT | Mod: S$GLB,,, | Performed by: SURGERY

## 2018-08-28 NOTE — PROGRESS NOTES
See my prior inpatient notes; review of systems, family history and social   history are unchanged.    HISTORY OF PRESENT ILLNESS:  A 74-year-old old male, status post:    1.  Left distal SFA to posterior tibial artery bypass with nonreversed saphenous   vein, 04/09/2018.    This patient is enrolled in the BEST randomized prospective trial of surgical   versus endovascular therapy for chronic limb ischemia.  This patient had classic   ischemic rest pain prior to this intervention.  The rest pain is gone.  His   very short distance claudication has also improved substantially.    He left today before being examined    MEDICATIONS:  Include Plavix and statin.    PHYSICAL EXAMINATION: none today    IMAGING:      KATHARINA today 1.06 R, 1.23 L    Duplex shows a patent bypass without stenosis    ABIs are 0.84 on the right and 1.11 on the left, compared with 0.60 on   the left pre-intervention.  Notably, his PVR waveforms preoperatively were   completely flat at the ankle and now have excellent pulsatility.  Duplex of the   bypass showed it to be patent with no stenosis.  A moderate mid posterior tibial   artery stenosis of 251 is noted, which is distal to the anastomosis.    ASSESSMENT:  1.  4 month status post distal SFA to posterior tibial artery bypass. BEST trial patient.  The   bypass is patent without stenosis    RECOMMENDATION:  3.  Follow up with me in three months with duplex of his left leg bypass and   ABIs, sooner if clinically indicated or demanded per the best protocol.

## 2018-09-07 ENCOUNTER — TELEPHONE (OUTPATIENT)
Dept: CARDIOLOGY | Facility: CLINIC | Age: 73
End: 2018-09-07

## 2018-09-07 NOTE — TELEPHONE ENCOUNTER
BEST study    Called patient to confirm if he is currently taking aspirin. No answer and not able to leave a message.

## 2018-09-19 ENCOUNTER — TELEPHONE (OUTPATIENT)
Dept: CARDIOLOGY | Facility: CLINIC | Age: 73
End: 2018-09-19

## 2018-09-19 NOTE — TELEPHONE ENCOUNTER
BEST study  6 month    Called subject to schedule 6 month visit. No answer and not able to leave a message. Will call secondary contact to reach subject.

## 2018-11-27 ENCOUNTER — TELEPHONE (OUTPATIENT)
Dept: VASCULAR SURGERY | Facility: CLINIC | Age: 73
End: 2018-11-27

## 2018-11-27 NOTE — TELEPHONE ENCOUNTER
Contacted patient in regard to not arriving for scheduled appts in vascular lab and with Dr. Mahmood. Pt states he does not have transportation to appts. Offered to reschedule appts to later in afternoon if this is more convenient for patient. States he will not have transportation to appts at all today due to not having a wife or significant other to bring him and his children not being local to bring him. Notified patient nurse would like to reschedule appts so that arrangements can be made with medicare transportation. Pt states he does not even know why he needs to see doctor. Notified patient that it is important for him to follow up with doctor in regards to his surgeries and to follow-up with research because he is participating in study with research. Appts rescheduled, pt verified. Asked pt to contact his insurance company to arrange for transportation to doctor's appts. Pt verbalizes understanding. Appt letter placed in mail.

## 2019-01-15 NOTE — TREATMENT PLAN
"Rehab Services' DME recommendations    Ayaan PORTILLO Ricks  MRN: [id number]          [x] Walker Adult (5'4"-6"6")    Accessories N/A    Wheels Yes  [x] Home health PT and OT    VENECIA Reid 4/20/2018      "
"Rehab Services' DME recommendations    Ayaan Ricks  MRN: [id number]      [x] Walker Adult (5'4"-6"6")      Wheels Yes   [x] 3 in 1 commode Standard    [x] Tub bench Standard (unpadded)  [x] Home health PT, OT and Aide    CORTES Lr 4/23/2018        "
Declined

## 2019-01-29 ENCOUNTER — HOSPITAL ENCOUNTER (EMERGENCY)
Facility: OTHER | Age: 74
Discharge: HOME | End: 2019-01-29
Attending: EMERGENCY MEDICINE
Payer: MEDICARE

## 2019-01-29 VITALS
OXYGEN SATURATION: 99 % | WEIGHT: 117 LBS | HEART RATE: 70 BPM | TEMPERATURE: 97 F | BODY MASS INDEX: 16.75 KG/M2 | RESPIRATION RATE: 18 BRPM | HEIGHT: 70 IN | DIASTOLIC BLOOD PRESSURE: 70 MMHG | SYSTOLIC BLOOD PRESSURE: 130 MMHG

## 2019-01-29 DIAGNOSIS — M79.3 LIPODERMATOSCLEROSIS: Primary | ICD-10-CM

## 2019-01-29 PROCEDURE — 99282 EMERGENCY DEPT VISIT SF MDM: CPT

## 2019-01-29 RX ORDER — LOSARTAN POTASSIUM AND HYDROCHLOROTHIAZIDE 25; 100 MG/1; MG/1
1 TABLET ORAL DAILY
COMMUNITY
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
## 2019-01-29 NOTE — ED NOTES
Patient Identifiers for Ayaan Ricks checked and correct  LOC: The patient is awake, alert and aware of environment with an appropriate affect, the patient is oriented x 3 and speaking appropriate.  APPEARANCE: Elderly. Patient resting comfortably and in no acute distress. The patient is clean and well groomed. The patient's clothing is properly fastened.  SKIN: The skin is warm and dry. The patient has delayed skin turgor and moist mucus membranes.Pt has dry scaly rash on the bilateral lower extremities, LLE rash more wide spread than the RLE. No drainage, vesicles or pustules observed.  Skin Intact , no breakdown noted.  Musculoskeletal :  Normal range of motion noted. Moves all extremities well.  RESPIRATORY: Airway is open and patent, respirations are spontaneous, patient has a normal effort and rate.   PULSES: 2+ radial & pedal pulses, symmetrical in all extremities.       Will continue to monitor

## 2019-01-29 NOTE — DISCHARGE INSTRUCTIONS
- apply vaseline/aquaphor to anterior legs  - follow up with Dr. Mahmood.  - skin changes are a result of your chronic arterial disease.

## 2019-02-13 ENCOUNTER — TELEPHONE (OUTPATIENT)
Dept: VASCULAR SURGERY | Facility: CLINIC | Age: 74
End: 2019-02-13

## 2019-02-13 NOTE — TELEPHONE ENCOUNTER
Contacted patient to schedule appts for studies and with Dr. Mahmood. Pt states he does not have transportation to appts and he can't drive due to poor vision. Notified patient that his insurance company may provide transportation to appts and that he will just need to contact them several days before appt to notify them of need. Also underscored importance of reporting to regular FU with Dr. Mahmood to evaluate leg bypass for proper function and patency. Pt verbalized understanding. Appt scheduled, pt verified. Appt letter placed in mail. States he will contact medical transportation and notify them of appts. Will contact patient day before appt for reminder.  ----- Message from Fay Chong sent at 2/11/2019 12:54 PM CST -----  Regarding: BEST CLI follow-up  ParrisMr. Ricks is due for 1 year BEST follow-up in March 2019. Please schedule the following tests  1. Labs- Creatine   2.. Bilateral KATHARINA's & duplex arterial US  3. Appt. With Ela    Thank you,  Fay   [id number]

## 2019-03-19 ENCOUNTER — HOSPITAL ENCOUNTER (OUTPATIENT)
Dept: VASCULAR SURGERY | Facility: CLINIC | Age: 74
Discharge: HOME | End: 2019-03-19
Attending: STUDENT IN AN ORGANIZED HEALTH CARE EDUCATION/TRAINING PROGRAM
Payer: MEDICARE

## 2019-03-19 ENCOUNTER — RESEARCH ENCOUNTER (OUTPATIENT)
Dept: RESEARCH | Facility: HOSPITAL | Age: 74
End: 2019-03-19

## 2019-03-19 ENCOUNTER — OFFICE VISIT (OUTPATIENT)
Dept: VASCULAR SURGERY | Facility: CLINIC | Age: 74
End: 2019-03-19
Payer: MEDICARE

## 2019-03-19 VITALS
HEART RATE: 69 BPM | TEMPERATURE: 97 F | SYSTOLIC BLOOD PRESSURE: 138 MMHG | WEIGHT: 112.44 LBS | DIASTOLIC BLOOD PRESSURE: 81 MMHG | HEIGHT: 70 IN | BODY MASS INDEX: 16.1 KG/M2

## 2019-03-19 DIAGNOSIS — I73.9 PAD (PERIPHERAL ARTERY DISEASE): ICD-10-CM

## 2019-03-19 DIAGNOSIS — I70.229 ATHEROSCLEROTIC PERIPHERAL VASCULAR DISEASE WITH REST PAIN: ICD-10-CM

## 2019-03-19 DIAGNOSIS — I73.9 PVD (PERIPHERAL VASCULAR DISEASE): Primary | ICD-10-CM

## 2019-03-19 DIAGNOSIS — Z98.890 S/P FEMORAL-TIBIAL BYPASS: Primary | ICD-10-CM

## 2019-03-19 PROCEDURE — 93926 PR DUPLEX LO EXTREM ART UNILAT/LTD: ICD-10-PCS | Mod: S$GLB,,, | Performed by: SURGERY

## 2019-03-19 PROCEDURE — 93926 LOWER EXTREMITY STUDY: CPT | Mod: S$GLB,,, | Performed by: SURGERY

## 2019-03-19 PROCEDURE — 3079F DIAST BP 80-89 MM HG: CPT | Mod: CPTII,S$GLB,, | Performed by: SURGERY

## 2019-03-19 PROCEDURE — 93924 PR NON-INVASIVE LOWER EXTREM ART STRESS/REST, COMPLETE,BILATERAL: ICD-10-PCS | Mod: S$GLB,,, | Performed by: SURGERY

## 2019-03-19 PROCEDURE — 1101F PR PT FALLS ASSESS DOC 0-1 FALLS W/OUT INJ PAST YR: ICD-10-PCS | Mod: CPTII,S$GLB,, | Performed by: SURGERY

## 2019-03-19 PROCEDURE — 1101F PT FALLS ASSESS-DOCD LE1/YR: CPT | Mod: CPTII,S$GLB,, | Performed by: SURGERY

## 2019-03-19 PROCEDURE — 99999 PR PBB SHADOW E&M-EST. PATIENT-LVL III: ICD-10-PCS | Mod: PBBFAC,,, | Performed by: SURGERY

## 2019-03-19 PROCEDURE — 99999 PR PBB SHADOW E&M-EST. PATIENT-LVL III: CPT | Mod: PBBFAC,,, | Performed by: SURGERY

## 2019-03-19 PROCEDURE — 3075F PR MOST RECENT SYSTOLIC BLOOD PRESS GE 130-139MM HG: ICD-10-PCS | Mod: CPTII,S$GLB,, | Performed by: SURGERY

## 2019-03-19 PROCEDURE — 99214 PR OFFICE/OUTPT VISIT, EST, LEVL IV, 30-39 MIN: ICD-10-PCS | Mod: S$GLB,,, | Performed by: SURGERY

## 2019-03-19 PROCEDURE — 93924 LWR XTR VASC STDY BILAT: CPT | Mod: S$GLB,,, | Performed by: SURGERY

## 2019-03-19 PROCEDURE — 99214 OFFICE O/P EST MOD 30 MIN: CPT | Mod: S$GLB,,, | Performed by: SURGERY

## 2019-03-19 PROCEDURE — 3075F SYST BP GE 130 - 139MM HG: CPT | Mod: CPTII,S$GLB,, | Performed by: SURGERY

## 2019-03-19 PROCEDURE — 3079F PR MOST RECENT DIASTOLIC BLOOD PRESSURE 80-89 MM HG: ICD-10-PCS | Mod: CPTII,S$GLB,, | Performed by: SURGERY

## 2019-03-19 NOTE — PROGRESS NOTES
BEST study  12 month follow-up    Subject returned to the clinic for 12 month BEST follow-up. He does not have any pain or discomfort in his legs.   At this visit we completed study questionaires.    EQ5D3L  Pain scale  EQ-5D  Health Scale= 40%  SF-12    The subject had labs drawn prior to his visit and had KATHARINA's and US of his legs performed.  Subject will return in 6 months for next visit.

## 2019-03-19 NOTE — PROGRESS NOTES
See my prior inpatient notes; review of systems, family history and social   history are unchanged.    HISTORY OF PRESENT ILLNESS:  A 75-year-old old male, status post:    1.  Left distal SFA to posterior tibial artery bypass with nonreversed saphenous   vein, 04/09/2018.    This patient is enrolled in the BEST randomized prospective trial of surgical   versus endovascular therapy for chronic limb ischemia.  This patient had classic   ischemic rest pain prior to this intervention.  The rest pain is gone.  His   very short distance claudication has also improved substantially.    This is a 6 month visit.  No c/o.  No rest pain    MEDICATIONS:  Include Plavix and statin.    PHYSICAL EXAMINATION:        L LE:  2+ femoral, 2+ L PT, foot warm    IMAGING:      KATHARINA today 0.83 (prior1.06 R, 1.26 (prior 1.23 L    Duplex shows a patent bypass without stenosis    ASSESSMENT:  1.  12 month status post distal SFA to posterior tibial artery bypass. BEST trial patient.  The   bypass is patent without stenosis  2. Cont plavix and statin    RECOMMENDATION:  3.  Follow up with me in 6 months with duplex of his left leg bypass and   ABIs, sooner if clinically indicated or demanded per the best protocol.    DENITA Mahmood III, MD, FACS  Professor and Chief, Vascular and Endovascular Surgery

## 2019-10-01 ENCOUNTER — TELEPHONE (OUTPATIENT)
Dept: CARDIOLOGY | Facility: CLINIC | Age: 74
End: 2019-10-01

## 2019-10-01 NOTE — TELEPHONE ENCOUNTER
BEST study    Called patient to arrange a BEST 18 month follow-up. Mr. Ricks explained he does not have a ride to get to the hospital. I will see what I can arrange and call him back.

## 2019-11-21 ENCOUNTER — RESEARCH ENCOUNTER (OUTPATIENT)
Dept: RESEARCH | Facility: HOSPITAL | Age: 74
End: 2019-11-21

## 2019-12-03 NOTE — PROGRESS NOTES
Study Closure and 24 month BEST CLI study visit (telephone)  [id number]    I called and spoke to Mr. Ricks for his last and final visit for the BEST study. I explained to him the study had decided to close [facility] and he no longer needs to return to the clinic for follow-up. He explained to me he does not have transportation and has vision issues so not able to see well. He stated he no longer wanted to participate in the BEST trial and would not sign the consent forms I sent him for extended follow-up on the phone. He stated he is old and doesn't want to spend any more time on the phone answering my questions. He stated he has not had any new procedures on his leg and he is feeling fine with very little pain in his legs. I thanked him for his participation and wished him all the best. I instructed him to call me if he has any research related questions. He verbalized understanding. I will send him a final [facility] CLI letter explaining the details of our conversation and the closing of the study at [facility].     I was able to complete the following forms with Mr. Ricks.  SF-12  EQ-5D

## 2021-06-18 DIAGNOSIS — I85.00 ESOPHAGEAL VARICES WITHOUT BLEEDING: Primary | ICD-10-CM

## 2022-10-12 ENCOUNTER — HOSPITAL ENCOUNTER (EMERGENCY)
Facility: OTHER | Age: 77
Discharge: HOME | End: 2022-10-12
Attending: EMERGENCY MEDICINE
Payer: MEDICARE

## 2022-10-12 VITALS
OXYGEN SATURATION: 97 % | SYSTOLIC BLOOD PRESSURE: 164 MMHG | HEART RATE: 64 BPM | RESPIRATION RATE: 18 BRPM | DIASTOLIC BLOOD PRESSURE: 88 MMHG | WEIGHT: 110 LBS | TEMPERATURE: 98 F | HEIGHT: 70 IN | BODY MASS INDEX: 15.75 KG/M2

## 2022-10-12 DIAGNOSIS — L02.811 ABSCESS, SCALP: Primary | ICD-10-CM

## 2022-10-12 PROCEDURE — 25000003 PHARM REV CODE 250: Performed by: EMERGENCY MEDICINE

## 2022-10-12 PROCEDURE — 99284 EMERGENCY DEPT VISIT MOD MDM: CPT | Mod: 25

## 2022-10-12 PROCEDURE — 10060 I&D ABSCESS SIMPLE/SINGLE: CPT

## 2022-10-12 RX ORDER — SULFAMETHOXAZOLE AND TRIMETHOPRIM 800; 160 MG/1; MG/1
1 TABLET ORAL 2 TIMES DAILY
Qty: 14 TABLET | Refills: 0 | Status: SHIPPED | OUTPATIENT
Start: 2022-10-12 | End: 2022-10-12 | Stop reason: SDUPTHER

## 2022-10-12 RX ORDER — SULFAMETHOXAZOLE AND TRIMETHOPRIM 800; 160 MG/1; MG/1
1 TABLET ORAL 2 TIMES DAILY
Qty: 14 TABLET | Refills: 0 | Status: SHIPPED | OUTPATIENT
Start: 2022-10-12 | End: 2022-10-19

## 2022-10-12 RX ORDER — IBUPROFEN 600 MG/1
600 TABLET ORAL EVERY 6 HOURS PRN
Qty: 30 TABLET | Refills: 0 | Status: SHIPPED | OUTPATIENT
Start: 2022-10-12

## 2022-10-12 RX ORDER — LIDOCAINE HYDROCHLORIDE 10 MG/ML
10 INJECTION INFILTRATION; PERINEURAL
Status: COMPLETED | OUTPATIENT
Start: 2022-10-12 | End: 2022-10-12

## 2022-10-12 RX ORDER — MUPIROCIN CALCIUM 20 MG/G
CREAM TOPICAL 3 TIMES DAILY
Qty: 15 G | Refills: 0 | Status: SHIPPED | OUTPATIENT
Start: 2022-10-12 | End: 2022-10-22

## 2022-10-12 RX ORDER — MUPIROCIN 20 MG/G
1 OINTMENT TOPICAL
Status: COMPLETED | OUTPATIENT
Start: 2022-10-12 | End: 2022-10-12

## 2022-10-12 RX ORDER — IBUPROFEN 400 MG/1
800 TABLET ORAL
Status: COMPLETED | OUTPATIENT
Start: 2022-10-12 | End: 2022-10-12

## 2022-10-12 RX ADMIN — MUPIROCIN 22 G: 20 OINTMENT TOPICAL at 11:10

## 2022-10-12 RX ADMIN — IBUPROFEN 800 MG: 400 TABLET, FILM COATED ORAL at 08:10

## 2022-10-12 RX ADMIN — LIDOCAINE HYDROCHLORIDE 10 ML: 10 INJECTION, SOLUTION INFILTRATION; PERINEURAL at 08:10

## 2022-10-12 NOTE — ED NOTES
Patient expressed being upset about waiting for the provider to do I&D procedure and wants to leave AMA. MD notified, gave prescriptions to patient. Patient reports he cannot afford the prescriptions and will wait till 12 PM.
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
## 2022-10-12 NOTE — ED TRIAGE NOTES
"78 y.o male presents to the ED with chief complaint of headache. Reports a "bump" to the back of his head x 1 week that is causing a headache. Denies fall and trauma. Denies any other complaints. AAOx4, NAD.  "

## 2022-11-17 NOTE — PROGRESS NOTES
[facility]  Vascular Surgery  Progress Note    Patient Name: Ayaan Ricks  MRN: [id number]  Admission Date: 4/9/2018  Primary Care Provider: Primary Doctor No    Subjective:     Interval History: Having issues with urinary retention, having multiple small void's of approx 50 mL and still feeling full. Bladder scan yesterday evening showed volume 497 mL so straight cath removed 600. Overnight continued incomplete voiding and post-void scan showed 500+ mL so a khan has been ordered, otherwise pain is well controlled, no nausea or vomiting, ambulating, afebrile, HDS    Post-Op Info:  Procedure(s) (LRB):  [id number] (Left)   2 Days Post-Op       Medications:  Continuous Infusions:  Scheduled Meds:   amLODIPine  5 mg Oral Daily    clopidogrel  75 mg Oral Daily    doxycycline  100 mg Oral BID    ferrous sulfate  325 mg Oral Daily    rosuvastatin  20 mg Oral Daily    senna-docusate 8.6-50 mg  1 tablet Oral BID    tamsulosin  0.8 mg Oral Daily    valsartan  160 mg Oral BID     PRN Meds:fentaNYL, hydrocodone-acetaminophen 7.5-325mg, labetalol, oxyCODONE, sodium chloride 0.9%, sodium chloride 0.9%     Objective:     Vital Signs (Most Recent):  Temp: 99.7 °F (37.6 °C) (04/11/18 0747)  Pulse: 86 (04/11/18 0906)  Resp: 20 (04/11/18 0747)  BP: (!) 147/85 (04/11/18 0906)  SpO2: 99 % (04/11/18 0747) Vital Signs (24h Range):  Temp:  [98.9 °F (37.2 °C)-100.5 °F (38.1 °C)] 99.7 °F (37.6 °C)  Pulse:  [80-95] 86  Resp:  [16-20] 20  SpO2:  [98 %-99 %] 99 %  BP: (132-168)/(65-86) 147/85       Date 04/11/18 0700 - 04/12/18 0659   Shift 5276-9165 0141-6987 8879-5065 24 Hour Total   I  N  T  A  K  E   Shift Total  (mL/kg)       O  U  T  P  U  T   Urine  (mL/kg/hr) 275   275    Shift Total  (mL/kg) 275  (5.1)   275  (5.1)   Weight (kg) 54.4 54.4 54.4 54.4       Physical Exam   Constitutional: He is oriented to person, place, and time. He appears well-developed and well-nourished. No distress.   HENT:    Head: Normocephalic and atraumatic.   Eyes: EOM are normal. Pupils are equal, round, and reactive to light.   Neck: Normal range of motion. Neck supple.   Cardiovascular: Normal rate and regular rhythm.    Palpable 2+ left PT  No hematomas appreciated at incisions, incisions CDI   Pulmonary/Chest: Effort normal. No respiratory distress.   Abdominal: Soft. He exhibits no distension.   Musculoskeletal: Normal range of motion.   Neurological: He is alert and oriented to person, place, and time.   Skin: Skin is warm and dry. He is not diaphoretic.   Psychiatric: He has a normal mood and affect. His behavior is normal. Judgment and thought content normal.   Nursing note and vitals reviewed.      Significant Labs:  CBC:   Recent Labs  Lab 04/11/18  0728   WBC 9.36   RBC 3.01*   HGB 9.0*   HCT 27.6*      MCV 92   MCH 29.9   MCHC 32.6     CMP:   Recent Labs  Lab 04/11/18  0728   GLU 97   CALCIUM 9.2   ALBUMIN 2.9*   PROT 6.8   *   K 4.1   CO2 24   CL 99   BUN 18   CREATININE 1.1   ALKPHOS 98   ALT 8*   AST 20   BILITOT 0.5       Significant Diagnostics:  I have reviewed all pertinent imaging results/findings within the past 24 hours.    Assessment/Plan:     PVD (peripheral vascular disease) with claudication    Patient is 72 yo male with PVD who underwent left SFA to PT bypass on 4/9/18.    - Urinary retention - will place khan, flomax 0.8 mg daily  - cardiac diet  - PO pain meds  - PT/OT  - ASA, plavix  - Home meds            Nolan Bose MD  Vascular Surgery  [facility]   89-year-old female with history of dementia, CHF, hypertension pacemaker on baby aspirin here for evaluation of dark-colored stools.  Patient was found on the floor by her home with a with dark-colored stools.  Patient poor story not providing other further information agree with above exam  impression  Patient here on the floor with maroon-colored stools.  Patient not hypotensive digit rectal exam demonstrates colored stools and labs demonstrate hemoglobin 8.0 and a white count of 29,000.  Previous hemoglobin was 12.  Patient was seen by GI and transfused unit of blood.  CT angio shows no acute active sterilization in the abdomen.  Patient to be admitted to stepdown unit with plan for colonoscopy as an inpatient.  Of note patient received the transfusion but after were unable to get in touch with family and 2 providers signed the consent on behalf of the patient to the urgent need of the transfusion.

## 2023-12-14 ENCOUNTER — HOSPITAL ENCOUNTER (EMERGENCY)
Facility: HOSPITAL | Age: 78
Discharge: HOME | End: 2023-12-15
Attending: EMERGENCY MEDICINE
Payer: MEDICARE

## 2023-12-14 DIAGNOSIS — R53.1 GENERALIZED WEAKNESS: Primary | ICD-10-CM

## 2023-12-14 DIAGNOSIS — C61 PROSTATE CANCER METASTATIC TO BONE: ICD-10-CM

## 2023-12-14 DIAGNOSIS — C79.51 PROSTATE CANCER METASTATIC TO BONE: ICD-10-CM

## 2023-12-14 DIAGNOSIS — R06.02 SOB (SHORTNESS OF BREATH): ICD-10-CM

## 2023-12-14 LAB
ALBUMIN SERPL BCP-MCNC: 3.4 G/DL (ref 3.5–5.2)
ALP SERPL-CCNC: 141 U/L (ref 55–135)
ALT SERPL W/O P-5'-P-CCNC: 10 U/L (ref 10–44)
ANION GAP SERPL CALC-SCNC: 11 MMOL/L (ref 8–16)
AST SERPL-CCNC: 17 U/L (ref 10–40)
BASOPHILS # BLD AUTO: 0.01 K/UL (ref 0–0.2)
BASOPHILS NFR BLD: 0.2 % (ref 0–1.9)
BILIRUB SERPL-MCNC: 0.3 MG/DL (ref 0.1–1)
BILIRUB UR QL STRIP: NEGATIVE
BNP SERPL-MCNC: 39 PG/ML (ref 0–99)
BUN SERPL-MCNC: 25 MG/DL (ref 8–23)
CALCIUM SERPL-MCNC: 8.6 MG/DL (ref 8.7–10.5)
CHLORIDE SERPL-SCNC: 103 MMOL/L (ref 95–110)
CLARITY UR REFRACT.AUTO: CLEAR
CO2 SERPL-SCNC: 20 MMOL/L (ref 23–29)
COLOR UR AUTO: YELLOW
CREAT SERPL-MCNC: 1.5 MG/DL (ref 0.5–1.4)
DIFFERENTIAL METHOD: ABNORMAL
EOSINOPHIL # BLD AUTO: 0 K/UL (ref 0–0.5)
EOSINOPHIL NFR BLD: 0.4 % (ref 0–8)
ERYTHROCYTE [DISTWIDTH] IN BLOOD BY AUTOMATED COUNT: 17 % (ref 11.5–14.5)
EST. GFR  (NO RACE VARIABLE): 47.4 ML/MIN/1.73 M^2
GLUCOSE SERPL-MCNC: 97 MG/DL (ref 70–110)
GLUCOSE UR QL STRIP: NEGATIVE
HCT VFR BLD AUTO: 31.5 % (ref 40–54)
HCV AB SERPL QL IA: NORMAL
HGB BLD-MCNC: 11 G/DL (ref 14–18)
HGB UR QL STRIP: NEGATIVE
HIV 1+2 AB+HIV1 P24 AG SERPL QL IA: NORMAL
IMM GRANULOCYTES # BLD AUTO: 0.01 K/UL (ref 0–0.04)
IMM GRANULOCYTES NFR BLD AUTO: 0.2 % (ref 0–0.5)
KETONES UR QL STRIP: NEGATIVE
LEUKOCYTE ESTERASE UR QL STRIP: NEGATIVE
LYMPHOCYTES # BLD AUTO: 1.4 K/UL (ref 1–4.8)
LYMPHOCYTES NFR BLD: 28.1 % (ref 18–48)
MAGNESIUM SERPL-MCNC: 1.9 MG/DL (ref 1.6–2.6)
MCH RBC QN AUTO: 29 PG (ref 27–31)
MCHC RBC AUTO-ENTMCNC: 34.9 G/DL (ref 32–36)
MCV RBC AUTO: 83 FL (ref 82–98)
MONOCYTES # BLD AUTO: 0.7 K/UL (ref 0.3–1)
MONOCYTES NFR BLD: 14.2 % (ref 4–15)
NEUTROPHILS # BLD AUTO: 2.9 K/UL (ref 1.8–7.7)
NEUTROPHILS NFR BLD: 56.9 % (ref 38–73)
NITRITE UR QL STRIP: NEGATIVE
NRBC BLD-RTO: 0 /100 WBC
PH UR STRIP: 6 [PH] (ref 5–8)
PHOSPHATE SERPL-MCNC: 3.7 MG/DL (ref 2.7–4.5)
PLATELET # BLD AUTO: 282 K/UL (ref 150–450)
PMV BLD AUTO: 9 FL (ref 9.2–12.9)
POTASSIUM SERPL-SCNC: 4 MMOL/L (ref 3.5–5.1)
PROT SERPL-MCNC: 8.4 G/DL (ref 6–8.4)
PROT UR QL STRIP: ABNORMAL
RBC # BLD AUTO: 3.79 M/UL (ref 4.6–6.2)
SODIUM SERPL-SCNC: 134 MMOL/L (ref 136–145)
SP GR UR STRIP: 1.02 (ref 1–1.03)
TROPONIN I SERPL DL<=0.01 NG/ML-MCNC: <0.006 NG/ML (ref 0–0.03)
URN SPEC COLLECT METH UR: ABNORMAL
WBC # BLD AUTO: 5.13 K/UL (ref 3.9–12.7)

## 2023-12-14 PROCEDURE — 99284 EMERGENCY DEPT VISIT MOD MDM: CPT | Mod: 25

## 2023-12-14 PROCEDURE — 63600175 PHARM REV CODE 636 W HCPCS: Performed by: STUDENT IN AN ORGANIZED HEALTH CARE EDUCATION/TRAINING PROGRAM

## 2023-12-14 PROCEDURE — 87389 HIV-1 AG W/HIV-1&-2 AB AG IA: CPT | Performed by: PHYSICIAN ASSISTANT

## 2023-12-14 PROCEDURE — 83735 ASSAY OF MAGNESIUM: CPT | Performed by: EMERGENCY MEDICINE

## 2023-12-14 PROCEDURE — 80053 COMPREHEN METABOLIC PANEL: CPT | Performed by: EMERGENCY MEDICINE

## 2023-12-14 PROCEDURE — 85025 COMPLETE CBC W/AUTO DIFF WBC: CPT | Performed by: EMERGENCY MEDICINE

## 2023-12-14 PROCEDURE — 96360 HYDRATION IV INFUSION INIT: CPT

## 2023-12-14 PROCEDURE — 81003 URINALYSIS AUTO W/O SCOPE: CPT | Performed by: EMERGENCY MEDICINE

## 2023-12-14 PROCEDURE — 84484 ASSAY OF TROPONIN QUANT: CPT | Performed by: EMERGENCY MEDICINE

## 2023-12-14 PROCEDURE — 84100 ASSAY OF PHOSPHORUS: CPT | Performed by: EMERGENCY MEDICINE

## 2023-12-14 PROCEDURE — 83880 ASSAY OF NATRIURETIC PEPTIDE: CPT | Performed by: EMERGENCY MEDICINE

## 2023-12-14 PROCEDURE — 86803 HEPATITIS C AB TEST: CPT | Performed by: PHYSICIAN ASSISTANT

## 2023-12-14 RX ADMIN — SODIUM CHLORIDE, POTASSIUM CHLORIDE, SODIUM LACTATE AND CALCIUM CHLORIDE 1000 ML: 600; 310; 30; 20 INJECTION, SOLUTION INTRAVENOUS at 09:12

## 2023-12-15 VITALS
HEART RATE: 84 BPM | TEMPERATURE: 98 F | SYSTOLIC BLOOD PRESSURE: 168 MMHG | WEIGHT: 110 LBS | OXYGEN SATURATION: 97 % | DIASTOLIC BLOOD PRESSURE: 67 MMHG | BODY MASS INDEX: 15.78 KG/M2 | RESPIRATION RATE: 20 BRPM

## 2023-12-15 NOTE — ED PROVIDER NOTES
ED Physician Hand-off Note:    ED Course: I assumed care of patient from off-going ED physician, Dr. Moon.  Briefly, Patient is presented for fatigue. 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. Bilateral leg lift intact on exam, sensation intact bilaterally, no urinary retention/incontinence, bowel symptoms, low concern for cord compression from mets to spine.    At the time of signout plan was pending UA, discharge. UA was negative, arranged for SW consult for outpatient assistance with physical therapy/medication management, patient has poor health literacy, was not aware that cancer was metastasized to spine and skull, has been in general decline over the last few months and lives alone. Walker ordered to patient's room before discharge.    Disposition: Discharge    Patient comfortable with discharge, follow up with cancer team at [facility]. Patient counseled regarding exam, results, diagnosis, treatment, and plan.    Impression: fatigue    Final diagnoses:  [R06.02] SOB (shortness of breath)  [R53.1] Generalized weakness (Primary)  [C61, C79.51] Prostate cancer metastatic to bone       Julia Garcia MD  12/15/23 0000

## 2023-12-15 NOTE — PLAN OF CARE
[facility] - Emergency Dept    HOME HEALTH ORDERS  FACE TO FACE ENCOUNTER    Patient Name: Ayaan PORTILLO Ricks  YOB: 1945    PCP: Alma Casillas -   PCP Address: [address] [place] [address]  PCP Phone Number: [phone]  PCP Fax: [phone]    Discharging Team(s): Data Unavailable    Encounter Date: 12/14/2023    Admit to Home Health    Diagnoses:  There are no hospital problems to display for this patient.      No future appointments.   Follow-up Information       [facility] - Emergency Dept.    Specialty: Emergency Medicine  Why: If symptoms worsen  Contact information:  [address]  [facility] [address] [phone]                             I have seen and examined this patient face to face today. My clinical findings that support the need for the home health skilled services and home bound status are the following:  Weakness/numbness causing balance and gait disturbance due to Weakness/Debility and Malignancy/Cancer making it taxing to leave home.  Requiring assistive device to leave home due to unsteady gait caused by  Weakness/Debility and Malignancy/Cancer.  Patient with medication mismanagement issues requiring home bound status as evidenced by  Poor understanding of medication regimen/dosage and Poor adherence to medication regimen/dosage.    Allergies:  Review of patient's allergies indicates:   Allergen Reactions    Shellfish containing products Nausea And Vomiting     PT REPORTS THAT HE HAS NAUSEA AND VOMITING AFTER EATING SHELLFISH. DENIES SOB, FACIAL, MOUTH OR TONGUE SWELLING       Diet: cardiac diet and diabetic diet: 2000 calorie    Activities: activity as tolerated    Nursing:   SN to complete comprehensive assessment including routine vital signs. Instruct on disease process and s/s of complications to report to MD. Review/verify medication list sent home with the patient at time of discharge  and instruct patient/caregiver as needed.  Frequency may be adjusted depending on start of care date.    Notify MD if SBP > 160 or < 90; DBP > 90 or < 50; HR > 120 or < 50; Temp > 101; Other:   >170SBP      CONSULTS:    Physical Therapy to evaluate and treat. Evaluate for home safety and equipment needs; Establish/upgrade home exercise program. Perform / instruct on therapeutic exercises, gait training, transfer training, and Range of Motion.  Occupational Therapy to evaluate and treat. Evaluate home environment for safety and equipment needs. Perform/Instruct on transfers, ADL training, ROM, and therapeutic exercises.   to evaluate for community resources/long-range planning.  Aide to provide assistance with personal care, ADLs, and vital signs.        Medications: Review discharge medications with patient and family and provide education.      Current Discharge Medication List        CONTINUE these medications which have NOT CHANGED    Details   clopidogrel (PLAVIX) 75 mg tablet Take 75 mg by mouth once daily.      dorzolamide-timolol 2-0.5% (COSOPT) 22.3-6.8 mg/mL ophthalmic solution Place 1 drop into the right eye 2 (two) times daily.      ibuprofen (ADVIL,MOTRIN) 600 MG tablet Take 1 tablet (600 mg total) by mouth every 6 (six) hours as needed.  Qty: 30 tablet, Refills: 0      latanoprost 0.005 % ophthalmic solution Place 1 drop into the right eye every evening.      losartan-hydrochlorothiazide 100-25 mg (HYZAAR) 100-25 mg per tablet Take 1 tablet by mouth once daily.      rosuvastatin (CRESTOR) 20 MG tablet Take 20 mg by mouth once daily.      tamsulosin (FLOMAX) 0.4 mg Cap Take 0.4 mg by mouth once daily.             I certify that this patient is confined to his home and needs intermittent skilled nursing care, physical therapy, speech therapy, and occupational therapy.

## 2023-12-15 NOTE — ED NOTES
Out of walkers in ortho room. Per house sup, no walkers available in hospital at this time. Called DME to have walker delivered. ETA 1 hour.

## 2023-12-15 NOTE — ED NOTES
Walker delivered by DME. Patient walk tested with walker and ambulated without difficulty. Pt discharged and used walker to ambulate out. Pt declined wheelchair.

## 2023-12-15 NOTE — PLAN OF CARE
"   12/15/23 0338   Post-Acute Status   Post-Acute Authorization Home Health   Home Health Status Referrals Sent   Discharge Delays None known at this time   Discharge Plan   Discharge Plan A Home;Home Health   Discharge Plan B Home;Home Health     SW met with patient. Patient sated that he lives at home alone. Patient appears to have poor insight. SW explained to patient some health concerns and that he may needs a higher level of care soon. Patient stated that he has 2 children that lives in [place], [place]. Patient stated that he has not spoken with them "in a while". Patient stated that he does not have any numbers for his children (Guera and Hernan). Patient stated that he does speak with his sister often. Patient stated that his sister is in her seventies and does not know about his health decline.     SW offered to call patient's sister but it was later in the night. Patient agreed to home health and did not have a preference. Patient did state that he would like someone to speak to his sister at an earlier time.     Patient was also given a walker for home use.    VICTORIA Luciano, MSW-SW  Medical Social Worker/  ER Department     "
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
## 2023-12-15 NOTE — DISCHARGE INSTRUCTIONS
Please follow-up with your oncology doctor regarding your metastatic prostate cancer. We have set up a  to help you with obtaining a home health aide and a walker at home. Return to ED if you have worsening symptoms, difficulty urinating, severe back pain, numbness in legs, inability to walk, difficulty breathing, or chest pain.
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
## 2023-12-15 NOTE — ED NOTES
Assumed care of pt at this time. VSS, RR even and unlabored. Resting in bed comfortably. No voiced compaints of pain or discomfort at this time. Safety protocols remain intact.  Patient changed into gown and placed on continuous cardiac monitoring, blood pressure cuff and pulse ox.  White board updated.

## 2023-12-18 NOTE — PLAN OF CARE
Pt has been accepted to [facility] via [facility].    SW attempted to contacted [phone] and there was no answer.  SW sent message via [employer] to admit pt.        Jie Dhaliwal CD, MSW, LMSW, RSW   Case Management  [facility]  Email: [email or web address]

## 2024-08-01 ENCOUNTER — TELEPHONE (OUTPATIENT)
Dept: OPTOMETRY | Facility: CLINIC | Age: 79
End: 2024-08-01
Payer: MEDICARE

## 2024-08-07 ENCOUNTER — TELEPHONE (OUTPATIENT)
Dept: OPTOMETRY | Facility: CLINIC | Age: 79
End: 2024-08-07
Payer: MEDICARE

## (undated) DEVICE — PENCIL ROCKER SWITCH 10FT CORD

## (undated) DEVICE — LOOP VESSEL RED MINI STERILE

## (undated) DEVICE — PACK UNIVERSAL SPLIT II

## (undated) DEVICE — SET DECANTER MEDICHOICE

## (undated) DEVICE — STOPCOCK NDLS INJ MALE LL IV

## (undated) DEVICE — ELECTRODE REM PLYHSV RETURN 9

## (undated) DEVICE — CATH IV INTROCAN 20G X 1.1

## (undated) DEVICE — SOL NORMAL USPCA 0.9%

## (undated) DEVICE — SUT PROLENE 6-0 24 BV-1

## (undated) DEVICE — KIT VALVALOTOME EXPANDABLE

## (undated) DEVICE — SUT 3-0 12-18IN SILK

## (undated) DEVICE — DRESSING TRANS 4X4 TEGADERM

## (undated) DEVICE — GOWN SURG 2XL DISP TIE BACK

## (undated) DEVICE — SET MICROPUNCTURE

## (undated) DEVICE — SUT 4-0 12-18IN SILK BLACK

## (undated) DEVICE — ITEM INACTIVATED - ERP

## (undated) DEVICE — INTRODUCER VASC RADPQ 5FRX10CM

## (undated) DEVICE — GUIDEWIRE STF .035X180CM ANG

## (undated) DEVICE — TUBING CNTRST INJ ADPT 72IN

## (undated) DEVICE — SYR MEDRAD 50/BX

## (undated) DEVICE — SOL NS 1000CC

## (undated) DEVICE — SUT MONOCRYL 2-0 CT-1 VIL

## (undated) DEVICE — DRAPE PLASTIC U 60X72

## (undated) DEVICE — KIT IRR SUCTION HND PIECE

## (undated) DEVICE — DEVICE CLOSURE MYNX GRIP 5FR

## (undated) DEVICE — SEE MEDLINE ITEM 154981

## (undated) DEVICE — SEE MEDLINE ITEM 152622

## (undated) DEVICE — KIT COLLECTION E SWAB REGULAR

## (undated) DEVICE — KIT INTRO MICRO NIT VSI 4FR

## (undated) DEVICE — SYR ONLY LUER LOCK 20CC

## (undated) DEVICE — SEE MEDLINE ITEM 157131

## (undated) DEVICE — SPONGE GAUZE 16PLY 4X4

## (undated) DEVICE — LOOP VESSEL BLUE MAXI

## (undated) DEVICE — SOL 9P NACL IRR PIC IL

## (undated) DEVICE — CONTAINER SPECIMEN STRL 4OZ

## (undated) DEVICE — ADHESIVE DERMABOND ADVANCED

## (undated) DEVICE — TRAY MINOR GEN SURG

## (undated) DEVICE — SUT 2-0 12-18IN SILK

## (undated) DEVICE — DRESSING SPONGE 8PLY 4X4 STRL

## (undated) DEVICE — APPLICATOR CHLORAPREP ORN 26ML

## (undated) DEVICE — SYS LABEL CORRECT MED

## (undated) DEVICE — CATH ANGIO SOFT VU 5FR 65CM

## (undated) DEVICE — COVER INSTR ELASTIC BAND 40X20

## (undated) DEVICE — SUT SILK 2-0 SH 18IN BLACK

## (undated) DEVICE — SUT MONOCRYL 3-0 SH U/D

## (undated) DEVICE — GAUZE SPONGE 4X4 12PLY

## (undated) DEVICE — DRAPE BAG ISOLATION 20 X 20

## (undated) DEVICE — SUT MCRYL PLUS 4-0 PS2 27IN

## (undated) DEVICE — COVERS PROBE NR-48 STERILE

## (undated) DEVICE — STAPLER SKIN PROXIMATE WIDE

## (undated) DEVICE — SEE MEDLINE ITEM 156911

## (undated) DEVICE — Device

## (undated) DEVICE — SPONGE DERMACEA 4X4IN 12PLY

## (undated) DEVICE — SUT 2-0 SILK 30IN BLK BRAID

## (undated) DEVICE — CLIP MED TICALL

## (undated) DEVICE — DRESSING ADH ISLAND 3.6 X 14

## (undated) DEVICE — DRESSING ABSRBNT ISLAND 3.6X8